# Patient Record
Sex: MALE | Employment: PART TIME | URBAN - METROPOLITAN AREA
[De-identification: names, ages, dates, MRNs, and addresses within clinical notes are randomized per-mention and may not be internally consistent; named-entity substitution may affect disease eponyms.]

---

## 2017-04-26 PROBLEM — E11.9 TYPE 2 DIABETES MELLITUS WITHOUT COMPLICATION, WITHOUT LONG-TERM CURRENT USE OF INSULIN (HCC): Status: ACTIVE | Noted: 2017-04-26

## 2017-12-08 ENCOUNTER — ANESTHESIA EVENT (OUTPATIENT)
Dept: ENDOSCOPY | Age: 52
End: 2017-12-08
Payer: COMMERCIAL

## 2017-12-08 RX ORDER — SODIUM CHLORIDE, SODIUM LACTATE, POTASSIUM CHLORIDE, CALCIUM CHLORIDE 600; 310; 30; 20 MG/100ML; MG/100ML; MG/100ML; MG/100ML
100 INJECTION, SOLUTION INTRAVENOUS CONTINUOUS
Status: CANCELLED | OUTPATIENT
Start: 2017-12-08

## 2017-12-11 ENCOUNTER — ANESTHESIA (OUTPATIENT)
Dept: ENDOSCOPY | Age: 52
End: 2017-12-11
Payer: COMMERCIAL

## 2017-12-11 ENCOUNTER — HOSPITAL ENCOUNTER (OUTPATIENT)
Age: 52
Setting detail: OUTPATIENT SURGERY
Discharge: HOME | End: 2017-12-11
Attending: SURGERY | Admitting: SURGERY
Payer: COMMERCIAL

## 2017-12-11 VITALS
OXYGEN SATURATION: 93 % | HEART RATE: 63 BPM | HEIGHT: 70 IN | DIASTOLIC BLOOD PRESSURE: 68 MMHG | RESPIRATION RATE: 16 BRPM | SYSTOLIC BLOOD PRESSURE: 141 MMHG | TEMPERATURE: 96.8 F | BODY MASS INDEX: 31.5 KG/M2 | WEIGHT: 220 LBS

## 2017-12-11 LAB — GLUCOSE BLD STRIP.AUTO-MCNC: 93 MG/DL (ref 65–100)

## 2017-12-11 PROCEDURE — 74011250636 HC RX REV CODE- 250/636

## 2017-12-11 PROCEDURE — 77030009426 HC FCPS BIOP ENDOSC BSC -B: Performed by: SURGERY

## 2017-12-11 PROCEDURE — 74011000250 HC RX REV CODE- 250

## 2017-12-11 PROCEDURE — 82962 GLUCOSE BLOOD TEST: CPT

## 2017-12-11 PROCEDURE — 77030011640 HC PAD GRND REM COVD -A: Performed by: SURGERY

## 2017-12-11 PROCEDURE — 88305 TISSUE EXAM BY PATHOLOGIST: CPT | Performed by: SURGERY

## 2017-12-11 PROCEDURE — 76060000032 HC ANESTHESIA 0.5 TO 1 HR: Performed by: SURGERY

## 2017-12-11 PROCEDURE — 76040000025: Performed by: SURGERY

## 2017-12-11 RX ORDER — LIDOCAINE HYDROCHLORIDE 20 MG/ML
INJECTION, SOLUTION EPIDURAL; INFILTRATION; INTRACAUDAL; PERINEURAL AS NEEDED
Status: DISCONTINUED | OUTPATIENT
Start: 2017-12-11 | End: 2017-12-11 | Stop reason: HOSPADM

## 2017-12-11 RX ORDER — PROPOFOL 10 MG/ML
INJECTION, EMULSION INTRAVENOUS
Status: DISCONTINUED | OUTPATIENT
Start: 2017-12-11 | End: 2017-12-11 | Stop reason: HOSPADM

## 2017-12-11 RX ORDER — PROPOFOL 10 MG/ML
INJECTION, EMULSION INTRAVENOUS AS NEEDED
Status: DISCONTINUED | OUTPATIENT
Start: 2017-12-11 | End: 2017-12-11 | Stop reason: HOSPADM

## 2017-12-11 RX ORDER — SODIUM CHLORIDE, SODIUM LACTATE, POTASSIUM CHLORIDE, CALCIUM CHLORIDE 600; 310; 30; 20 MG/100ML; MG/100ML; MG/100ML; MG/100ML
INJECTION, SOLUTION INTRAVENOUS
Status: DISCONTINUED | OUTPATIENT
Start: 2017-12-11 | End: 2017-12-11 | Stop reason: HOSPADM

## 2017-12-11 RX ADMIN — PROPOFOL 160 MCG/KG/MIN: 10 INJECTION, EMULSION INTRAVENOUS at 11:51

## 2017-12-11 RX ADMIN — SODIUM CHLORIDE, SODIUM LACTATE, POTASSIUM CHLORIDE, CALCIUM CHLORIDE: 600; 310; 30; 20 INJECTION, SOLUTION INTRAVENOUS at 11:31

## 2017-12-11 RX ADMIN — LIDOCAINE HYDROCHLORIDE 30 MG: 20 INJECTION, SOLUTION EPIDURAL; INFILTRATION; INTRACAUDAL; PERINEURAL at 11:50

## 2017-12-11 RX ADMIN — PROPOFOL 50 MG: 10 INJECTION, EMULSION INTRAVENOUS at 11:50

## 2017-12-11 RX ADMIN — PROPOFOL 30 MG: 10 INJECTION, EMULSION INTRAVENOUS at 11:52

## 2017-12-11 NOTE — DISCHARGE INSTRUCTIONS
Gastrointestinal Colonoscopy/Flexible Sigmoidoscopy - Lower Exam Discharge Instructions  1. Call Dr. Shannan Brooke at [phone] for any problems or questions. 2. Contact the doctors office for follow up appointment as directed  3. Medication may cause drowsiness for several hours, therefore, do not drive or operate machinery for remainder of the day. 4. No alcohol today. 5. Ordinarily, you may resume regular diet and activity after exam unless otherwise specified by your physician. 6. Because of air put into your colon during exam, you may experience some abdominal distension, relieved by the passage of gas, for several hours. 7. Contact your physician if you have any of the following:  a. Excessive amount of bleeding - large amount when having a bowel movement. Occasional specks of blood with bowel movement would not be unusual.  b. Severe abdominal pain  c. Fever or Chills  8. Polyp Removal - follow these additional instructions  a. Clear liquid diet for the next meal (jell-o, broth, clear drinks)  b. Soft diet for 24 hours, then resume regular diet   c. Take Metamucil - 1 tablespoon in juice every morning for 3 days  d. No Aspirin, Advil, Aleve, Nuprin, Ibuprofen, or medications that contain these drugs for 2 weeks. Any additional instructions: Await pathology results. Instructions given to Brysonila Elliott and other family members.

## 2017-12-11 NOTE — ANESTHESIA POSTPROCEDURE EVALUATION
Post-Anesthesia Evaluation and Assessment    Patient: Mary Jane Swann MRN: [id number]  SSN: [id number]    YOB: 1965  Age: 46 y.o. Sex: male       Cardiovascular Function/Vital Signs  Visit Vitals    /77    Pulse 80    Temp 36 °C (96.8 °F)    Resp 12    Ht 5' 10\" (1.778 m)    Wt 99.8 kg (220 lb)    SpO2 97%    BMI 31.57 kg/m2       Patient is status post total IV anesthesia anesthesia for Procedure(s):  COLONOSCOPY / BMI=31  ENDOSCOPIC POLYPECTOMY. Nausea/Vomiting: None    Postoperative hydration reviewed and adequate. Pain:  Pain Scale 1: Visual (12/11/17 1220)  Pain Intensity 1: 0 (12/11/17 1220)   Managed    Neurological Status: At baseline    Mental Status and Level of Consciousness: Arousable    Pulmonary Status:   O2 Device: Nasal cannula (12/11/17 1220)   Adequate oxygenation and airway patent    Complications related to anesthesia: None    Post-anesthesia assessment completed.  No concerns    Signed By: Chele Leslie MD     December 11, 2017

## 2017-12-11 NOTE — ANESTHESIA PREPROCEDURE EVALUATION
Anesthetic History   No history of anesthetic complications            Review of Systems / Medical History  Patient summary reviewed and pertinent labs reviewed    Pulmonary        Sleep apnea: CPAP           Neuro/Psych   Within defined limits           Cardiovascular    Hypertension: well controlled              Exercise tolerance: >4 METS     GI/Hepatic/Renal     GERD: well controlled           Endo/Other    Diabetes: well controlled, type 2    Arthritis     Other Findings              Physical Exam    Airway  Mallampati: II  TM Distance: 4 - 6 cm  Neck ROM: normal range of motion   Mouth opening: Normal     Cardiovascular  Regular rate and rhythm,  S1 and S2 normal,  no murmur, click, rub, or gallop             Dental         Pulmonary  Breath sounds clear to auscultation               Abdominal  GI exam deferred       Other Findings            Anesthetic Plan    ASA: 3  Anesthesia type: total IV anesthesia          Induction: Intravenous  Anesthetic plan and risks discussed with: Patient

## 2017-12-11 NOTE — H&P
Colonoscopy History and Physical      Patient: Ida Sully    Physician: Belle aMgana MD    Referring Physician: Gabriella Mendiola MD    Chief Complaint: For colonoscopy    History of Present Illness: Pt presents for colonoscopy. Initial age-based screening. History:  Past Medical History:   Diagnosis Date    Diabetes ([facility] [address]     type 2- does not check glucose- last A1C \"normal\"- in Nov 2017    Generalized osteoarthrosis, unspecified site 9/7/2013    knees    GERD (gastroesophageal reflux disease)     does take daily-does take weekly    Hepatomegaly 9/7/2013    History of athlete's foot     History of cerebral venous sinus thrombosis associated with congenital heart disease     History of kidney stones     Hodgkin lymphoma ([facility] [address]     dx 2001- no episode since    Hyperlipidemia     Hypertension     OA (osteoarthritis)     Obstructive sleep apnea (adult) (pediatric) 9/7/2013    uses CPAP    Prediabetes      Past Surgical History:   Procedure Laterality Date    HX HEENT      SINUS SURGERY    HX LITHOTRIPSY      HX LYMPH NODE DISSECTION        Social History     Social History    Marital status:      Spouse name: N/A    Number of children: N/A    Years of education: N/A     Social History Main Topics    Smoking status: Never Smoker    Smokeless tobacco: Never Used    Alcohol use No    Drug use: No    Sexual activity: Yes     Other Topics Concern    Not on file     Social History Narrative      Family History   Problem Relation Age of Onset    Heart Attack Mother 61     MI    Elevated Lipids Mother     Hypertension Mother     No Known Problems Father     Cancer Sister     MS Sister     No Known Problems Sister        Medications:   Prior to Admission medications    Medication Sig Start Date End Date Taking? Authorizing Provider   cholecalciferol, vitamin D3, (VITAMIN D3) 2,000 unit tab Take  by mouth daily.     Historical Provider   traMADol (ULTRAM) 50 mg tablet Take 1 Tab by mouth every six (6) hours as needed for Pain. Max Daily Amount: 200 mg. Indications: Pain  Patient taking differently: Take 50 mg by mouth every six (6) hours as needed for Pain. Pt takes 2 tabs at once usually  Indications: Pain 10/25/17   Nancy Mendoza MD   atorvastatin (LIPITOR) 80 mg tablet Take 1 Tab by mouth daily. Patient taking differently: Take 80 mg by mouth nightly. 10/25/17   Nancy Mendoza MD   atenolol (TENORMIN) 25 mg tablet Take 1 Tab by mouth daily for 90 days. Patient taking differently: Take 25 mg by mouth nightly. 10/25/17 1/23/18  Nancy Mendoza MD   celecoxib (CELEBREX) 200 mg capsule Take 1 Cap by mouth two (2) times a day for 90 days. Patient taking differently: Take 200 mg by mouth two (2) times a day. Last dose 12/8/17 10/25/17 1/23/18  Nancy Mendoza MD   fenofibrate micronized (LOFIBRA) 134 mg capsule Take 1 Cap by mouth every morning for 90 days. Patient taking differently: Take 134 mg by mouth nightly. 10/25/17 1/23/18  Nancy Mendoza MD   omeprazole (PRILOSEC) 40 mg capsule Take 1 Cap by mouth daily for 90 days. Patient taking differently: Take 40 mg by mouth daily as needed. 10/25/17 1/23/18  Nancy Mendoza MD   fluticasone (FLONASE) 50 mcg/actuation nasal spray 2 Sprays by Both Nostrils route daily. Patient taking differently: 2 Sprays by Both Nostrils route daily as needed. 10/25/17   Nancy Mendoza MD   dapagliflozin-metformin (XIGDUO XR) 5-1,000 mg TBph Take 1 Tab by mouth daily. Patient taking differently: Take 1 Tab by mouth nightly. 10/25/17   aNncy Mendoza MD   cpap machine kit 15 cm every night    Historical Provider   aspirin delayed-release 81 mg tablet Take 1 Tab by mouth daily. Patient taking differently: Take 81 mg by mouth daily. Last dose 12/8/17 11/22/13   Chico Smith MD       Allergies:    Allergies   Allergen Reactions    Bactrim [Sulfamethoprim Ds] Rash    Pcn [Penicillins] Rash       Physical Exam:     Vital Signs:   Visit Vitals    Temp 98 °F (36.7 °C)    Ht 5' 10\" (1.778 m)    Wt 220 lb (99.8 kg)    BMI 31.57 kg/m2     . General: in NAD      Heart: regular   Lungs: unlabored   Abdominal: soft   Neurological: grossly normal        Findings/Diagnosis: Screening for colorectal cancer     Plan of Care/Planned Procedure: Colonoscopy. Risks of endoscopy include  bleeding, perforation. They understand and agree to proceed.       Signed:  Tomy Merritt MD   12/11/2017

## 2017-12-11 NOTE — PROCEDURES
Procedure in Detail:  Informed consent was obtained for the procedure. The patient was placed in the left lateral decubitus position and sedation was induced by anesthesia. The [id number] was inserted into the rectum and advanced under direct vision to the cecum, which was identified by the ileocecal valve and appendiceal orifice. The quality of the colonic preparation was adequate. A careful inspection was made as the colonoscope was withdrawn, including a retroflexed view of the rectum; findings and interventions are described below. Appropriate photodocumentation was obtained. Findings:   Rectum:   Normal  Sigmoid:   Normal  Descending Colon:     - Protruding lesions:     -Sessile Polyp(s) size 5 mm removed by polypectomy (hot biopsy)  Transverse Colon:   Normal  Ascending Colon:   Normal  Cecum:   Normal            Specimens: Specimens were collected and sent to pathology. Complications: None; patient tolerated the procedure well. \    EBL - none    Recommendations:   - Await pathology. - If adenoma is present, repeat colonoscopy in 5 years.      Signed By: Gulshan Tompkins MD                        December 11, 2017

## 2017-12-12 ENCOUNTER — PATIENT OUTREACH (OUTPATIENT)
Dept: OTHER | Age: 52
End: 2017-12-12

## 2017-12-12 NOTE — PROGRESS NOTES
Transition Of Care Note    Patient discharged from Outpatient hospital post-colonoscopy. Medical History:     Past Medical History:   Diagnosis Date    Diabetes ([facility] [address]     type 2- does not check glucose- last A1C \"normal\"- in 2017    Generalized osteoarthrosis, unspecified site 2013    knees    GERD (gastroesophageal reflux disease)     does take daily-does take weekly    Hepatomegaly 2013    History of athlete's foot     History of cerebral venous sinus thrombosis associated with congenital heart disease     History of kidney stones     Hodgkin lymphoma ([facility] [address]     dx - no episode since    Hyperlipidemia     Hypertension     OA (osteoarthritis)     Obstructive sleep apnea (adult) (pediatric) 2013    uses CPAP    Prediabetes        Care Manager contacted the patient by telephone to perform post hospital discharge assessment. Verified  and zip code with patient as identifiers. Provided introduction to self, and explanation of the Nurse Care Manager role. Patient states he is having some new pain (5/10) in his lower back and has placed a call into his PCP to let him know. States it feels similar to previous kidney stone symptoms in the past.  Patient states he is back to eating his usual diet without nausea or diarrhea. Denies fever or rectal discharge. Denies blood in his urine. Declines any education on Type 2 diabetes, diet or heart disease at this time. Medication:   Performed medication reconciliation with patient, and patient verbalizes understanding of administration of home medications. There were no barriers to obtaining medications identified at this time. Support system:  patient    Discharge Instructions :  Reviewed discharge instructions with patient. Patient verbalizes understanding of discharge instructions and follow-up care. Red Flags:  Call your physician if  · You have pain that does not get better after you take pain medicine.    · You are sick to your stomach or cannot drink fluids. · You have new or worse belly pain. · You have blood in your stools. · You have a fever. · You cannot pass stools or gas    Advance Care Planning:   Patient was offered the opportunity to discuss advance care planning:  yes     Does patient have an Advance Directive:  yes   If no, did you provide information on Caring Connections?  no     PCP/Specialist follow up: Patient scheduled to follow up with Penny Darby MD on 12/15/17. Reviewed red flags with patient, and patient verbalizes understanding. Patient given an opportunity to ask questions. No other clinical/social/functional needs noted. The patient agrees to contact the PCP office for questions related to their healthcare. The patient expressed thanks, offered no additional questions and ended the call.

## 2017-12-20 ENCOUNTER — PATIENT OUTREACH (OUTPATIENT)
Dept: OTHER | Age: 52
End: 2017-12-20

## 2017-12-20 NOTE — PROGRESS NOTES
Care Manager contacted the patient by telephone for CM follow up. Verified  and zip code with patient as identifiers. Patient states no new problems since last call. States he is doing well. States he is no longer having the lower back pain, that he does tend to have stones now and then and feel like he may have passed a stone in the last week as his back is no longer hurting. Denies any other concerns or questions. Patient has my contact information and understands I am available anytime for any questions or concerns. Will continue to follow for any additional CM needs.

## 2018-01-14 ENCOUNTER — PATIENT OUTREACH (OUTPATIENT)
Dept: OTHER | Age: 53
End: 2018-01-14

## 2018-01-15 NOTE — PROGRESS NOTES
Resolving current episode. Transitions of care complete. Goals met and no complications post study. No further ED/UC or hospital admissions within 30 days post discharge. Patient attended follow-up appointments as directed. No outreach from patient to [address] [place] [address]. Patient declined any additional CM needs.

## 2018-08-16 ENCOUNTER — HOSPITAL ENCOUNTER (OUTPATIENT)
Dept: CT IMAGING | Age: 53
Discharge: HOME | End: 2018-08-16
Attending: FAMILY MEDICINE
Payer: COMMERCIAL

## 2018-08-16 DIAGNOSIS — R31.9 HEMATURIA, UNSPECIFIED TYPE: ICD-10-CM

## 2018-08-16 PROCEDURE — 74176 CT ABD & PELVIS W/O CONTRAST: CPT

## 2019-02-05 PROBLEM — E66.01 SEVERE OBESITY (HCC): Status: ACTIVE | Noted: 2019-02-05

## 2019-09-04 RX ORDER — SODIUM CHLORIDE 0.9 % (FLUSH) 0.9 %
5-40 SYRINGE (ML) INJECTION EVERY 8 HOURS
Status: CANCELLED | OUTPATIENT
Start: 2019-09-04

## 2019-09-04 RX ORDER — SODIUM CHLORIDE 0.9 % (FLUSH) 0.9 %
5-40 SYRINGE (ML) INJECTION AS NEEDED
Status: CANCELLED | OUTPATIENT
Start: 2019-09-04

## 2019-09-09 ENCOUNTER — ANESTHESIA EVENT (OUTPATIENT)
Dept: SURGERY | Age: 54
End: 2019-09-09
Payer: OTHER MISCELLANEOUS

## 2019-09-09 NOTE — H&P
Date of Service: 2019-08-30  Work Status:  ????? Allergies:Bactrim(rash); Penicillin(rash)  Medications:Atenolol (25 MG); Atorvastatin Calcium (80 MG);Celecoxib (200 MG); Fenofibrate Micronized (134 MG); Omeprazole (40 MG); TraMADol HCl (50 MG)    CC: Injury of the right elbow    HPI:  The patient is a 42-year-old right hand dominant white male who works for [employer] in the [email or web address] center. He injured his right elbow on the lateral aspect on 07/05/2019. He was throwing a piece of metal into a bin and it caught on another piece of metal and twisted his forearm as he was forcibly pushing within the metal.  He had immediate pain in the outer part of the elbow and has continued to bother him with grasping, gripping, lifting and other activities. It aches and hurts at night. He had a previous rupture of his right distal biceps 2 or 3 years ago that was not repaired. It has continued to bother him some with the feeling of weakness in the arm and he says now that he wishes that he had it repaired then. He has been using a wrist splint and banded type tennis elbow strap, as well as Neoprene elbow sleeve. These give him some partial relief. He has had an MRI done. PMH, SH & ROS:  This form has been filled out, reviewed, signed and placed in the patient's chart. I reviewed pertinent positive and negative responses and tried to associate them with the musculoskeletal problem of the patient. Other positive responses were deferred to the patient's primary care physician. The patient has a negative review of systems now. He does not smoke or drink. He has history of high blood pressure, high cholesterol, diabetes, sleep apnea with use of a CPAP, and he had Hodgkin's lymphoma diagnosed in 2001. MEDICATIONS:  Current medicines are listed. ALLERGIES:  Penicillin and Bactrim both of which cause a rash.        PE:  On physical exam, the patient is an alert and oriented generally healthy appearing middle aged white male. He is 5'11'' tall and weighs 215 pounds. His neck shows good general range of motion without masses or asymmetry. The right upper extremity has good movement of the shoulder. The elbow region shows the patient to have a positive Oren sign with upward migration of his biceps. He has no palpable biceps across the antecubital fossa. He has some puffiness and swelling over the lateral epicondylar region and is very tender there at the lateral epicondyle to palpation. His medial epicondyle and olecranon region are not tender. He has good strength in the triceps. He has full range of motion with range from 0 to 150 degrees. He has good pronation and supination of his elbow. He has pain with resisted wrist extension, pain being along the lateral epicondylar area. His wrist and fingers move well. Circulation is good with palpable radial pulse at the wrist with no peripheral edema present in the forearm or hand. Neurologic exam is intact to light touch and motor function in the radial, ulnar and median nerve distribution. Skin and nails are normal.      IMAGING:  The patient has had an MRI done of the right elbow at [facility] on 08/05/2019. I have reviewed both the actual images, as well as the interpretation by the radiologist.  This shows the patient to have approximately 80% high grade tear of the conjoin tendon at the lateral epicondyle. The lateral ligaments look to be intact. There was also noted to be a chronic retracted tear of the distal biceps tendon. There was also some subchondral changes noted in the capitellar region. RADIOGRAPHS:   The patient had regular x-rays of his right elbow done at [facility] on 07/05/2019. These were normal and showed no fractures or acute injuries that were visible on the x-rays.       DIAGNOSIS:   High grade partial tear of the conjoin tendon and wrist extensors of the right elbow.    DISPOSITION:  The problem was discussed with the patient. Typically with traumatic injuries such as this the pain persists and patients ultimately come to surgical repair. I recommended going ahead with that. It would be an outpatient procedure, but would require several weeks before he could return to more or less regular duty. He could probably return to light duty with minimal use of the extremity after 10-14 days. He would like to go ahead with scheduling the procedure. We will seek the authorization from Workers' Compensation carriers and final scheduling for this to be done as an outpatient. He will require a couple of bone anchors for the repair of the tendon back to the bone.

## 2019-09-10 ENCOUNTER — HOSPITAL ENCOUNTER (OUTPATIENT)
Age: 54
Setting detail: OUTPATIENT SURGERY
Discharge: HOME | End: 2019-09-10
Attending: ORTHOPAEDIC SURGERY | Admitting: ORTHOPAEDIC SURGERY
Payer: OTHER MISCELLANEOUS

## 2019-09-10 ENCOUNTER — ANESTHESIA (OUTPATIENT)
Dept: SURGERY | Age: 54
End: 2019-09-10
Payer: OTHER MISCELLANEOUS

## 2019-09-10 VITALS
OXYGEN SATURATION: 96 % | HEART RATE: 51 BPM | RESPIRATION RATE: 16 BRPM | TEMPERATURE: 97.5 F | WEIGHT: 215 LBS | SYSTOLIC BLOOD PRESSURE: 122 MMHG | DIASTOLIC BLOOD PRESSURE: 72 MMHG | BODY MASS INDEX: 29.99 KG/M2

## 2019-09-10 DIAGNOSIS — G89.18 POST-OP PAIN: Primary | ICD-10-CM

## 2019-09-10 LAB — GLUCOSE BLD STRIP.AUTO-MCNC: 97 MG/DL (ref 65–100)

## 2019-09-10 PROCEDURE — 77030016570 HC BLNKT BAIR HGGR 3M -B: Performed by: NURSE ANESTHETIST, CERTIFIED REGISTERED

## 2019-09-10 PROCEDURE — 76210000021 HC REC RM PH II 0.5 TO 1 HR: Performed by: ORTHOPAEDIC SURGERY

## 2019-09-10 PROCEDURE — 77030020275 HC MISC ORTHOPEDIC: Performed by: ORTHOPAEDIC SURGERY

## 2019-09-10 PROCEDURE — 76010010054 HC POST OP PAIN BLOCK: Performed by: ORTHOPAEDIC SURGERY

## 2019-09-10 PROCEDURE — 77030039266 HC ADH SKN EXOFIN S2SG -A: Performed by: ORTHOPAEDIC SURGERY

## 2019-09-10 PROCEDURE — C1713 ANCHOR/SCREW BN/BN,TIS/BN: HCPCS | Performed by: ORTHOPAEDIC SURGERY

## 2019-09-10 PROCEDURE — 74011250636 HC RX REV CODE- 250/636

## 2019-09-10 PROCEDURE — 76942 ECHO GUIDE FOR BIOPSY: CPT | Performed by: ORTHOPAEDIC SURGERY

## 2019-09-10 PROCEDURE — 76210000063 HC OR PH I REC FIRST 0.5 HR: Performed by: ORTHOPAEDIC SURGERY

## 2019-09-10 PROCEDURE — 76060000032 HC ANESTHESIA 0.5 TO 1 HR: Performed by: ORTHOPAEDIC SURGERY

## 2019-09-10 PROCEDURE — 82962 GLUCOSE BLOOD TEST: CPT

## 2019-09-10 PROCEDURE — 76010000160 HC OR TIME 0.5 TO 1 HR INTENSV-TIER 1: Performed by: ORTHOPAEDIC SURGERY

## 2019-09-10 PROCEDURE — 77030002966 HC SUT PDS J&J -A: Performed by: ORTHOPAEDIC SURGERY

## 2019-09-10 PROCEDURE — A4565 SLINGS: HCPCS | Performed by: ORTHOPAEDIC SURGERY

## 2019-09-10 PROCEDURE — 77030003028 HC SUT VCRL J&J -A: Performed by: ORTHOPAEDIC SURGERY

## 2019-09-10 PROCEDURE — 77030003602 HC NDL NRV BLK BBMI -B: Performed by: NURSE ANESTHETIST, CERTIFIED REGISTERED

## 2019-09-10 PROCEDURE — 77030018836 HC SOL IRR NACL ICUM -A: Performed by: ORTHOPAEDIC SURGERY

## 2019-09-10 PROCEDURE — 74011250636 HC RX REV CODE- 250/636: Performed by: ORTHOPAEDIC SURGERY

## 2019-09-10 PROCEDURE — 77030000032 HC CUF TRNQT ZIMM -B: Performed by: ORTHOPAEDIC SURGERY

## 2019-09-10 PROCEDURE — 74011250636 HC RX REV CODE- 250/636: Performed by: ANESTHESIOLOGY

## 2019-09-10 PROCEDURE — 77030029883 HC RETRV SUT ARTHSCP HOFFE BEAT -B: Performed by: ORTHOPAEDIC SURGERY

## 2019-09-10 DEVICE — DYNOMITE 2.0 MM PEEK WITH 1                                    ULTRABRAID SUTURE SIZES 2-0 AND NEEDLES
Type: IMPLANTABLE DEVICE | Site: ARM | Status: FUNCTIONAL
Brand: DYNOMITE

## 2019-09-10 RX ORDER — SODIUM CHLORIDE 0.9 % (FLUSH) 0.9 %
5-40 SYRINGE (ML) INJECTION EVERY 8 HOURS
Status: DISCONTINUED | OUTPATIENT
Start: 2019-09-10 | End: 2019-09-10 | Stop reason: HOSPADM

## 2019-09-10 RX ORDER — OXYCODONE HYDROCHLORIDE 5 MG/1
5 TABLET ORAL
Status: DISCONTINUED | OUTPATIENT
Start: 2019-09-10 | End: 2019-09-10 | Stop reason: HOSPADM

## 2019-09-10 RX ORDER — SODIUM CHLORIDE, SODIUM LACTATE, POTASSIUM CHLORIDE, CALCIUM CHLORIDE 600; 310; 30; 20 MG/100ML; MG/100ML; MG/100ML; MG/100ML
100 INJECTION, SOLUTION INTRAVENOUS CONTINUOUS
Status: DISCONTINUED | OUTPATIENT
Start: 2019-09-10 | End: 2019-09-10 | Stop reason: HOSPADM

## 2019-09-10 RX ORDER — SODIUM CHLORIDE 0.9 % (FLUSH) 0.9 %
5-40 SYRINGE (ML) INJECTION AS NEEDED
Status: DISCONTINUED | OUTPATIENT
Start: 2019-09-10 | End: 2019-09-10 | Stop reason: HOSPADM

## 2019-09-10 RX ORDER — CEFAZOLIN SODIUM/WATER 2 G/20 ML
2 SYRINGE (ML) INTRAVENOUS ONCE
Status: COMPLETED | OUTPATIENT
Start: 2019-09-10 | End: 2019-09-10

## 2019-09-10 RX ORDER — ONDANSETRON 2 MG/ML
INJECTION INTRAMUSCULAR; INTRAVENOUS AS NEEDED
Status: DISCONTINUED | OUTPATIENT
Start: 2019-09-10 | End: 2019-09-10 | Stop reason: HOSPADM

## 2019-09-10 RX ORDER — LIDOCAINE HYDROCHLORIDE 10 MG/ML
0.1 INJECTION INFILTRATION; PERINEURAL AS NEEDED
Status: DISCONTINUED | OUTPATIENT
Start: 2019-09-10 | End: 2019-09-10 | Stop reason: HOSPADM

## 2019-09-10 RX ORDER — FENTANYL CITRATE 50 UG/ML
100 INJECTION, SOLUTION INTRAMUSCULAR; INTRAVENOUS ONCE
Status: COMPLETED | OUTPATIENT
Start: 2019-09-10 | End: 2019-09-10

## 2019-09-10 RX ORDER — PROPOFOL 10 MG/ML
INJECTION, EMULSION INTRAVENOUS
Status: DISCONTINUED | OUTPATIENT
Start: 2019-09-10 | End: 2019-09-10 | Stop reason: HOSPADM

## 2019-09-10 RX ORDER — NALOXONE HYDROCHLORIDE 0.4 MG/ML
0.04 INJECTION, SOLUTION INTRAMUSCULAR; INTRAVENOUS; SUBCUTANEOUS
Status: DISCONTINUED | OUTPATIENT
Start: 2019-09-10 | End: 2019-09-10 | Stop reason: HOSPADM

## 2019-09-10 RX ORDER — HYDROMORPHONE HYDROCHLORIDE 2 MG/ML
0.5 INJECTION, SOLUTION INTRAMUSCULAR; INTRAVENOUS; SUBCUTANEOUS
Status: DISCONTINUED | OUTPATIENT
Start: 2019-09-10 | End: 2019-09-10 | Stop reason: HOSPADM

## 2019-09-10 RX ORDER — PROPOFOL 10 MG/ML
INJECTION, EMULSION INTRAVENOUS AS NEEDED
Status: DISCONTINUED | OUTPATIENT
Start: 2019-09-10 | End: 2019-09-10 | Stop reason: HOSPADM

## 2019-09-10 RX ORDER — MIDAZOLAM HYDROCHLORIDE 1 MG/ML
2 INJECTION, SOLUTION INTRAMUSCULAR; INTRAVENOUS
Status: COMPLETED | OUTPATIENT
Start: 2019-09-10 | End: 2019-09-10

## 2019-09-10 RX ORDER — MIDAZOLAM HYDROCHLORIDE 1 MG/ML
2 INJECTION, SOLUTION INTRAMUSCULAR; INTRAVENOUS ONCE
Status: DISCONTINUED | OUTPATIENT
Start: 2019-09-10 | End: 2019-09-10 | Stop reason: HOSPADM

## 2019-09-10 RX ORDER — OXYCODONE HYDROCHLORIDE 5 MG/1
5 TABLET ORAL
Qty: 30 TAB | Refills: 0 | Status: SHIPPED | OUTPATIENT
Start: 2019-09-10 | End: 2019-09-15

## 2019-09-10 RX ADMIN — Medication 2 G: at 08:07

## 2019-09-10 RX ADMIN — MIDAZOLAM HYDROCHLORIDE 2 MG: 1 INJECTION, SOLUTION INTRAMUSCULAR; INTRAVENOUS at 06:50

## 2019-09-10 RX ADMIN — FENTANYL CITRATE 100 MCG: 50 INJECTION INTRAMUSCULAR; INTRAVENOUS at 06:50

## 2019-09-10 RX ADMIN — PROPOFOL 200 MCG/KG/MIN: 10 INJECTION, EMULSION INTRAVENOUS at 08:07

## 2019-09-10 RX ADMIN — PROPOFOL 30 MG: 10 INJECTION, EMULSION INTRAVENOUS at 08:16

## 2019-09-10 RX ADMIN — SODIUM CHLORIDE, SODIUM LACTATE, POTASSIUM CHLORIDE, AND CALCIUM CHLORIDE 100 ML/HR: 600; 310; 30; 20 INJECTION, SOLUTION INTRAVENOUS at 06:53

## 2019-09-10 RX ADMIN — ONDANSETRON 4 MG: 2 INJECTION INTRAMUSCULAR; INTRAVENOUS at 08:07

## 2019-09-10 RX ADMIN — PROPOFOL 20 MG: 10 INJECTION, EMULSION INTRAVENOUS at 08:09

## 2019-09-10 NOTE — PERIOP NOTES
(This note made at [phone] on 9-10-19)    This RN answered phone. Pt's wife calling. Concerned that pt has no feeling on operative arm and states operative arm feels \"warm\". Reassured her these are normal findings after receiving a peripheral nerve block. Pt came on speaker phone and states he can't feel anything from his shoulder down. Reassured, again, that this is the goal of a nerve block. Questioned if pt's fingers are his normal skin color. Pt's wife they Junnie No a little darker\". Recommended she loosen pt's ace wrap bandage a little. Instructed if the color continues to darken and swelling does not improve, to call Dr. Aubree Le office. Pt and his wife voice understanding.

## 2019-09-10 NOTE — OP NOTES
Blanca Pino        9/10/2019    Indications: This is a 47 yrs male with a high grade partial tear of his right conjoined tendon. The patient is admitted for surgery as conservative measures have failed. Pre-operative Diagnosis: TEAR OF THE CONJOINED EXTENSOR TENDON OF THE RIGHT ELBOW    Post-operative Diagnosis:  TEAR OF THE CONJOINED EXTENSOR TENDON OF THE RIGHT ELBOW    Procedure: Release with Debridement of Tendon and Bone and Repair of the Conjoined Extensor  Tendon of the Right Elbow    Surgeon: Christy Kearney MD    Anesthesia: Brachial Plexus Block    Procedure Details: Following identification of the patient, the patient was taken to the operating suite and positioned on the operating table in the supine fashion. A time out was performed by the surgical team identifying the patient, surgeon, procedure and site. The patient had been given prophylactic IV Ancef preoperatively. The  right arm  was then prepped and draped in the usual sterile fashion. A 5-6 cm longitudinal was made over the lateral epicondyle and carried down through the subcutaneous layer. Small bleeders were electro-coagulated. The conjoined tendon was split longitudinally and the superficial layers retracted exposing the ECRB origin. There was an area of degenerative tendon and high grade tear or avulsion from the bone. The tendon was split longitudinally and the degenerative area was excised sharply down to the bone. The cortex of the lateral epicondyle was prominent and had a sharp irregular cortex. The prominent portion of the bone was removed with a rongeur and smoothed. The tendon was then repaired to the bone with 2 Smith Nephew DynoMite PEEK anchors inserted into drill holes in the lateral epicondyle. Each anchor was preloaded with a 2-0 UltraBraid suture.   These sutures were passed through the margin of the tendon from the underneath side and then back down through the tendon so that the knots were buried beneath the tendon. The more distal split was repaired  with inverted 2-0 PDS stitches. The subcutaneous layer was closed with 4-0 StratoFix suture and then ExoFin surgical adhesive was  applied to the incision. A sterile dressing of gauze and an Ace wrap was used for compression. The tourniquet was released. The patient was then transferred to the Recovery Room in stable condition.       Findings:   Degenerative area and tear of the ECRB tendon and a very pominent sharp ridge of underlying cortical bone    Tourniquet Time:   Total Tourniquet Time Documented:  Upper Arm (Right) - 26 minutes  Total: Upper Arm (Right) - 26 minutes      Signed By: Juanita Yeboah MD

## 2019-09-10 NOTE — INTERVAL H&P NOTE
H&P Update:  Flako Vela was seen and examined. Pt is alert and oriented. Chest: Clear w/o SOB. C/V:  RR&R    History and physical has been reviewed. The patient has been examined. There have been no significant clinical changes since the completion of the originally dated History and Physical.  Patient identified by surgeon; surgical site was confirmed by patient and surgeon. The patient is for repair of the right conjoined tendon of the elbow.

## 2019-09-10 NOTE — ANESTHESIA POSTPROCEDURE EVALUATION
Procedure(s):  RIGHT ELBOW REPAIR OF EXTENSOR TENDON BRACHIOPLEXUS BLOCK.    total IV anesthesia    Anesthesia Post Evaluation        Patient location during evaluation: PACU  Patient participation: complete - patient participated  Level of consciousness: awake  Pain management: satisfactory to patient  Airway patency: patent  Anesthetic complications: no  Cardiovascular status: hemodynamically stable  Respiratory status: spontaneous ventilation  Hydration status: euvolemic  Post anesthesia nausea and vomiting:  none      Vitals Value Taken Time   /70 9/10/2019  9:09 AM   Temp 36.4 °C (97.5 °F) 9/10/2019  8:51 AM   Pulse 53 9/10/2019  9:14 AM   Resp 16 9/10/2019  9:04 AM   SpO2 95 % 9/10/2019  9:14 AM   Vitals shown include unvalidated device data.

## 2019-09-10 NOTE — ANESTHESIA PROCEDURE NOTES
Peripheral Block    Start time: 9/10/2019 6:50 AM  End time: 9/10/2019 6:56 AM  Performed by: Srini Power MD  Authorized by: Srini Power MD       Pre-procedure: Indications: at surgeon's request, post-op pain management and procedure for pain    Preanesthetic Checklist: patient identified, risks and benefits discussed, site marked, timeout performed, anesthesia consent given and patient being monitored    Timeout Time: 06:50          Block Type:   Block Type:  Supraclavicular  Monitoring:  Standard ASA monitoring, continuous pulse ox, frequent vital sign checks, heart rate, oxygen and responsive to questions  Injection Technique:  Single shot  Procedures: ultrasound guided and nerve stimulator    Prep: chlorhexidine    Needle Type:  Stimuplex (4 Inch)  Needle Gauge:  20 G  Needle Localization:  Ultrasound guidance and nerve stimulator  Motor Response: minimal motor response >0.4 mA      Assessment:  Number of attempts:  1  Injection Assessment:  Incremental injection every 5 mL, local visualized surrounding nerve on ultrasound, negative aspiration for blood, no paresthesia, no intravascular symptoms and ultrasound image on chart  Patient tolerance:  Patient tolerated the procedure well with no immediate complications  3 cc 1% lidocaine injected at site of needle insertion. Needle inserted and placed in close proximity to the nerve under real time ultrasound guidance. Ultrasound was used to visualize the spread of local anesthetic in close proximity to the nerve being blocked. The nerve appeared anatomically normal and there were no abnormal findings.

## 2019-09-10 NOTE — DISCHARGE INSTRUCTIONS
POST OP INSTRUCTIONS      1. Patient has appointment for 9/19/19 at 2:00 PM at the [place] [address] office. 2.  For problems call Dr Raymundo Redman, [facility],  [phone]          Appointments only,  [phone]    3. Ice and elevate the surgical site. 4.  May remove dressings and wash in running water or shower. Do not submerge in bath or dish water. ACTIVITY  · As tolerated and as directed by your doctor. · Bathe or shower as directed by your doctor. DIET  · Clear liquids until no nausea or vomiting; then light diet for the first day. · Advance to regular diet on second day, unless your doctor orders otherwise. · If nausea and vomiting continues, call your doctor. PAIN  · Take pain medication as directed by your doctor. · Call your doctor if pain is NOT relieved by medication. · DO NOT take aspirin of blood thinners unless directed by your doctor. CALL YOUR DOCTOR IF   · Excessive bleeding that does not stop after holding pressure over the area  · Temperature of 101 degrees F or above  · Excessive redness, swelling or bruising, and/ or green or yellow, smelly discharge from incision    AFTER ANESTHESIA   · For the first 24 hours: DO NOT Drive, Drink alcoholic beverages, or Make important decisions. · Be aware of dizziness following anesthesia and while taking pain medication. DISCHARGE SUMMARY from Nurse    PATIENT INSTRUCTIONS:    After general anesthesia or intravenous sedation, for 24 hours or while taking prescription Narcotics:  · Limit your activities  · Do not drive and operate hazardous machinery  · Do not make important personal or business decisions  · Do  not drink alcoholic beverages  · If you have not urinated within 8 hours after discharge, please contact your surgeon on call. *  Please give a list of your current medications to your Primary Care Provider.     *  Please update this list whenever your medications are discontinued, doses are      changed, or new medications (including over-the-counter products) are added. *  Please carry medication information at all times in case of emergency situations. These are general instructions for a healthy lifestyle:    No smoking/ No tobacco products/ Avoid exposure to second hand smoke    Surgeon General's Warning:  Quitting smoking now greatly reduces serious risk to your health. Obesity, smoking, and sedentary lifestyle greatly increases your risk for illness    A healthy diet, regular physical exercise & weight monitoring are important for maintaining a healthy lifestyle    You may be retaining fluid if you have a history of heart failure or if you experience any of the following symptoms:  Weight gain of 3 pounds or more overnight or 5 pounds in a week, increased swelling in our hands or feet or shortness of breath while lying flat in bed. Please call your doctor as soon as you notice any of these symptoms; do not wait until your next office visit. Recognize signs and symptoms of STROKE:    F-face looks uneven    A-arms unable to move or move unevenly    S-speech slurred or non-existent    T-time-call 911 as soon as signs and symptoms begin-DO NOT go       Back to bed or wait to see if you get better-TIME IS BRAIN. Using a Sling: Care Instructions  Your Care Instructions  A sling supports your forearm. It keeps an injured arm or shoulder from moving. Some slings, called immobilizers, have a strap that goes around your waist to hold your arm against your body. Your doctor may have given you a custom sling that holds your arm in a certain position. If not, you can use a ready-made sling from a drugstore. But a sling can create problems. Keeping your arm in one position for too long can cause serious problems, such as frozen shoulder. Follow-up care is a key part of your treatment and safety.  Be sure to make and go to all appointments, and call your doctor if you are having problems. It's also a good idea to know your test results and keep a list of the medicines you take. How can you care for yourself at home? · To put a sling on by yourself without using your shoulder, place the sling on a table. Lower your forearm into the sling pocket. Then secure the strap(s). · If you are able, you can first strap the sling over your shoulder, then slip your forearm into the sling pocket. · Make sure that the sling allows your arm and shoulder to relax. · Follow your doctor's instructions for how often to:  ? Take the sling off-When nerve block wears off and you can feel your arm and control movements  ? Do exercises to prevent problems such as frozen shoulder. · If the fingers of the arm in the sling were not injured, wiggle them every now and then. This helps move the blood and fluids in the injured arm. · Keep up your muscle strength and tone as much as you can while protecting your injured arm or shoulder. Your doctor may want you to tense and relax the muscles protected by the sling, but only if it's not painful. Check with your doctor or your physical or occupational therapist for instructions. · Use the sling until your doctor tells you that you no longer need it. How long you wear a sling depends on your diagnosis and how you heal.  When should you call for help? Call your doctor now or seek immediate medical care if:    · Your pain gets a lot worse.     · You cannot move your arm.     · You have tingling, weakness, or numbness in your hand or arm.     · Your arm or hand turns cold or changes color.     · Your sling feels too tight, and you cannot loosen it.    Watch closely for changes in your health, and be sure to contact your doctor if:    · You have new or worse swelling in your arm.     · You have new pain that develops in another area of your arm.     · You do not get better as expected. Where can you learn more?   Go to http://joselito-suzi.info/. Enter W972 in the search box to learn more about \"Using a Sling: Care Instructions. \"  Current as of: September 20, 2018  Content Version: 12.1  © 3643-0703 [employer]. Care instructions adapted under license by [employer] (which disclaims liability or warranty for this information). If you have questions about a medical condition or this instruction, always ask your healthcare professional. [address] any warranty or liability for your use of this information. Oxycodone, Rapid Release (By mouth)   Oxycodone Hydrochloride (vn-x-PEA-done estefania-droe-KLOR-yessenia)  Treats moderate to severe pain. This medicine is a narcotic pain reliever. Brand Name(s): Oxaydo, Oxy IR, Roxicodone   There may be other brand names for this medicine. When This Medicine Should Not Be Used: This medicine is not right for everyone. Do not use it if you had an allergic reaction to oxycodone, codeine, hydrocodone, dihydrocodeine, or morphine, or you have a stomach or bowel blockage. How to Use This Medicine:   Capsule, Liquid, Tablet  · Take your medicine as directed. Your dose may need to be changed several times to find what works best for you. · An overdose can be dangerous. Follow directions carefully so you do not get too much medicine at one time. · Oral liquid: Measure the oral liquid medicine with a marked measuring spoon, oral syringe, or medicine cup. · Oxaydo® tablet: Swallow it whole with enough water to swallow it completely. Do not break, crush, chew, or dissolve it. Do not wet the tablet before you put it in your mouth. · This medicine should come with a Medication Guide. Ask your pharmacist for a copy if you do not have one. · Missed dose: Take a dose as soon as you remember. If it is almost time for your next dose, wait until then and take a regular dose. Do not take extra medicine to make up for a missed dose.   · Store the medicine in a closed container at room temperature, away from heat, moisture, and direct light. Store the medicine in a secure place to prevent others from getting it. Ask your pharmacist about the best way to dispose of medicine you do not use. Drugs and Foods to Avoid:   Ask your doctor or pharmacist before using any other medicine, including over-the-counter medicines, vitamins, and herbal products. · Do not use this medicine if you are using or have used an MAO inhibitor within the past 14 days. · Some medicines can affect how oxycodone works. Tell your doctor if you are using any of the following:  ¨ Amiodarone, carbamazepine, erythromycin, ketoconazole, phenytoin, quinidine, rifampin, ritonavir  ¨ Diuretic (water pill)  ¨ Medicine to treat depression or anxiety  ¨ Medicine to treat migraine headaches  ¨ Phenothiazine medicine  · Tell your doctor if you use anything else that makes you sleepy. Some examples are allergy medicine, narcotic pain medicine, and alcohol. Tell your doctor if you are using buprenorphine, butorphanol, nalbuphine, pentazocine, or a muscle relaxer. · Do not drink alcohol while you are using this medicine. Warnings While Using This Medicine:   · Tell your doctor if you are pregnant or breastfeeding, or if you have kidney disease, liver disease, heart disease, low blood pressure, lung disease or breathing problems (such as asthma, COPD), scoliosis, an enlarged prostate or trouble urinating, an underactive thyroid, Clarence disease, gallbladder or pancreas problems, or digestion problems. Tell your doctor if you have a history of head injury, brain tumor, mental health problems, seizures, or alcohol or drug addiction.   · This medicine may cause the following problems:  ¨ High risk of overdose, which can lead to death  ¨ Respiratory depression (serious breathing problem that can be life-threatening)  ¨ Serotonin syndrome, when used with certain medicines  · This medicine may make you dizzy, drowsy, or faint. Do not drive or do anything else that could be dangerous until you know how this medicine affects you. Sit or lie down if you feel dizzy. Stand up carefully. · This medicine can be habit-forming. Do not use more than your prescribed dose. Call your doctor if you think your medicine is not working. · Do not stop using this medicine suddenly. Your doctor will need to slowly decrease your dose before you stop it completely. · This medicine may cause constipation, especially with long-term use. Ask your doctor if you should use a laxative to prevent and treat constipation. Drink plenty of liquids to help avoid constipation. · This medicine could cause infertility. Talk with your doctor before using this medicine if you plan to have children. · Keep all medicine out of the reach of children. Never share your medicine with anyone. Possible Side Effects While Using This Medicine:   Call your doctor right away if you notice any of these side effects:  · Allergic reaction: Itching or hives, swelling in your face or hands, swelling or tingling in your mouth or throat, chest tightness, trouble breathing  · Anxiety, restlessness, fast heartbeat, fever, sweating, muscle spasms, twitching, nausea, vomiting, diarrhea, seeing or hearing things that are not there  · Blue lips, fingernails, or skin, trouble breathing  · Extreme dizziness or weakness, shallow breathing, slow heartbeat, sweating, cold or clammy skin, seizures  · Lightheadedness, dizziness, fainting  · Severe constipation, stomach pain  If you notice these less serious side effects, talk with your doctor:   · Mild constipation  · Sleepiness, tiredness  If you notice other side effects that you think are caused by this medicine, tell your doctor. Call your doctor for medical advice about side effects.  You may report side effects to FDA at 3-885-FDA-6825  © 2017 [facility] Information is for End User's use only and may not be sold, redistributed or otherwise used for commercial purposes. The above information is an  only. It is not intended as medical advice for individual conditions or treatments. Talk to your doctor, nurse or pharmacist before following any medical regimen to see if it is safe and effective for you.

## 2019-09-10 NOTE — ANESTHESIA PREPROCEDURE EVALUATION
Relevant Problems   No relevant active problems       Anesthetic History   No history of anesthetic complications            Review of Systems / Medical History  Pertinent labs reviewed    Pulmonary        Sleep apnea: CPAP           Neuro/Psych   Within defined limits           Cardiovascular    Hypertension              Exercise tolerance: >4 METS     GI/Hepatic/Renal     GERD: well controlled           Endo/Other    Diabetes: type 2    Obesity, arthritis and cancer (lymphoma)     Other Findings            Physical Exam    Airway  Mallampati: II  TM Distance: 4 - 6 cm  Neck ROM: normal range of motion, short neck   Mouth opening: Normal     Cardiovascular  Regular rate and rhythm,  S1 and S2 normal,  no murmur, click, rub, or gallop            Comments: distant Dental  No notable dental hx       Pulmonary  Breath sounds clear to auscultation              Comments: distant Abdominal  GI exam deferred       Other Findings            Anesthetic Plan    ASA: 2  Anesthesia type: total IV anesthesia          Induction: Intravenous  Anesthetic plan and risks discussed with: Patient

## 2019-09-13 ENCOUNTER — PATIENT OUTREACH (OUTPATIENT)
Dept: OTHER | Age: 54
End: 2019-09-13

## 2019-09-13 NOTE — PROGRESS NOTES
Patient eligible for [facility] Employee care management. Received notification of outpatient surgery from ADT notification in [address] [place] [address];        Care Manager contacted the patient, verified  and zip code as identifiers. Provided introduction and explanation of the Nurse Care Manager role. Agreed to CM follow-up and assistance at this time. Care management discharge assessment completed. PMH:   Past Medical History:   Diagnosis Date    Diabetes ([facility] [address]     type 2- oral agents- does not check glucose- last A1C 5.7- 2019- denies hypoglycemia    Generalized osteoarthrosis, unspecified site 2013    knees    GERD (gastroesophageal reflux disease)     resolves with omeprazole prn--    Hepatomegaly 2013    History of athlete's foot     History of cerebral venous sinus thrombosis associated with congenital heart disease     Pt denies any knowledge of this dx 19- Pt denies ever having this dx or symptoms associated with this dx    History of kidney stones     Hodgkin lymphoma ([facility] [address]     dx - no recurrence- chemo and stem cell     Hyperlipidemia     Hypertension     OA (osteoarthritis)     Obstructive sleep apnea (adult) (pediatric) 2013    uses CPAP    Prediabetes      46 yo male who is 3 day(s) s/p Release and Debridement of Tendon and Bone, 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 Surgical/Wound Condition Focused Assessment    Skin- wounds or incisions? Yes Right Elbow   Description and location of wound-    Reports wife says R elbow incision site is dry, minimal redness;  Denies incision site drainage and no open areas at incision; In the last 24 hour have you experienced;     Fever no    Low body temperature no    Chills or shaking no    Sweating no    Fast heart rate no    Fast breathing no    Dizziness/lightheadedness no    Confusion or unusual change in mental status no    Diarrhea no    Nausea no    Vomiting no    Shortness of breath or difficulty breathing no    Less urine output no    Cold, clammy, and pale skin no     Skin rash or skin color changes no  New or worsening pain? no  If yes, pain rated 0-10:  3/10 incision site pain;       Denies needing roxycodone for pain now;  New or worsening numbness or tingling? no  If yes, location of numbness and tingling: NA    Activity level- moving several times a day, or as recommended? Reports moving his right arm, R wrist and hand - but not lifting anything;   Verbalizes he is not to return to work until see the surgeon at his first postop appt;   Reports sharp pain R arm/elbow with wrist and hand moving;     Reports he is not wearing his sling at all now; Activity restrictions postop:    Patient reports has not kept his arm in the sling provided since he got home, as he was instructed to keep moving his arm to prevent stiffness; Noted DC instructions do not address any actvity restrictions or how/when to use sling postop during recovery; Bathing and showering instructions? Yes   May shower, remove dressing and wash wound;    Nutrition- prescribed diet? No    Tolerating food? yes    Does patient have incentive spirometer? no  If yes, how frequently is patient using incentive spirometer? NA     Postop Red Flags  Call you doctor now or seek immediate medical attention if:  · You have postop pain that does not get better after taking your pain medications  · Your R arm or hand is cool, pale or changes color. · You have tingling, weakness, or numbness in your R hand or fingers. · You have loose stitches, or your incision comes open. · You develop signs of an infection at incision site:  · Increased pain, swelling or redness at wound site;  · Pus draining from the wound site or on dressing;  · A fever;  · Notice a foul odor from the wound;     Initial Plan of Care as discussed and agreed with patient:    Impaired Skin Integrity, Acute Pain  Goal:  Demonstrates no signs of infection, red flags in 30 days;   Reviewed red flags of infection with patient;   Discussed importance of monitoring and reporting red flags postop;   Teach back demonstrates patient understands when to notify surgeon;   Reviewed medications with patient to ensure understanding; - Verbalized understanding of when and how to take Roxycodone prn pain;  - Reports some more pain in R arm/elbow today;  - States attempted to use arm for activities, like brushing teeth but hurts too much; Impaired Mobility   Goal:  Demonstrates following safety behaviors to prevent injury and improve ROM postop;   DC instructions do not address any activity restrictions postop;    DC instructions show sling provided at DC, but does not provide when/how to use postop;   Reports using his R arm, but no lifting, not wearing sling;   States he was told at DC to use R arm and don't let it get stiff;   Encourage patient to increase activity a little every day, not overuse;   Outbound call placed x 3 to speak with POA office regarding postop directions:   Transferred to  message stating would be 24-48 hrs before call returned;   Called back to [facility], transferred to [address] [place] [address] with Dr. Addis Jones - covering for Dr. Elihue Lanes today;   [facility] Communication message stated if postop patient to let the  know;  - Call back 3rd time;   Requested but POA  would not allow paging or speaking to a nurse;   Left a  message and provide explanation reason for call, contact info, including this was about a postop patient of Dr. Elihue Lanes; Review and discussion of initial plan of care with patient, who has provided input to plan, verbalized understanding and agrees with current goals.       Potential Barriers to Self-Management or Access:   []  Decline in memory    []  Language barrier  []  Emotional   [x]  Limited mobility  []  Lack of motivation     [] Vision, hearing or cognitive impairment  [x]  Knowledge    [] Financial Barriers    [x]  Pain    []  Other     Discharge Instructions:  Reviewed discharge instructions with patient. Patient verbalizes understanding of discharge instructions and importance of follow-up care. Medications:   New Medications at Discharge:  Roxycodone; Changed Medications at Discharge:  NA  Discontinued Medications at Discharge:  Hold Ultram    Current Outpatient Medications   Medication Sig    dapagliflozin-metformin (XIGDUO XR) 5-1,000 mg TBph Take 1 Tab by mouth daily.  omeprazole (PRILOSEC) 40 mg capsule Take 1 Cap by mouth daily for 90 days. (Patient taking differently: Take 40 mg by mouth daily as needed.)    celecoxib (CELEBREX) 200 mg capsule Take 1 Cap by mouth two (2) times a day for 90 days. (Patient taking differently: Take 200 mg by mouth two (2) times a day. Indications: hold for surgery- last dose 9/8/19)    atenolol (TENORMIN) 25 mg tablet Take 1 Tab by mouth daily for 90 days. (Patient taking differently: Take 25 mg by mouth every evening.)    atorvastatin (LIPITOR) 80 mg tablet Take 1 Tab by mouth daily. (Patient taking differently: Take 80 mg by mouth every evening.)    fluticasone (FLONASE) 50 mcg/actuation nasal spray 2 Sprays by Both Nostrils route daily. (Patient taking differently: 2 Sprays by Both Nostrils route daily as needed.)    aspirin delayed-release 81 mg tablet Take 1 Tab by mouth daily. (Patient taking differently: Take 81 mg by mouth daily. Indications: hold for surgery- last dose 9/8/19)    cholecalciferol, vitamin D3, (VITAMIN D3) 2,000 unit tab Take 1 Tab by mouth daily.  cpap machine kit 15 cm every night    oxyCODONE IR (ROXICODONE) 5 mg immediate release tablet Take 1 Tab by mouth every four (4) hours as needed for Pain for up to 5 days. Max Daily Amount: 30 mg. No current facility-administered medications for this visit. Completed a review of medications with patient, who verbalized understanding of how and when to take medications.       Barriers to Medication Adherence:  None;    LYNETTE Postop Surgical  Appt at [facility] with Dr. Amarjit Kramer on 09/19/19; Future Appointments   Date Time Provider Allyssa Lopez   12/3/2019  2:10 PM PST LAB [facility]   12/18/2019  5:30 PM Coy Colvin MD [facility] PST PST       Patient verbalized understanding of all information discussed. Pt has my contact information for any further questions, concerns, or needs.     Plan for next call:  Await call back from Dr. Donna Avery office regardin activity restrictions or sling use, if any;

## 2019-09-13 NOTE — PROGRESS NOTES
Incoming call from Sebas Trammell with [address]; She reviewed appropriate DC instructions for this patient postop; Should be wearing sling when leaving/outisde the house, when sleeping at night;  Patient may use arm to perform light activities like brushing teeth and hair. Patient is restricted to perform no lifting at all. Patient is allowed to perform movements to keep arm active and prevent stiffness during the day;  Goal is to be ready for physical therapy start; Thanked for her return call; Outreach call placed to patient to review these instructions;  Verbalized understanding with teach back successful.

## 2019-09-20 ENCOUNTER — PATIENT OUTREACH (OUTPATIENT)
Dept: OTHER | Age: 54
End: 2019-09-20

## 2019-09-20 NOTE — PROGRESS NOTES
Care Manager contacted the patient by telephone in follow up. Verified  and zip code with patient as identifiers. 46 yo male who is 1 week s/p Release and Debridement of Tendon and Bone, Repair of the Conjoined Extensor Tendon of the Right Elbow. Assessment of clinical changes and knowledge demonstrated since last call:   Ongoing Plan of Care:     Impaired Skin Integrity, Acute Pain  Goal:    Demonstrates no signs of infection, red flags in 30 days;  · Denies worsening of pain     Impaired Mobility   Goal:  Demonstrates following safety behaviors to prevent injury and improve ROM postop;  · Reports on/off use of arm sling;  ? Wears it at night sleeping;  · Reports no lifting, pushing;  · Performing wrist exercises he reports; Review and discussion of plan of care with patient, who has provided input to plan, verbalized understanding and agrees with current goals. Any recurrence Red Flags or continued symptoms: None;    Medication Regimen Change:  No changes; Completed a review of medications with patient, who verbalized understanding of how and when to take medications. Barriers / Adherence with medications: None identified;    Upcoming Appointments:   Completed initial postop appt yesterday, POA; Future Appointments   Date Time Provider Allyssa Lopez   12/3/2019  2:10 PM PST LAB [facility] PST PST   2019  5:30 PM Thom Middleton MD [facility] PST PST       Patient asked questions appropriately and denied any additional needs at this time. Patient verbalized understanding of all information discussed. Patient has my name and contact information for any follow up needs or questions.      Plan next call:   Continue to follow postop for support and continued safety measures;

## 2019-10-08 ENCOUNTER — PATIENT OUTREACH (OUTPATIENT)
Dept: OTHER | Age: 54
End: 2019-10-08

## 2019-10-08 NOTE — PROGRESS NOTES
Care Manager contacted the patient by telephone in follow up. Verified  and zip code with patient as identifiers. 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 Reports doing well with home exercises, feels he is improving. Has returned to light duty at work this week. Assessment of clinical changes and knowledge demonstrated since last call:   Ongoing Plan of Care:     Impaired Skin Integrity, Acute Pain  Goal:    Demonstrates no signs of infection, red flags in 30 days;  · Reports little pain, mostly stiffness;  · Reports incision site well healed, no dressing at site now;   · Denies redness or signs of infection at site;  · Denies drainage;     Impaired Mobility   Goal:  Demonstrates following safety behaviors to prevent injury and improve ROM postop;  · Reports no longer using sling postop, released to not wear it at night now;  · Reports using his arm a little more, but still with restrictions postop  · Continues with his wrist exercises and increased activity; Review and discussion of plan of care with patient, who has provided input to plan, verbalized understanding and agrees with current goals. Any recurrence Red Flags or continued symptoms: none    Medication Regimen Change:  NA  Completed a review of medications with patient, who verbalized understanding of how and when to take medications. Barriers / Adherence with medications:  none    Upcoming Appointments:    Next postop FU appt on 10/16/19; Future Appointments   Date Time Provider Allyssa Lopez   12/3/2019  2:10 PM PST LAB [facility]   2019  5:30 PM Milagros Gray MD [facility] PST PST       Patient asked questions appropriately and denied any additional needs at this time. Patient verbalized understanding of all information discussed. Patient has my name and contact information for any follow up needs or questions.      Plan next call:    Continue to follow for any new CM needs;

## 2019-11-08 ENCOUNTER — PATIENT OUTREACH (OUTPATIENT)
Dept: OTHER | Age: 54
End: 2019-11-08

## 2019-11-08 NOTE — PROGRESS NOTES
Care Manager contacted the patient by telephone in follow up. Verified  and zip code with patient as identifiers. 48 yo male s/p Release and Debridement of Tendon/Bone and repair of the right elbow tendon.  Reports doing well with home exercises, feels he is improving. Has returned to light duty at work this week. Assessment of clinical changes and knowledge demonstrated since last call:   Ongoing Plan of Care:     Impaired Skin Integrity, Acute Pain  Goal:    Demonstrates no signs of infection, red flags in 30 days;  · Reports pain resolved, stiffness improved;  · Reports incision site well healed;   · Denies drainage, redness or signs of infection at site;     Impaired Mobility   Goal:  Demonstrates following safety behaviors to prevent injury and improve ROM postop;  · Reports using his arm even more now;  · Continues HEP provided;      Review and discussion of plan of care with patient, who has provided input to plan, verbalized understanding and agrees with current goals. Any recurrence Red Flags or continued symptoms: none    Medication Regimen Change: no changes  Completed a review of medications with patient, who verbalized understanding of how and when to take medications. Barriers / Adherence with medications:  none    Upcoming Appointments:    Future Appointments   Date Time Provider Allyssa Lopez   12/3/2019  2:10 PM PST LAB [facility] PST   2019  5:30 PM Ricardo Ann MD [facility]       Patient asked questions appropriately and denied any additional needs at this time. Patient verbalized understanding of all information discussed. Patient has my name and contact information for any follow up needs or questions. Plan next call:   Ready to grad on FU call if no new needs; This note will not be viewable in [address]

## 2019-12-04 ENCOUNTER — PATIENT OUTREACH (OUTPATIENT)
Dept: OTHER | Age: 54
End: 2019-12-04

## 2019-12-04 NOTE — PROGRESS NOTES
CCM Follow Up. Covering for CM Catalino Fabian RN                                                                                          Introduced myself as covering for Catalino Fabian [place] [facility] RN    Telephone attempt to contact patient for transitions of care. No answer and unable to leave voicemail due to mailbox was full. Will inform CM was unable to reach.

## 2019-12-13 ENCOUNTER — PATIENT OUTREACH (OUTPATIENT)
Dept: OTHER | Age: 54
End: 2019-12-13

## 2019-12-13 NOTE — PROGRESS NOTES
Care Manager contacted the patient by telephone in follow up. Verified  and zip code with patient as identifiers. 46 yo male s/p right elbow tendon release, debridement and repair; Reports no problems after returning to work on light duty this past month;      Assessment of clinical changes and knowledge demonstrated since last call:   Ongoing Plan of Care:     Impaired Skin Integrity, Acute Pain  Goal met:    Demonstrates no signs of infection, red flags in 30 days;  · Pain resolved, \"almost back to my original\"  · No infection or incision site complications postop;     Impaired Mobility   Goal met:  Demonstrates following safety behaviors to prevent injury and improve ROM postop;  · Completed postop surgical FU appointments;  · Denies any decrease in ROM, continues HEP;      Review and discussion of plan of care with patient, who has provided input to plan, verbalized understanding and agrees with current goals. Any recurrence Red Flags or continued symptoms:   none    Medication Regimen Change:  No changes  Completed a review of medications with patient, who verbalized understanding of how and when to take medications. Barriers / Adherence with medications:  No problems    Upcoming Appointments:    PCP appt on 19 for FU on DM; Future Appointments   Date Time Provider Allyssa Lopez   2019  5:30 PM Carlos Meneses MD [facility] PST PST       Patient asked questions appropriately and denied any additional needs at this time. Patient verbalized understanding of all information discussed. Patient has my name and contact information for any follow up needs or questions. Plan next call: Will continue to follow for FU appt with PCP, then if new new needs, ready to grad/resolve; This note will not be viewable in [address]

## 2020-01-13 ENCOUNTER — PATIENT OUTREACH (OUTPATIENT)
Dept: OTHER | Age: 55
End: 2020-01-13

## 2020-01-13 NOTE — PROGRESS NOTES
Care Manager contacted the patient by telephone in follow up. Verified  and zip code with patient as identifiers. 48 yo male s/p right elbow tendon release, debridement and repair;  Resolving current episode of case management. Patient has met patient stated and/or medical goals. Goals Met with Plan of Care:   Demonstrates no signs of infection, red flags in 30 days  Demonstrates following safety behaviors to prevent injury and improve ROM postop; Patient consistenly demonstrates understanding of the medical action plan through execution of plan. Appointments with key providers are scheduled and attended. Plan of care modified and updated to address new challenges and barriers with minimal support from the CM team (proactively uses physicians and community resources). The support system remains current and has been modified as needed. Patient continues to acquire needed resources from the current support system established. Teach back demonstrates that patient understands education for self management of chronic conditions. Patient consistenly communicates understanding of signs,symptoms and complications for all major diagnoses. Patient modifies his/her lifestyle toreduce or avoid risk factors to his/her health. ECM will follow as needed and patient has ECM contact information for future needs. This note will not be viewable in [address]

## 2020-12-21 ENCOUNTER — HOSPITAL ENCOUNTER (OUTPATIENT)
Dept: LAB | Age: 55
Discharge: HOME | End: 2020-12-21
Payer: COMMERCIAL

## 2020-12-21 DIAGNOSIS — C81.10 NODULAR SCLEROSIS CLASSICAL HODGKIN LYMPHOMA (HCC): ICD-10-CM

## 2020-12-21 LAB
ALBUMIN SERPL-MCNC: 4.2 G/DL (ref 3.5–5)
ALBUMIN/GLOB SERPL: 1.1 {RATIO} (ref 1.2–3.5)
ALP SERPL-CCNC: 74 U/L (ref 50–136)
ALT SERPL-CCNC: 63 U/L (ref 12–65)
ANION GAP SERPL CALC-SCNC: 3 MMOL/L (ref 7–16)
AST SERPL-CCNC: 42 U/L (ref 15–37)
BASOPHILS # BLD: 0.1 K/UL (ref 0–0.2)
BASOPHILS NFR BLD: 1 % (ref 0–2)
BILIRUB SERPL-MCNC: 0.8 MG/DL (ref 0.2–1.1)
BUN SERPL-MCNC: 15 MG/DL (ref 6–23)
CALCIUM SERPL-MCNC: 9.3 MG/DL (ref 8.3–10.4)
CHLORIDE SERPL-SCNC: 108 MMOL/L (ref 98–107)
CO2 SERPL-SCNC: 29 MMOL/L (ref 21–32)
CREAT SERPL-MCNC: 0.9 MG/DL (ref 0.8–1.5)
DIFFERENTIAL METHOD BLD: NORMAL
EOSINOPHIL # BLD: 0.3 K/UL (ref 0–0.8)
EOSINOPHIL NFR BLD: 4 % (ref 0.5–7.8)
ERYTHROCYTE [DISTWIDTH] IN BLOOD BY AUTOMATED COUNT: 12.9 % (ref 11.9–14.6)
GLOBULIN SER CALC-MCNC: 3.8 G/DL (ref 2.3–3.5)
GLUCOSE SERPL-MCNC: 103 MG/DL (ref 65–100)
HCT VFR BLD AUTO: 49.7 % (ref 41.1–50.3)
HGB BLD-MCNC: 16.6 G/DL (ref 13.6–17.2)
IMM GRANULOCYTES # BLD AUTO: 0 K/UL (ref 0–0.5)
IMM GRANULOCYTES NFR BLD AUTO: 0 % (ref 0–5)
LYMPHOCYTES # BLD: 2.8 K/UL (ref 0.5–4.6)
LYMPHOCYTES NFR BLD: 34 % (ref 13–44)
MCH RBC QN AUTO: 29.7 PG (ref 26.1–32.9)
MCHC RBC AUTO-ENTMCNC: 33.4 G/DL (ref 31.4–35)
MCV RBC AUTO: 89.1 FL (ref 79.6–97.8)
MONOCYTES # BLD: 0.6 K/UL (ref 0.1–1.3)
MONOCYTES NFR BLD: 7 % (ref 4–12)
NEUTS SEG # BLD: 4.4 K/UL (ref 1.7–8.2)
NEUTS SEG NFR BLD: 54 % (ref 43–78)
NRBC # BLD: 0 K/UL (ref 0–0.2)
PLATELET # BLD AUTO: 291 K/UL (ref 150–450)
PMV BLD AUTO: 9.4 FL (ref 9.4–12.3)
POTASSIUM SERPL-SCNC: 4.3 MMOL/L (ref 3.5–5.1)
PROT SERPL-MCNC: 8 G/DL (ref 6.3–8.2)
RBC # BLD AUTO: 5.58 M/UL (ref 4.23–5.67)
SODIUM SERPL-SCNC: 140 MMOL/L (ref 136–145)
VIT B12 SERPL-MCNC: 350 PG/ML (ref 193–986)
WBC # BLD AUTO: 8.1 K/UL (ref 4.3–11.1)

## 2020-12-21 PROCEDURE — 36415 COLL VENOUS BLD VENIPUNCTURE: CPT

## 2020-12-21 PROCEDURE — 82306 VITAMIN D 25 HYDROXY: CPT

## 2020-12-21 PROCEDURE — 86334 IMMUNOFIX E-PHORESIS SERUM: CPT

## 2020-12-21 PROCEDURE — 80074 ACUTE HEPATITIS PANEL: CPT

## 2020-12-21 PROCEDURE — 82607 VITAMIN B-12: CPT

## 2020-12-21 PROCEDURE — 80053 COMPREHEN METABOLIC PANEL: CPT

## 2020-12-21 PROCEDURE — 84165 PROTEIN E-PHORESIS SERUM: CPT

## 2020-12-21 PROCEDURE — 87389 HIV-1 AG W/HIV-1&-2 AB AG IA: CPT

## 2020-12-21 PROCEDURE — 85025 COMPLETE CBC W/AUTO DIFF WBC: CPT

## 2020-12-22 LAB
25(OH)D3+25(OH)D2 SERPL-MCNC: 37.8 NG/ML (ref 30–100)
ALBUMIN SERPL ELPH-MCNC: 4.12 G/DL (ref 3.2–5.6)
ALBUMIN/GLOB SERPL: 1.2 {RATIO} (ref 1.2–3.5)
ALPHA1 GLOB SERPL ELPH-MCNC: 0.16 G/DL (ref 0.1–0.4)
ALPHA2 GLOB SERPL ELPH-MCNC: 0.84 G/DL (ref 0.4–1.2)
B-GLOBULIN SERPL QL ELPH: 1.28 G/DL (ref 0.6–1.3)
GAMMA GLOB MFR SERPL ELPH: 1.2 G/DL (ref 0.5–1.6)
HAV IGM SERPL QL IA: NEGATIVE
HBV CORE IGM SERPL QL IA: NEGATIVE
HBV SURFACE AG SERPL QL IA: NEGATIVE
HCV AB S/CO SERPL IA: <0.1 S/CO RATIO (ref 0–0.9)
HIV 1+2 AB+HIV1 P24 AG SERPL QL IA: NON REACTIVE
IGA SERPL-MCNC: 213 MG/DL (ref 85–499)
IGG SERPL-MCNC: 1270 MG/DL (ref 610–1616)
IGM SERPL-MCNC: 87 MG/DL (ref 35–242)
M PROTEIN SERPL ELPH-MCNC: NORMAL G/DL
PROT PATTERN SERPL ELPH-IMP: NORMAL
PROT PATTERN SPEC IFE-IMP: NORMAL
PROT SERPL-MCNC: 7.6 G/DL (ref 6.3–8.2)

## 2021-06-02 ENCOUNTER — HOSPITAL ENCOUNTER (OUTPATIENT)
Dept: LAB | Age: 56
Discharge: HOME | End: 2021-06-02
Payer: COMMERCIAL

## 2021-06-02 DIAGNOSIS — C81.90 HODGKIN LYMPHOMA, UNSPECIFIED HODGKIN LYMPHOMA TYPE, UNSPECIFIED BODY REGION (HCC): ICD-10-CM

## 2021-06-02 LAB
ALBUMIN SERPL-MCNC: 4.1 G/DL (ref 3.5–5)
ALBUMIN/GLOB SERPL: 1.2 {RATIO} (ref 1.2–3.5)
ALP SERPL-CCNC: 86 U/L (ref 50–136)
ALT SERPL-CCNC: 70 U/L (ref 12–65)
ANION GAP SERPL CALC-SCNC: 3 MMOL/L (ref 7–16)
AST SERPL-CCNC: 54 U/L (ref 15–37)
BASOPHILS # BLD: 0.1 K/UL (ref 0–0.2)
BASOPHILS NFR BLD: 1 % (ref 0–2)
BILIRUB SERPL-MCNC: 0.6 MG/DL (ref 0.2–1.1)
BUN SERPL-MCNC: 13 MG/DL (ref 6–23)
CALCIUM SERPL-MCNC: 9.3 MG/DL (ref 8.3–10.4)
CHLORIDE SERPL-SCNC: 107 MMOL/L (ref 98–107)
CO2 SERPL-SCNC: 29 MMOL/L (ref 21–32)
CREAT SERPL-MCNC: 0.8 MG/DL (ref 0.8–1.5)
DIFFERENTIAL METHOD BLD: ABNORMAL
EOSINOPHIL # BLD: 0.3 K/UL (ref 0–0.8)
EOSINOPHIL NFR BLD: 4 % (ref 0.5–7.8)
ERYTHROCYTE [DISTWIDTH] IN BLOOD BY AUTOMATED COUNT: 13.3 % (ref 11.9–14.6)
GLOBULIN SER CALC-MCNC: 3.4 G/DL (ref 2.3–3.5)
GLUCOSE SERPL-MCNC: 114 MG/DL (ref 65–100)
HCT VFR BLD AUTO: 48.6 %
HGB BLD-MCNC: 16.4 G/DL (ref 13.6–17.2)
IMM GRANULOCYTES # BLD AUTO: 0 K/UL (ref 0–0.5)
IMM GRANULOCYTES NFR BLD AUTO: 0 % (ref 0–5)
LYMPHOCYTES # BLD: 2.7 K/UL (ref 0.5–4.6)
LYMPHOCYTES NFR BLD: 34 % (ref 13–44)
MCH RBC QN AUTO: 29.9 PG (ref 26.1–32.9)
MCHC RBC AUTO-ENTMCNC: 33.7 G/DL (ref 31.4–35)
MCV RBC AUTO: 88.5 FL (ref 79.6–97.8)
MONOCYTES # BLD: 0.6 K/UL (ref 0.1–1.3)
MONOCYTES NFR BLD: 7 % (ref 4–12)
NEUTS SEG # BLD: 4.3 K/UL (ref 1.7–8.2)
NEUTS SEG NFR BLD: 54 % (ref 43–78)
NRBC # BLD: 0 K/UL (ref 0–0.2)
PLATELET # BLD AUTO: 250 K/UL (ref 150–450)
PMV BLD AUTO: 9 FL (ref 9.4–12.3)
POTASSIUM SERPL-SCNC: 4.5 MMOL/L (ref 3.5–5.1)
PROT SERPL-MCNC: 7.5 G/DL (ref 6.3–8.2)
RBC # BLD AUTO: 5.49 M/UL (ref 4.23–5.6)
SODIUM SERPL-SCNC: 139 MMOL/L (ref 136–145)
WBC # BLD AUTO: 7.9 K/UL (ref 4.3–11.1)

## 2021-06-02 PROCEDURE — 80053 COMPREHEN METABOLIC PANEL: CPT

## 2021-06-02 PROCEDURE — 36415 COLL VENOUS BLD VENIPUNCTURE: CPT

## 2021-06-02 PROCEDURE — 85025 COMPLETE CBC W/AUTO DIFF WBC: CPT

## 2022-01-03 ENCOUNTER — HOSPITAL ENCOUNTER (OUTPATIENT)
Dept: LAB | Age: 57
Discharge: HOME | End: 2022-01-03
Payer: COMMERCIAL

## 2022-01-03 DIAGNOSIS — C81.10 NODULAR SCLEROSIS CLASSICAL HODGKIN LYMPHOMA (HCC): ICD-10-CM

## 2022-01-03 LAB
ALBUMIN SERPL-MCNC: 3.9 G/DL (ref 3.5–5)
ALBUMIN/GLOB SERPL: 1 {RATIO} (ref 1.2–3.5)
ALP SERPL-CCNC: 77 U/L (ref 50–136)
ALT SERPL-CCNC: 63 U/L (ref 12–65)
ANION GAP SERPL CALC-SCNC: 4 MMOL/L (ref 7–16)
AST SERPL-CCNC: 50 U/L (ref 15–37)
BASOPHILS # BLD: 0.1 K/UL (ref 0–0.2)
BASOPHILS NFR BLD: 1 % (ref 0–2)
BILIRUB SERPL-MCNC: 0.5 MG/DL (ref 0.2–1.1)
BUN SERPL-MCNC: 13 MG/DL (ref 6–23)
CALCIUM SERPL-MCNC: 9.1 MG/DL (ref 8.3–10.4)
CHLORIDE SERPL-SCNC: 108 MMOL/L (ref 98–107)
CO2 SERPL-SCNC: 27 MMOL/L (ref 21–32)
CREAT SERPL-MCNC: 1 MG/DL (ref 0.8–1.5)
DIFFERENTIAL METHOD BLD: ABNORMAL
EOSINOPHIL # BLD: 0.4 K/UL (ref 0–0.8)
EOSINOPHIL NFR BLD: 4 % (ref 0.5–7.8)
ERYTHROCYTE [DISTWIDTH] IN BLOOD BY AUTOMATED COUNT: 13.2 % (ref 11.9–14.6)
GLOBULIN SER CALC-MCNC: 3.8 G/DL (ref 2.3–3.5)
GLUCOSE SERPL-MCNC: 117 MG/DL (ref 65–100)
HCT VFR BLD AUTO: 45 %
HGB BLD-MCNC: 15.2 G/DL (ref 13.6–17.2)
IMM GRANULOCYTES # BLD AUTO: 0 K/UL (ref 0–0.5)
IMM GRANULOCYTES NFR BLD AUTO: 0 % (ref 0–5)
LYMPHOCYTES # BLD: 3.3 K/UL (ref 0.5–4.6)
LYMPHOCYTES NFR BLD: 36 % (ref 13–44)
MCH RBC QN AUTO: 30.1 PG (ref 26.1–32.9)
MCHC RBC AUTO-ENTMCNC: 33.8 G/DL (ref 31.4–35)
MCV RBC AUTO: 89.1 FL (ref 79.6–97.8)
MONOCYTES # BLD: 0.7 K/UL (ref 0.1–1.3)
MONOCYTES NFR BLD: 8 % (ref 4–12)
NEUTS SEG # BLD: 4.7 K/UL (ref 1.7–8.2)
NEUTS SEG NFR BLD: 51 % (ref 43–78)
NRBC # BLD: 0 K/UL (ref 0–0.2)
PLATELET # BLD AUTO: 255 K/UL (ref 150–450)
PMV BLD AUTO: 9 FL (ref 9.4–12.3)
POTASSIUM SERPL-SCNC: 4.4 MMOL/L (ref 3.5–5.1)
PROT SERPL-MCNC: 7.7 G/DL (ref 6.3–8.2)
RBC # BLD AUTO: 5.05 M/UL (ref 4.23–5.6)
SODIUM SERPL-SCNC: 139 MMOL/L (ref 136–145)
WBC # BLD AUTO: 9.2 K/UL (ref 4.3–11.1)

## 2022-01-03 PROCEDURE — 85025 COMPLETE CBC W/AUTO DIFF WBC: CPT

## 2022-01-03 PROCEDURE — 36415 COLL VENOUS BLD VENIPUNCTURE: CPT

## 2022-01-03 PROCEDURE — 80053 COMPREHEN METABOLIC PANEL: CPT

## 2022-03-19 PROBLEM — E11.9 TYPE 2 DIABETES MELLITUS WITHOUT COMPLICATION, WITHOUT LONG-TERM CURRENT USE OF INSULIN (HCC): Status: ACTIVE | Noted: 2017-04-26

## 2022-03-19 PROBLEM — E66.01 SEVERE OBESITY (HCC): Status: ACTIVE | Noted: 2019-02-05

## 2022-05-19 DIAGNOSIS — I10 ESSENTIAL HYPERTENSION, BENIGN: Primary | ICD-10-CM

## 2022-05-19 DIAGNOSIS — E78.00 PURE HYPERCHOLESTEROLEMIA: ICD-10-CM

## 2022-05-19 DIAGNOSIS — M10.9 GOUT, UNSPECIFIED CAUSE, UNSPECIFIED CHRONICITY, UNSPECIFIED SITE: ICD-10-CM

## 2022-05-19 DIAGNOSIS — E55.9 VITAMIN D DEFICIENCY: ICD-10-CM

## 2022-05-19 DIAGNOSIS — E11.9 TYPE 2 DIABETES MELLITUS WITHOUT COMPLICATION, WITHOUT LONG-TERM CURRENT USE OF INSULIN (HCC): ICD-10-CM

## 2022-06-10 ENCOUNTER — TELEPHONE (OUTPATIENT)
Dept: FAMILY MEDICINE CLINIC | Facility: CLINIC | Age: 57
End: 2022-06-10

## 2022-06-10 NOTE — TELEPHONE ENCOUNTER
Left voicemail for the patient to call the office and reschedule when he has insurance. Cancelled requested appointments    ----- Message from Garth Tang sent at 6/10/2022 12:48 PM EDT -----  Subject: Message to Provider    QUESTIONS  Information for Provider? patient called to reschedule appointment on   6/27/22, needs to also reschedule labs for 6/13/22. he does not have his   insurance yet.   ---------------------------------------------------------------------------  --------------  [address]  What is the best way for the office to contact you? OK to leave message on   voicemail  Preferred Call Back Phone Number? [phone]  ---------------------------------------------------------------------------  --------------  SCRIPT ANSWERS  Relationship to Patient?  Self

## 2022-06-24 ENCOUNTER — TELEPHONE (OUTPATIENT)
Dept: FAMILY MEDICINE CLINIC | Facility: CLINIC | Age: 57
End: 2022-06-24

## 2022-06-24 NOTE — TELEPHONE ENCOUNTER
I received a message from this patient on 6/10 needing to cancel appointments due to not having insurance. Looks like patient has appointment scheduled with Afua Suárez on 7/25 by [address] [place] [address],[address], labs have already been ordered. Patient asking to speak with you, Please return patient's call. Thank you.    ----- Message from [facility] sent at 6/24/2022 10:25 AM EDT -----  Subject: Message to Provider    QUESTIONS  Information for Provider? Please have Beronica Beltran call patient regarding his lab  ---------------------------------------------------------------------------  --------------  CALL BACK INFO  What is the best way for the office to contact you? OK to leave message on   voicemail  Preferred Call Back Phone Number? [phone]  ---------------------------------------------------------------------------  --------------  SCRIPT ANSWERS  Relationship to Patient?  Self

## 2022-07-18 ENCOUNTER — NURSE ONLY (OUTPATIENT)
Dept: FAMILY MEDICINE CLINIC | Facility: CLINIC | Age: 57
End: 2022-07-18

## 2022-07-18 DIAGNOSIS — E11.9 TYPE 2 DIABETES MELLITUS WITHOUT COMPLICATION, WITHOUT LONG-TERM CURRENT USE OF INSULIN (HCC): ICD-10-CM

## 2022-07-18 DIAGNOSIS — E78.00 PURE HYPERCHOLESTEROLEMIA: ICD-10-CM

## 2022-07-18 DIAGNOSIS — E55.9 VITAMIN D DEFICIENCY: ICD-10-CM

## 2022-07-18 DIAGNOSIS — M10.9 GOUT, UNSPECIFIED CAUSE, UNSPECIFIED CHRONICITY, UNSPECIFIED SITE: ICD-10-CM

## 2022-07-18 DIAGNOSIS — I10 ESSENTIAL HYPERTENSION, BENIGN: ICD-10-CM

## 2022-07-18 LAB
25(OH)D3 SERPL-MCNC: 37.2 NG/ML (ref 30–100)
ALBUMIN SERPL-MCNC: 3.9 G/DL (ref 3.5–5)
ALBUMIN/GLOB SERPL: 1.2 {RATIO} (ref 1.2–3.5)
ALP SERPL-CCNC: 66 U/L (ref 50–136)
ALT SERPL-CCNC: 76 U/L (ref 12–65)
ANION GAP SERPL CALC-SCNC: 7 MMOL/L (ref 7–16)
AST SERPL-CCNC: 59 U/L (ref 15–37)
BASOPHILS # BLD: 0.1 K/UL (ref 0–0.2)
BASOPHILS NFR BLD: 1 % (ref 0–2)
BILIRUB SERPL-MCNC: 0.4 MG/DL (ref 0.2–1.1)
BUN SERPL-MCNC: 13 MG/DL (ref 6–23)
CALCIUM SERPL-MCNC: 9.2 MG/DL (ref 8.3–10.4)
CHLORIDE SERPL-SCNC: 107 MMOL/L (ref 98–107)
CHOLEST SERPL-MCNC: 155 MG/DL
CO2 SERPL-SCNC: 27 MMOL/L (ref 21–32)
CREAT SERPL-MCNC: 1.1 MG/DL (ref 0.8–1.5)
DIFFERENTIAL METHOD BLD: NORMAL
EOSINOPHIL # BLD: 0.4 K/UL (ref 0–0.8)
EOSINOPHIL NFR BLD: 5 % (ref 0.5–7.8)
ERYTHROCYTE [DISTWIDTH] IN BLOOD BY AUTOMATED COUNT: 13.8 % (ref 11.9–14.6)
GLOBULIN SER CALC-MCNC: 3.2 G/DL (ref 2.3–3.5)
GLUCOSE SERPL-MCNC: 119 MG/DL (ref 65–100)
HCT VFR BLD AUTO: 50.3 % (ref 41.1–50.3)
HDLC SERPL-MCNC: 31 MG/DL (ref 40–60)
HDLC SERPL: 5 {RATIO}
HGB BLD-MCNC: 16 G/DL (ref 13.6–17.2)
IMM GRANULOCYTES # BLD AUTO: 0 K/UL (ref 0–0.5)
IMM GRANULOCYTES NFR BLD AUTO: 0 % (ref 0–5)
LDLC SERPL CALC-MCNC: 87.6 MG/DL
LYMPHOCYTES # BLD: 2.7 K/UL (ref 0.5–4.6)
LYMPHOCYTES NFR BLD: 33 % (ref 13–44)
MCH RBC QN AUTO: 29.9 PG (ref 26.1–32.9)
MCHC RBC AUTO-ENTMCNC: 31.8 G/DL (ref 31.4–35)
MCV RBC AUTO: 94 FL (ref 79.6–97.8)
MONOCYTES # BLD: 0.6 K/UL (ref 0.1–1.3)
MONOCYTES NFR BLD: 7 % (ref 4–12)
NEUTS SEG # BLD: 4.6 K/UL (ref 1.7–8.2)
NEUTS SEG NFR BLD: 54 % (ref 43–78)
NRBC # BLD: 0 K/UL (ref 0–0.2)
PLATELET # BLD AUTO: 300 K/UL (ref 150–450)
PMV BLD AUTO: 10 FL (ref 9.4–12.3)
POTASSIUM SERPL-SCNC: 4.4 MMOL/L (ref 3.5–5.1)
PROT SERPL-MCNC: 7.1 G/DL (ref 6.3–8.2)
RBC # BLD AUTO: 5.35 M/UL (ref 4.23–5.6)
SODIUM SERPL-SCNC: 141 MMOL/L (ref 136–145)
TRIGL SERPL-MCNC: 182 MG/DL (ref 35–150)
URATE SERPL-MCNC: 4.5 MG/DL (ref 2.6–6)
VLDLC SERPL CALC-MCNC: 36.4 MG/DL (ref 6–23)
WBC # BLD AUTO: 8.4 K/UL (ref 4.3–11.1)

## 2022-07-19 LAB
EST. AVERAGE GLUCOSE BLD GHB EST-MCNC: 128 MG/DL
HBA1C MFR BLD: 6.1 % (ref 4.8–5.6)

## 2022-07-24 PROBLEM — E11.9 TYPE 2 DIABETES MELLITUS WITHOUT COMPLICATION, WITHOUT LONG-TERM CURRENT USE OF INSULIN (HCC): Status: ACTIVE | Noted: 2017-04-26

## 2022-07-24 RX ORDER — ZINC GLUCONATE 50 MG
50 TABLET ORAL DAILY
COMMUNITY
Start: 2021-08-23 | End: 2022-07-25

## 2022-07-24 ASSESSMENT — ENCOUNTER SYMPTOMS
CONSTIPATION: 0
RESPIRATORY NEGATIVE: 1
EYE DISCHARGE: 0
EYES NEGATIVE: 1
DIARRHEA: 0
CHOKING: 0
SHORTNESS OF BREATH: 0
PHOTOPHOBIA: 0
EYE ITCHING: 0
ANAL BLEEDING: 0
SORE THROAT: 0
COUGH: 0
RECTAL PAIN: 0
BLOOD IN STOOL: 0
ALLERGIC/IMMUNOLOGIC NEGATIVE: 1
WHEEZING: 0
CHEST TIGHTNESS: 0
VOMITING: 0
EYE PAIN: 0
SINUS PRESSURE: 0
TROUBLE SWALLOWING: 0
BACK PAIN: 0
STRIDOR: 0
GASTROINTESTINAL NEGATIVE: 1
ABDOMINAL PAIN: 0
ABDOMINAL DISTENTION: 0
COLOR CHANGE: 0
VOICE CHANGE: 0
APNEA: 0
EYE REDNESS: 0
FACIAL SWELLING: 0
RHINORRHEA: 0
SINUS PAIN: 0
NAUSEA: 0

## 2022-07-24 NOTE — PROGRESS NOTES
PROGRESS NOTE    Chief Complaint   Patient presents with    Follow-up     Presenting for follow up to discuss labs, HTN, GERD, HLD and Vit D. Currently taking Fenofibrate, Prilosec, amitiptyline, atenolol and Atorvastation. States that he is compliant with all medication and needs refills sent to [facility] per insurance. SUBJECTIVE:     Diya Lopez is a very pleasant 62 y.o. male with hx of  Hod[place]/lymphoma - currently following oncology, diabetes, hypertension and hyperlipidemia, seen today in office for follow-up, lab results review and medication refill. Diabetes   This is a chronic problem. The problem has not changed since onset. Pertinent negatives include no chest pain, no abdominal  pain, no headaches and no shortness of breath. Nothing aggravates the symptoms. The symptoms are relieved by medications. Treatments tried: xigduo. The treatment provided moderate relief. He also reports having  neuropathy symptoms more along the lines of numbness instead of tingling sensation - controlled with use of amitryptyline daily at bedtime. He has had his annual retinopathy eye examination through [facility] eye and had bilateral cataract removal.     Hypertension    This is a chronic problem. The problem has not changed since onset. Reports compliance with low sodium diet. Pertinent negatives include no chest pain, no orthopnea, no palpitations, no PND, no anxiety, no  confusion, no malaise/fatigue, no blurred vision, no headaches, no tinnitus, no peripheral edema, no dizziness, no shortness of breath, no nausea and no vomiting. Medications have helped lower blood pressure. Risk factors include a sedentary  lifestyle and hypertension. Hyperlipidemia: This is a chronic problem. The problem occurs constantly. The problem has not changed since onset. Pertinent negatives  include no chest pain, no abdominal pain, no headaches and no shortness of breath. Nothing aggravates the symptoms. Treatments tried: Atorvastatin. Currently on atorvastatin. Reports compliance with low fat, low cholesterol diet. The treatment  provided moderate relief. Cold sores:  Patient reports presence of intermittent crack/fissure/sores to bilateral corners of his mouth. Patient reports symptoms wax and wane but has been present intermittently since 6 months ago. Patient reports no aggravating factors. Worsening in discomfort whenever he eats spicy foods. He also reports associated tongue soreness and sensitivity when he eats spicy foods. Rash:  Patient reports having intermittent rash to bilateral upper and lower extremities intermittently whenever he is exposed to poison ivy or oak as he works in [employer] frequently. He has been applying Lotrisone cream with moderate relief. 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 Patient report Lotrisone cream provide good relief and is needing more refill. Past Medical History, Past Surgical History, Family history, Social History, and Medications were all reviewed with the patient today and updated as necessary. Current Outpatient Medications   Medication Sig Dispense Refill    amitriptyline (ELAVIL) 50 MG tablet Take 1 tablet by mouth nightly For sleep/neuropathy 90 tablet 3    atenolol (TENORMIN) 25 MG tablet Take 1 tablet by mouth in the morning. For blood pressure. 90 tablet 2    atorvastatin (LIPITOR) 80 MG tablet Take 1 tablet by mouth in the morning. For cholesterol. 90 tablet 3    celecoxib (CELEBREX) 200 MG capsule Take 1 capsule by mouth in the morning and 1 capsule before bedtime. With food for pain. 180 capsule 3    Dapagliflozin-metFORMIN HCl ER (XIGDUO XR) 5-1000 MG TB24 Take 1 tablet by mouth daily With food for diabetes 90 tablet 3    fenofibrate (TRICOR) 145 MG tablet Take 1 tablet by mouth in the morning.  For cholesterol. 90 tablet 3    omeprazole (PRILOSEC) 40 MG delayed release capsule Take 1 capsule by mouth in the morning. On empty stomach and wait at least 30 minutes before eating daily for stomach reflux. 90 capsule 3    clotrimazole-betamethasone (LOTRISONE) 1-0.05 % cream Apply topically 2 times daily For fungal treatment (athlete's foot) 45 g 5    triamcinolone (ARISTOCORT) 0.5 % cream Apply topically 2 times daily For itching/poison ivy/oak rash 60 g 2    valACYclovir (VALTREX) 1 g tablet Take 1 tablet by mouth in the morning and 1 tablet before bedtime. Do all this for 7 days. With food (Antiviral) for cold sores. 14 tablet 0    nystatin (MYCOSTATIN) 589169 UNIT/ML suspension Take 5 mLs by mouth in the morning and 5 mLs at noon and 5 mLs in the evening and 5 mLs before bedtime. Swish and spit for tongue sensitivity/thrush. 473 mL 2    CPAP Machine MISC 15 cm every night      aspirin 81 MG EC tablet Take 81 mg by mouth daily      Cholecalciferol 50 MCG (2000 UT) TABS Take 2,000 Units by mouth daily       No current facility-administered medications for this visit.      Allergies   Allergen Reactions    Penicillins Rash    Sulfa Antibiotics Rash     Patient Active Problem List   Diagnosis    Hyperlipemia    Hepatomegaly    Arthritis    GERD (gastroesophageal reflux disease)    Chronic kidney disease    Essential hypertension, benign    Reflux esophagitis    Severe obesity (HCC)    Type 2 diabetes mellitus without complication, without long-term current use of insulin (HCC)    KATERINA on CPAP     Past Medical History:   Diagnosis Date    Diabetes ([facility] [address]     type 2- oral agents- does not check glucose- last A1C 5.7- 8/2019- denies hypoglycemia    Generalized osteoarthrosis, unspecified site 9/7/2013    knees    GERD (gastroesophageal reflux disease)     resolves with omeprazole prn--    Hepatomegaly 9/7/2013    History of athlete's foot     History of cerebral venous sinus thrombosis associated with congenital heart disease     Pt denies any knowledge of this dx 9/9/19- Pt denies ever having this dx or symptoms associated with this dx    History of kidney stones     Hodgkin lymphoma ([facility] [address]     dx 2001- no recurrence- chemo and stem cell     Hyperlipidemia     Hypertension     OA (osteoarthritis)     Obstructive sleep apnea (adult) (pediatric) 9/7/2013    uses CPAP    Prediabetes      Past Surgical History:   Procedure Laterality Date    CATARACT REMOVAL Bilateral 01/2022    COLONOSCOPY N/A 12/11/2017    COLONOSCOPY / BMI=31 performed by Shahid Neely MD at [address] [facility]  12/11/2017         LITHOTRIPSY      LYMPH NODE DISSECTION      OTHER SURGICAL HISTORY      Biopsy    SINUS SURGERY PROC UNLISTED      VASCULAR SURGERY      port insertion and removal - chemo     Family History   Problem Relation Age of Onset    Heart Attack Mother 61        MI    Elevated Lipids Mother     Hypertension Mother     Heart Disease Mother     No Known Problems Father     No Known Problems Sister     Mult Sclerosis Sister     Cancer Sister      Social History     Tobacco Use    Smoking status: Never    Smokeless tobacco: Never   Substance Use Topics    Alcohol use: No         REVIEW OF SYSTEM    Review of Systems   Constitutional: Negative. Negative for activity change, appetite change, chills, diaphoresis, fatigue, fever and unexpected weight change. HENT: Negative. Negative for congestion, dental problem, drooling, ear discharge, ear pain, facial swelling, hearing loss, mouth sores, nosebleeds, postnasal drip, rhinorrhea, sinus pressure, sinus pain, sneezing, sore throat, tinnitus, trouble swallowing and voice change. Eyes: Negative. Negative for photophobia, pain, discharge, redness, itching and visual disturbance. Respiratory: Negative. Negative for apnea, cough, choking, chest tightness, shortness of breath, wheezing and stridor. Cardiovascular: Negative.   Negative for chest pain, palpitations and leg swelling. Gastrointestinal: Negative. Negative for abdominal distention, abdominal pain, anal bleeding, blood in stool, constipation, diarrhea, nausea, rectal pain and vomiting. Endocrine: Negative. Negative for cold intolerance, heat intolerance, polydipsia, polyphagia and polyuria. Genitourinary: Negative. Negative for decreased urine volume, difficulty urinating, dysuria, enuresis, flank pain, frequency, genital sores, hematuria and urgency. Musculoskeletal: Negative. Negative for arthralgias, back pain, gait problem, joint swelling, myalgias, neck pain and neck stiffness. Skin:  Positive for rash (to bilateral arms and legs after exposed to poison ivy/oak, heat rash to bilateral feet with frequent moisture). Negative for color change, pallor and wound. Bilateral corners of mouth with sores    Allergic/Immunologic: Negative. Negative for environmental allergies, food allergies and immunocompromised state. Neurological: Negative. Negative for dizziness, tremors, seizures, syncope, facial asymmetry, speech difficulty, weakness, light-headedness, numbness and headaches. Hematological: Negative. Negative for adenopathy. Does not bruise/bleed easily. Psychiatric/Behavioral: Negative. Negative for agitation, behavioral problems, confusion, decreased concentration, dysphoric mood, hallucinations, self-injury, sleep disturbance and suicidal ideas. The patient is not nervous/anxious and is not hyperactive. OBJECTIVE:  /78 (Site: Left Upper Arm, Position: Sitting, Cuff Size: Medium Adult)   Pulse 70   Resp 18   Ht 5' 10.51\" (1.791 m)   Wt 209 lb 6.4 oz (95 kg)   SpO2 98%   BMI 29.61 kg/m²      Physical Exam  Constitutional:       General: He is not in acute distress. Appearance: Normal appearance. He is not ill-appearing, toxic-appearing or diaphoretic. HENT:      Head: Normocephalic and atraumatic.       Right Ear: Tympanic membrane, ear canal and external ear normal. There is no impacted cerumen. Left Ear: Tympanic membrane, ear canal and external ear normal. There is no impacted cerumen. Nose: Nose normal.      Mouth/Throat:      Mouth: Mucous membranes are moist.      Pharynx: Oropharynx is clear. Comments: Tongue moist.  No thrush noted. Bilateral corners of mouth with small ulceration no erythema, no active drainage or discharge  Eyes:      Conjunctiva/sclera: Conjunctivae normal.      Pupils: Pupils are equal, round, and reactive to light. Neck:      Vascular: No carotid bruit. Cardiovascular:      Rate and Rhythm: Normal rate and regular rhythm. Pulses: Normal pulses. Heart sounds: Normal heart sounds. Pulmonary:      Effort: Pulmonary effort is normal.      Breath sounds: Normal breath sounds. Abdominal:      General: Abdomen is flat. Bowel sounds are normal.      Palpations: Abdomen is soft. Musculoskeletal:      Cervical back: Normal range of motion and neck supple. No rigidity or tenderness. Lymphadenopathy:      Cervical: No cervical adenopathy. Neurological:      General: No focal deficit present. Mental Status: He is alert and oriented to person, place, and time. Psychiatric:         Mood and Affect: Mood normal.         Behavior: Behavior normal.         Thought Content: Thought content normal.         Judgment: Judgment normal.         Medical problems and test results were reviewed with the patient today.    Lab Results   Component Value Date     07/18/2022    K 4.4 07/18/2022     07/18/2022    CO2 27 07/18/2022    BUN 13 07/18/2022    CREATININE 1.10 07/18/2022    GLUCOSE 119 (H) 07/18/2022    CALCIUM 9.2 07/18/2022    PROT 7.1 07/18/2022    LABALBU 3.9 07/18/2022    BILITOT 0.4 07/18/2022    ALKPHOS 66 07/18/2022    AST 59 (H) 07/18/2022    ALT 76 (H) 07/18/2022    LABGLOM >60 07/18/2022    GFRAA >60 07/18/2022    AGRATIO 1.6 02/21/2022    GLOB 3.2 07/18/2022     Lab Results Component Value Date    WBC 8.4 07/18/2022    HGB 16.0 07/18/2022    HCT 50.3 07/18/2022    MCV 94.0 07/18/2022     07/18/2022     Lab Results   Component Value Date    LABA1C 6.1 (H) 07/18/2022     Lab Results   Component Value Date     07/18/2022     Lab Results   Component Value Date    CHOL 155 07/18/2022    CHOL 142 02/21/2022    CHOL 143 08/05/2021     Lab Results   Component Value Date    TRIG 182 (H) 07/18/2022    TRIG 261 (H) 02/21/2022    TRIG 178 (H) 08/05/2021     Lab Results   Component Value Date    HDL 31 (L) 07/18/2022    HDL 31 (L) 02/21/2022    HDL 33 (L) 08/05/2021     Lab Results   Component Value Date    LDLCALC 87.6 07/18/2022    LDLCALC 69 02/21/2022    LDLCALC 79 08/05/2021     Lab Results   Component Value Date    LABVLDL 36.4 (H) 07/18/2022    LABVLDL 56 (H) 08/17/2020    LABVLDL 34 12/02/2019    VLDL 42 (H) 02/21/2022    VLDL 31 08/05/2021    VLDL 35 11/16/2020     Lab Results   Component Value Date    CHOLHDLRATIO 5.0 07/18/2022     Lab Results   Component Value Date    URICACID 4.5 07/18/2022     Lab Results   Component Value Date/Time    VITD25 37.2 07/18/2022 08:09 AM            ASSESSMENT and PLAN    1. Oral herpes simplex infection  -     valACYclovir (VALTREX) 1 g tablet; Take 1 tablet by mouth in the morning and 1 tablet before bedtime. Do all this for 7 days. With food (Antiviral) for cold sores. , Disp-14 tablet, R-0Normal    Bilateral corners of mouth with small ulceration that are consistent with oral herpes simplex. We will start patient on Valtrex twice daily for treatment. Advised patient to let us know if symptoms do not improve or resolve.     2. Contact dermatitis due to plants, except food, unspecified contact dermatitis type  -     triamcinolone (ARISTOCORT) 0.5 % cream; Apply topically 2 times daily For itching/poison ivy/oak rash, Topical, 2 TIMES DAILY Starting Mon 7/25/2022, Disp-60 g, R-2, Normal    Advised patient to wear longsleeve clothing and avoidance of exposure to plants. We will have patient start triamcinolone topical cream for treatment. We will hold oral steroid use at this time due to history of diabetes and current lesions are dried and none vesicular of nature. Advised patient on avoidance of continuous daily use more than 10 days due to risk of hypopigmentation and thinning of the skin from steroidal cream use. 3. Tinea pedis of both feet  -     clotrimazole-betamethasone (LOTRISONE) 1-0.05 % cream; Apply topically 2 times daily For fungal treatment (athlete's foot), Topical, 2 TIMES DAILY Starting Mon 7/25/2022, Disp-45 g, R-5, Normal    Patient with reoccurring fungal infection to bilateral feet due to frequent moisture from shoes and heat. Advised patient on supportive care including wearing socks that draw moisture away from skin and frequent change in socks to prevent recurrence. As pt is having good relief with use of lotrisone, will refill meds. Advised patient on avoidance of continuous daily use more than 10 days due to risk of hypopigmentation and thinning of the skin from steroidal cream use. 4. Controlled type 2 diabetes mellitus with neuropathy (HCC)  -     amitriptyline (ELAVIL) 50 MG tablet; Take 1 tablet by mouth nightly For sleep/neuropathy, Disp-90 tablet, R-3Normal  -     Dapagliflozin-metFORMIN HCl ER (XIGDUO XR) 5-1000 MG TB24; Take 1 tablet by mouth daily With food for diabetes, Disp-90 tablet, R-3Normal  -     nystatin (MYCOSTATIN) 731260 UNIT/ML suspension; Take 5 mLs by mouth in the morning and 5 mLs at noon and 5 mLs in the evening and 5 mLs before bedtime. Swish and spit for tongue sensitivity/thrush., Oral, 4 TIMES DAILY Starting Mon 7/25/2022, Disp-473 mL, R-2, Normal  -     TSH; Future  -     AMB POC KTY COMPLETE CBC; Future  -     Comprehensive Metabolic Panel; Future  -     Hemoglobin A1C; Future  -     AMB POC URINE, MICROALBUMIN; Future  -     AMB POC URINALYSIS DIP STICK AUTO W/ MICRO;  Future    A1c is stable at 6.1. Patient has no coverage issues with use of Xigduo at this time. We have patient continue with current therapy and we will recheck labs in 6 months. 5. Pure hypercholesterolemia  -     atorvastatin (LIPITOR) 80 MG tablet; Take 1 tablet by mouth in the morning. For cholesterol., Disp-90 tablet, R-3Normal  -     fenofibrate (TRICOR) 145 MG tablet; Take 1 tablet by mouth in the morning. For cholesterol., Disp-90 tablet, R-3Normal  -     Lipid Panel; Future    Lipid panel stable with LDL at 87. Although LDL is not quite at goal of 70 and under, we will have patient continue with atorvastatin 80 mg once daily and fenofibrate of 145 mg daily. Advised patient on strict low-fat, low-cholesterol diet along with increasing exercise regimen to maintain and reduce LDL level to 70 and or under for prevention of ASCVD risk. We will repeat fasting labs in 6 months. 6. Essential hypertension, benign  -     atenolol (TENORMIN) 25 MG tablet; Take 1 tablet by mouth in the morning. For blood pressure., Disp-90 tablet, R-2Normal    Blood pressure today in office is 134/78. Stable. Continue atenolol 25 mg daily. 7. Vitamin D deficiency  -     Vitamin D 25 Hydroxy; Future    Vitamin D level is stable. Continue with current therapy at this time. Repeat fasting labs in 6 months for monitoring. 8. Encounter for immunization  -     Tdap, [address], (age 8 yrs+), IM    Patient is due for Tdap vaccine. Discussed current CDC recommendation for immunization. Pt is amenable to receiving vaccine today in office. VIS provided. Vaccine administered. 9. Arthritis  -     celecoxib (CELEBREX) 200 MG capsule; Take 1 capsule by mouth in the morning and 1 capsule before bedtime. With food for pain., Disp-180 capsule, R-3Normal    Stable on Celebrex with good relief. Rx refilled.        Orders Placed This Encounter   Procedures    Tdap, BOOSTRIX, (age 8 yrs+), IM    TSH     Standing Status:   Future     Standing Expiration Date:   7/25/2023    Vitamin D 25 Hydroxy     Standing Status:   Future     Standing Expiration Date:   7/25/2023    Lipid Panel     Standing Status:   Future     Standing Expiration Date:   7/25/2023    Comprehensive Metabolic Panel     Standing Status:   Future     Standing Expiration Date:   7/25/2023    Hemoglobin A1C     Standing Status:   Future     Standing Expiration Date:   7/25/2023    AMB POC KTY COMPLETE CBC     Standing Status:   Future     Standing Expiration Date:   7/25/2023    AMB POC URINE, MICROALBUMIN     Standing Status:   Future     Standing Expiration Date:   7/25/2023    AMB POC URINALYSIS DIP STICK AUTO W/ MICRO     Standing Status:   Future     Standing Expiration Date:   7/25/2023           Elements of this note have been dictated using speech recognition software. As a result, errors of speech recognition may have occurred. On this date 7/25/2022 I have spent 30 minutes reviewing previous notes, test results and face to face with the patient discussing the diagnosis and importance of compliance with the treatment plan as well as documenting on the day of the visit. Greater than 50% of this visit was spent counseling the patient about test results, prognosis, importance of compliance, education about disease process, benefits of medications, instructions for management of acute symptoms, and follow up plans. Return in about 6 months (around 1/25/2023) for Follow up with fasting labs prior.      ALYSA Meek - CNP

## 2022-07-25 ENCOUNTER — OFFICE VISIT (OUTPATIENT)
Dept: FAMILY MEDICINE CLINIC | Facility: CLINIC | Age: 57
End: 2022-07-25
Payer: COMMERCIAL

## 2022-07-25 VITALS
DIASTOLIC BLOOD PRESSURE: 78 MMHG | HEART RATE: 70 BPM | SYSTOLIC BLOOD PRESSURE: 134 MMHG | BODY MASS INDEX: 29.31 KG/M2 | HEIGHT: 71 IN | WEIGHT: 209.4 LBS | RESPIRATION RATE: 18 BRPM | OXYGEN SATURATION: 98 %

## 2022-07-25 DIAGNOSIS — M19.90 ARTHRITIS: ICD-10-CM

## 2022-07-25 DIAGNOSIS — B35.3 TINEA PEDIS OF BOTH FEET: ICD-10-CM

## 2022-07-25 DIAGNOSIS — E55.9 VITAMIN D DEFICIENCY: ICD-10-CM

## 2022-07-25 DIAGNOSIS — L25.5 CONTACT DERMATITIS DUE TO PLANTS, EXCEPT FOOD, UNSPECIFIED CONTACT DERMATITIS TYPE: ICD-10-CM

## 2022-07-25 DIAGNOSIS — B00.2 ORAL HERPES SIMPLEX INFECTION: Primary | ICD-10-CM

## 2022-07-25 DIAGNOSIS — E11.40 CONTROLLED TYPE 2 DIABETES MELLITUS WITH NEUROPATHY (HCC): ICD-10-CM

## 2022-07-25 DIAGNOSIS — Z23 ENCOUNTER FOR IMMUNIZATION: ICD-10-CM

## 2022-07-25 DIAGNOSIS — E78.00 PURE HYPERCHOLESTEROLEMIA: ICD-10-CM

## 2022-07-25 DIAGNOSIS — I10 ESSENTIAL HYPERTENSION, BENIGN: ICD-10-CM

## 2022-07-25 PROCEDURE — 90471 IMMUNIZATION ADMIN: CPT | Performed by: NURSE PRACTITIONER

## 2022-07-25 PROCEDURE — 99214 OFFICE O/P EST MOD 30 MIN: CPT | Performed by: NURSE PRACTITIONER

## 2022-07-25 PROCEDURE — 90715 TDAP VACCINE 7 YRS/> IM: CPT | Performed by: NURSE PRACTITIONER

## 2022-07-25 PROCEDURE — 3044F HG A1C LEVEL LT 7.0%: CPT | Performed by: NURSE PRACTITIONER

## 2022-07-25 RX ORDER — VALACYCLOVIR HYDROCHLORIDE 1 G/1
1000 TABLET, FILM COATED ORAL 2 TIMES DAILY
Qty: 14 TABLET | Refills: 0 | Status: SHIPPED | OUTPATIENT
Start: 2022-07-25 | End: 2022-08-01

## 2022-07-25 RX ORDER — FENOFIBRATE 145 MG/1
145 TABLET, COATED ORAL DAILY
Qty: 90 TABLET | Refills: 3 | Status: SHIPPED | OUTPATIENT
Start: 2022-07-25

## 2022-07-25 RX ORDER — ATENOLOL 25 MG/1
25 TABLET ORAL DAILY
Qty: 90 TABLET | Refills: 2 | Status: SHIPPED | OUTPATIENT
Start: 2022-07-25

## 2022-07-25 RX ORDER — CLOTRIMAZOLE AND BETAMETHASONE DIPROPIONATE 10; .64 MG/G; MG/G
CREAM TOPICAL 2 TIMES DAILY
Qty: 45 G | Refills: 5 | Status: SHIPPED | OUTPATIENT
Start: 2022-07-25

## 2022-07-25 RX ORDER — DAPAGLIFLOZIN AND METFORMIN HYDROCHLORIDE 5; 1000 MG/1; MG/1
1 TABLET, FILM COATED, EXTENDED RELEASE ORAL DAILY
Qty: 90 TABLET | Refills: 3 | Status: SHIPPED | OUTPATIENT
Start: 2022-07-25

## 2022-07-25 RX ORDER — OMEPRAZOLE 40 MG/1
40 CAPSULE, DELAYED RELEASE ORAL DAILY
Qty: 90 CAPSULE | Refills: 3 | Status: SHIPPED | OUTPATIENT
Start: 2022-07-25

## 2022-07-25 RX ORDER — ATORVASTATIN CALCIUM 80 MG/1
80 TABLET, FILM COATED ORAL DAILY
Qty: 90 TABLET | Refills: 3 | Status: SHIPPED | OUTPATIENT
Start: 2022-07-25

## 2022-07-25 RX ORDER — TRIAMCINOLONE ACETONIDE 5 MG/G
CREAM TOPICAL 2 TIMES DAILY
Qty: 60 G | Refills: 2 | Status: SHIPPED | OUTPATIENT
Start: 2022-07-25

## 2022-07-25 RX ORDER — CELECOXIB 200 MG/1
200 CAPSULE ORAL 2 TIMES DAILY
Qty: 180 CAPSULE | Refills: 3 | Status: SHIPPED | OUTPATIENT
Start: 2022-07-25

## 2022-07-25 RX ORDER — AMITRIPTYLINE HYDROCHLORIDE 50 MG/1
50 TABLET, FILM COATED ORAL NIGHTLY
Qty: 90 TABLET | Refills: 3 | Status: SHIPPED | OUTPATIENT
Start: 2022-07-25

## 2022-12-04 ENCOUNTER — HOSPITAL ENCOUNTER (EMERGENCY)
Age: 57
Discharge: HOME | End: 2022-12-04
Attending: EMERGENCY MEDICINE
Payer: COMMERCIAL

## 2022-12-04 ENCOUNTER — APPOINTMENT (OUTPATIENT)
Dept: CT IMAGING | Age: 57
End: 2022-12-04
Payer: COMMERCIAL

## 2022-12-04 VITALS
DIASTOLIC BLOOD PRESSURE: 100 MMHG | HEART RATE: 76 BPM | SYSTOLIC BLOOD PRESSURE: 143 MMHG | RESPIRATION RATE: 18 BRPM | OXYGEN SATURATION: 96 % | HEIGHT: 70 IN | TEMPERATURE: 98.4 F | WEIGHT: 230 LBS | BODY MASS INDEX: 32.93 KG/M2

## 2022-12-04 DIAGNOSIS — M54.50 ACUTE LEFT-SIDED LOW BACK PAIN WITHOUT SCIATICA: ICD-10-CM

## 2022-12-04 DIAGNOSIS — N20.0 MULTIPLE KIDNEY STONES: Primary | ICD-10-CM

## 2022-12-04 DIAGNOSIS — K59.00 CONSTIPATION, UNSPECIFIED CONSTIPATION TYPE: ICD-10-CM

## 2022-12-04 LAB
ALBUMIN SERPL-MCNC: 4.4 G/DL (ref 3.5–5)
ALBUMIN/GLOB SERPL: 1.1 {RATIO} (ref 0.4–1.6)
ALP SERPL-CCNC: 61 U/L (ref 50–136)
ALT SERPL-CCNC: 76 U/L (ref 12–65)
ANION GAP SERPL CALC-SCNC: 6 MMOL/L (ref 2–11)
APPEARANCE UR: ABNORMAL
AST SERPL-CCNC: 51 U/L (ref 15–37)
BACTERIA URNS QL MICRO: 0 /HPF
BASOPHILS # BLD: 0.1 K/UL (ref 0–0.2)
BASOPHILS NFR BLD: 1 % (ref 0–2)
BILIRUB SERPL-MCNC: 0.8 MG/DL (ref 0.2–1.1)
BILIRUB UR QL: NEGATIVE
BUN SERPL-MCNC: 19 MG/DL (ref 6–23)
CALCIUM SERPL-MCNC: 9.4 MG/DL (ref 8.3–10.4)
CHLORIDE SERPL-SCNC: 107 MMOL/L (ref 101–110)
CO2 SERPL-SCNC: 26 MMOL/L (ref 21–32)
COLOR UR: ABNORMAL
CREAT SERPL-MCNC: 1.4 MG/DL (ref 0.8–1.5)
DIFFERENTIAL METHOD BLD: ABNORMAL
EOSINOPHIL # BLD: 0.2 K/UL (ref 0–0.8)
EOSINOPHIL NFR BLD: 2 % (ref 0.5–7.8)
EPI CELLS #/AREA URNS HPF: ABNORMAL /HPF
ERYTHROCYTE [DISTWIDTH] IN BLOOD BY AUTOMATED COUNT: 13.2 % (ref 11.9–14.6)
GLOBULIN SER CALC-MCNC: 4 G/DL (ref 2.8–4.5)
GLUCOSE SERPL-MCNC: 108 MG/DL (ref 65–100)
GLUCOSE UR STRIP.AUTO-MCNC: >1000 MG/DL
HCT VFR BLD AUTO: 53.1 % (ref 41.1–50.3)
HGB BLD-MCNC: 17.4 G/DL (ref 13.6–17.2)
HGB UR QL STRIP: ABNORMAL
IMM GRANULOCYTES # BLD AUTO: 0 K/UL (ref 0–0.5)
IMM GRANULOCYTES NFR BLD AUTO: 0 % (ref 0–5)
KETONES UR QL STRIP.AUTO: ABNORMAL MG/DL
LEUKOCYTE ESTERASE UR QL STRIP.AUTO: NEGATIVE
LIPASE SERPL-CCNC: 91 U/L (ref 73–393)
LYMPHOCYTES # BLD: 3.5 K/UL (ref 0.5–4.6)
LYMPHOCYTES NFR BLD: 33 % (ref 13–44)
MCH RBC QN AUTO: 29.8 PG (ref 26.1–32.9)
MCHC RBC AUTO-ENTMCNC: 32.8 G/DL (ref 31.4–35)
MCV RBC AUTO: 91.1 FL (ref 82–102)
MONOCYTES # BLD: 0.7 K/UL (ref 0.1–1.3)
MONOCYTES NFR BLD: 6 % (ref 4–12)
MUCOUS THREADS URNS QL MICRO: ABNORMAL /LPF
NEUTS SEG # BLD: 6.2 K/UL (ref 1.7–8.2)
NEUTS SEG NFR BLD: 58 % (ref 43–78)
NITRITE UR QL STRIP.AUTO: NEGATIVE
NRBC # BLD: 0 K/UL (ref 0–0.2)
OTHER OBSERVATIONS: ABNORMAL
PH UR STRIP: 5 [PH] (ref 5–9)
PLATELET # BLD AUTO: 334 K/UL (ref 150–450)
PMV BLD AUTO: 9 FL (ref 9.4–12.3)
POTASSIUM SERPL-SCNC: 4.1 MMOL/L (ref 3.5–5.1)
PROT SERPL-MCNC: 8.4 G/DL (ref 6.3–8.2)
PROT UR STRIP-MCNC: 30 MG/DL
RBC # BLD AUTO: 5.83 M/UL (ref 4.23–5.6)
RBC #/AREA URNS HPF: >100 /HPF
SODIUM SERPL-SCNC: 139 MMOL/L (ref 133–143)
SP GR UR REFRACTOMETRY: 1.03 (ref 1–1.02)
UROBILINOGEN UR QL STRIP.AUTO: 0.2 EU/DL (ref 0.2–1)
WBC # BLD AUTO: 10.6 K/UL (ref 4.3–11.1)
WBC URNS QL MICRO: ABNORMAL /HPF

## 2022-12-04 PROCEDURE — 96361 HYDRATE IV INFUSION ADD-ON: CPT

## 2022-12-04 PROCEDURE — 81001 URINALYSIS AUTO W/SCOPE: CPT

## 2022-12-04 PROCEDURE — 6370000000 HC RX 637 (ALT 250 FOR IP): Performed by: EMERGENCY MEDICINE

## 2022-12-04 PROCEDURE — 6360000002 HC RX W HCPCS: Performed by: EMERGENCY MEDICINE

## 2022-12-04 PROCEDURE — 99284 EMERGENCY DEPT VISIT MOD MDM: CPT

## 2022-12-04 PROCEDURE — 74176 CT ABD & PELVIS W/O CONTRAST: CPT

## 2022-12-04 PROCEDURE — 2580000003 HC RX 258: Performed by: EMERGENCY MEDICINE

## 2022-12-04 PROCEDURE — 80053 COMPREHEN METABOLIC PANEL: CPT

## 2022-12-04 PROCEDURE — 83690 ASSAY OF LIPASE: CPT

## 2022-12-04 PROCEDURE — 85025 COMPLETE CBC W/AUTO DIFF WBC: CPT

## 2022-12-04 PROCEDURE — 96374 THER/PROPH/DIAG INJ IV PUSH: CPT

## 2022-12-04 RX ORDER — PREDNISONE 50 MG/1
50 TABLET ORAL DAILY
Qty: 5 TABLET | Refills: 0 | Status: SHIPPED | OUTPATIENT
Start: 2022-12-04 | End: 2022-12-09

## 2022-12-04 RX ORDER — 0.9 % SODIUM CHLORIDE 0.9 %
1000 INTRAVENOUS SOLUTION INTRAVENOUS ONCE
Status: COMPLETED | OUTPATIENT
Start: 2022-12-04 | End: 2022-12-04

## 2022-12-04 RX ORDER — KETOROLAC TROMETHAMINE 30 MG/ML
15 INJECTION, SOLUTION INTRAMUSCULAR; INTRAVENOUS EVERY 6 HOURS PRN
Status: DISCONTINUED | OUTPATIENT
Start: 2022-12-04 | End: 2022-12-04 | Stop reason: HOSPADM

## 2022-12-04 RX ORDER — MAGNESIUM CARB/ALUMINUM HYDROX 105-160MG
296 TABLET,CHEWABLE ORAL ONCE
Qty: 296 ML | Refills: 0 | Status: SHIPPED | OUTPATIENT
Start: 2022-12-04 | End: 2022-12-04

## 2022-12-04 RX ADMIN — SODIUM CHLORIDE 1000 ML: 9 INJECTION, SOLUTION INTRAVENOUS at 14:45

## 2022-12-04 RX ADMIN — KETOROLAC TROMETHAMINE 15 MG: 30 INJECTION, SOLUTION INTRAMUSCULAR at 14:46

## 2022-12-04 RX ADMIN — CITROMA MAGNESIUM CITRATE 296 ML: 1.75 LIQUID ORAL at 17:32

## 2022-12-04 ASSESSMENT — PAIN - FUNCTIONAL ASSESSMENT: PAIN_FUNCTIONAL_ASSESSMENT: 0-10

## 2022-12-04 ASSESSMENT — PAIN DESCRIPTION - LOCATION: LOCATION: BACK

## 2022-12-04 ASSESSMENT — PAIN SCALES - GENERAL: PAINLEVEL_OUTOF10: 6

## 2022-12-04 ASSESSMENT — ENCOUNTER SYMPTOMS
CONSTIPATION: 1
ABDOMINAL PAIN: 1
BACK PAIN: 1
VOMITING: 0
SHORTNESS OF BREATH: 0

## 2022-12-04 ASSESSMENT — PAIN DESCRIPTION - PAIN TYPE: TYPE: ACUTE PAIN

## 2022-12-04 NOTE — ED TRIAGE NOTES
Pt ambulatory to triage for reports of lower back pain & abdominal pain. Pt also endorsing constipation, last BM 6 days ago. Pt states he tried to use colace & an enema yesterday without much relief. Pt sent here from urgent care this morning for possible kidney stones or bowel blockage. Pt with hx of kidney stones, denies fevers, n/v/d. Pt a&ox4.

## 2022-12-04 NOTE — ED NOTES
I have reviewed discharge instructions with the patient. The patient verbalized understanding. Patient left ED via Discharge Method: ambulatory to Home with spouse. Opportunity for questions and clarification provided. Patient given 2 scripts. To continue your aftercare when you leave the hospital, you may receive an automated call from our care team to check in on how you are doing. This is a free service and part of our promise to provide the best care and service to meet your aftercare needs.  If you have questions, or wish to unsubscribe from this service please call [phone]. Thank you for Choosing our [address] [place] [address] Emergency Department.         [address] [facility], RN  12/04/22 0225

## 2022-12-04 NOTE — ED PROVIDER NOTES
Emergency Department Provider Note                   PCP:                ALYSA Rosario - WILLIAN               Age: 62 y.o. Sex: male       ICD-10-CM    1. Multiple kidney stones  N20.0       2. Constipation, unspecified constipation type  K59.00       3. Acute left-sided low back pain without sciatica  M54.50           DISPOSITION Decision To Discharge 12/04/2022 04:42:07 PM       MDM  Number of Diagnoses or Management Options  Acute left-sided low back pain without sciatica  Constipation, unspecified constipation type  Multiple kidney stones  Diagnosis management comments: We will start with labs and CT without contrast.  Patient could have large stone causing his back pain and difficulty urinating. He could have just low back strain and constipation. CT will help evaluate for any obstruction although he has normal bowel sounds and no history of abdominal surgery. He was given fluids and Toradol. CT shows passed stone. Also shows large stones up in the kidney. Will need referral to urology. I reviewed the CT and he has stool throughout. Will give mag citrate and recs on Miralax use. Pt is likely slightly dehydrated as well (heme concentration) and will need followup. He received fluids in ED. Given his constipation, he will not be given narcotics for back pain and will rx prednisone as he is also on celebrex. Amount and/or Complexity of Data Reviewed  Clinical lab tests: ordered and reviewed  Tests in the radiology section of CPT®: ordered and reviewed  Review and summarize past medical records: yes (Per onc note 1/2022. Diagnosis in 2001.   ASSESSMENT:    classical Hodgkin Lymphoma, Nodular Sclerosis sub-type, Stage II-B; s/p Autologous PBHCT; Hypertension.)    Risk of Complications, Morbidity, and/or Mortality  Presenting problems: moderate  Diagnostic procedures: minimal  Management options: moderate    Patient Progress  Patient progress: improved             Orders Placed This Encounter   Procedures    CT ABDOMEN PELVIS RENAL STONE    CBC with Auto Differential    CMP    Lipase    Urinalysis        Medications   ketorolac (TORADOL) injection 15 mg (15 mg IntraVENous Given 12/4/22 1446)   0.9 % sodium chloride bolus (1,000 mLs IntraVENous New Bag 12/4/22 1445)       New Prescriptions    No medications on file        Darius Bonner is a 62 y.o. male who presents to the Emergency Department with chief complaint of    Chief Complaint   Patient presents with    Back Pain    Constipation      Has hx of kidney stone, having low back pain and concern that he has pulled muscle and also kidney stones. Went to  for not having BM x6 days and was sent here after they did xrays. Pt states they were concerned he was obstructed and states they told him he had kidney stones too big to pass on the xray. Feels like he needs to go but it's not low enough. Took an enema and took 2 colace without results. He does not have a urologist.  Having some difficulty with urination. No vomiting. No fever. Denies any new numbness or tingling associated with his LBP and states it mostly hurts with change of position. No surgery on abd. No tob, etoh. The history is provided by the patient. All other systems reviewed and are negative unless otherwise stated in the history of present illness section. Review of Systems   Constitutional:  Negative for chills and fever. Respiratory:  Negative for shortness of breath. Cardiovascular:  Negative for chest pain. Gastrointestinal:  Positive for abdominal pain and constipation. Negative for vomiting. Genitourinary:  Positive for difficulty urinating. Musculoskeletal:  Positive for back pain. Skin:  Negative for rash and wound. Neurological:  Negative for syncope. Psychiatric/Behavioral:  Negative for agitation and behavioral problems. All other systems reviewed and are negative.     Past Medical History:   Diagnosis Date    Diabetes ([facility] [address] type 2- oral agents- does not check glucose- last A1C 5.7- 8/2019- denies hypoglycemia    Generalized osteoarthrosis, unspecified site 9/7/2013    knees    GERD (gastroesophageal reflux disease)     resolves with omeprazole prn--    Hepatomegaly 9/7/2013    History of athlete's foot     History of cerebral venous sinus thrombosis associated with congenital heart disease     Pt denies any knowledge of this dx 9/9/19- Pt denies ever having this dx or symptoms associated with this dx    History of kidney stones     Hodgkin lymphoma ([facility] [address]     dx 2001- no recurrence- chemo and stem cell     Hyperlipidemia     Hypertension     OA (osteoarthritis)     Obstructive sleep apnea (adult) (pediatric) 9/7/2013    uses CPAP    Prediabetes         Past Surgical History:   Procedure Laterality Date    CATARACT REMOVAL Bilateral 01/2022    COLONOSCOPY N/A 12/11/2017    COLONOSCOPY / BMI=31 performed by Stephen New MD at [facility] W/REMOVAL LESION BY HOT [address] [place] [address] Road  12/11/2017         LITHOTRIPSY      LYMPH NODE DISSECTION      OTHER SURGICAL HISTORY      Biopsy    SINUS SURGERY PROC UNLISTED      VASCULAR SURGERY      port insertion and removal - chemo        Family History   Problem Relation Age of Onset    Heart Attack Mother 61        MI    Elevated Lipids Mother     Hypertension Mother     Heart Disease Mother     No Known Problems Father     No Known Problems Sister     Mult Sclerosis Sister     Cancer Sister         Social History     Socioeconomic History    Marital status:    Tobacco Use    Smoking status: Never    Smokeless tobacco: Never   Substance and Sexual Activity    Alcohol use: No    Drug use: No        Allergies: Penicillins and Sulfa antibiotics    Previous Medications    AMITRIPTYLINE (ELAVIL) 50 MG TABLET    Take 1 tablet by mouth nightly For sleep/neuropathy    ASPIRIN 81 MG EC TABLET    Take 81 mg by mouth daily    ATENOLOL (TENORMIN) 25 MG TABLET    Take 1 tablet by mouth in the morning. For blood pressure. ATORVASTATIN (LIPITOR) 80 MG TABLET    Take 1 tablet by mouth in the morning. For cholesterol. CELECOXIB (CELEBREX) 200 MG CAPSULE    Take 1 capsule by mouth in the morning and 1 capsule before bedtime. With food for pain. CHOLECALCIFEROL 50 MCG (2000 UT) TABS    Take 2,000 Units by mouth daily    CLOTRIMAZOLE-BETAMETHASONE (LOTRISONE) 1-0.05 % CREAM    Apply topically 2 times daily For fungal treatment (athlete's foot)    CPAP MACHINE MISC    15 cm every night    DAPAGLIFLOZIN-METFORMIN HCL ER (XIGDUO XR) 5-1000 MG TB24    Take 1 tablet by mouth daily With food for diabetes    FENOFIBRATE (TRICOR) 145 MG TABLET    Take 1 tablet by mouth in the morning. For cholesterol. NYSTATIN (MYCOSTATIN) 681416 UNIT/ML SUSPENSION    Take 5 mLs by mouth in the morning and 5 mLs at noon and 5 mLs in the evening and 5 mLs before bedtime. Swish and spit for tongue sensitivity/thrush. OMEPRAZOLE (PRILOSEC) 40 MG DELAYED RELEASE CAPSULE    Take 1 capsule by mouth in the morning. On empty stomach and wait at least 30 minutes before eating daily for stomach reflux. TRIAMCINOLONE (ARISTOCORT) 0.5 % CREAM    Apply topically 2 times daily For itching/poison ivy/oak rash        Vitals signs and nursing note reviewed. Patient Vitals for the past 4 hrs:   Temp Pulse Resp BP SpO2   12/04/22 1319 98.4 °F (36.9 °C) 76 18 (!) 143/100 96 %          Physical Exam  Vitals and nursing note reviewed. Constitutional:       General: He is not in acute distress. Appearance: Normal appearance. He is well-developed. He is obese. He is not ill-appearing. HENT:      Head: Normocephalic and atraumatic. Mouth/Throat:      Mouth: Mucous membranes are moist.      Pharynx: Oropharynx is clear. Eyes:      General: No scleral icterus. Extraocular Movements: Extraocular movements intact. Cardiovascular:      Rate and Rhythm: Normal rate and regular rhythm.    Pulmonary:      Effort: Pulmonary effort is normal.      Breath sounds: Normal breath sounds. Abdominal:      General: Bowel sounds are normal. There is distension. Palpations: Abdomen is soft. Tenderness: There is no abdominal tenderness. Musculoskeletal:         General: No tenderness (no pain elicited with palpation of low back). Normal range of motion. Cervical back: Normal range of motion and neck supple. Right lower leg: No edema. Left lower leg: No edema. Skin:     General: Skin is warm and dry. Coloration: Skin is not jaundiced. Neurological:      General: No focal deficit present. Mental Status: He is alert. Cranial Nerves: No cranial nerve deficit. Psychiatric:         Mood and Affect: Mood normal.         Behavior: Behavior normal.              Results for orders placed or performed during the hospital encounter of 12/04/22   CT ABDOMEN PELVIS RENAL STONE    Narrative    CT of the abdomen and pelvis without contrast.    CLINICAL INDICATION: Left flank pain and abdominal pain, constipation    PROCEDURE: Serial thin-section axial images obtained from the upper abdomen  through the proximal femurs without the administration of intravenous or oral  contrast.  Radiation dose reduction techniques were used for this study. Our CT  scanners use one or all of the following: Automated exposure control, adjusted  of the mA and/or kV according to patient size, iterative reconstruction    COMPARISON: No prior    FINDINGS:  Evaluation of the hollow and solid viscera is limited by the lack of  intravenous contrast.    CT ABDOMEN: There are bilateral nonobstructing parapelvic stones. The stones or  bulkier on the left measuring from 1 to 15 mm. There is no hydronephrosis or  hydroureter. No ureteral calculi evident. The stones along the right renal  pelvis measure from 1 to 4 mm. There is no hydronephrosis or hydroureter. No  ureteral calculi evident.   There is a small anterior 1.5 cm left renal cyst.   The adrenal glands are normal.  No gallstones evident. The appendix is normal.   The bowel is normal in course, caliber, and wall thickness. There is a large  amount stool throughout the entirety of the colon in keeping with constipation. CT PELVIS: There is a tiny 1 mm stone in the bladder lumen adjacent to the left  UVJ most in keeping with a recently passed stone. The left distal ureter is  decompressed. No abnormality noted in the distal right ureter. The rectum is  unremarkable. No aggressive bone lesions identified. Impression    1. Tiny 1 mm stone dependently in the bladder lumen adjacent to the left UVJ in  keeping with a recently passed stone. 2.  Bilateral nonobstructing parapelvic renal stones. 3.  No hydronephrosis or hydroureter.   4.  Constipation suspected     CBC with Auto Differential   Result Value Ref Range    WBC 10.6 4.3 - 11.1 K/uL    RBC 5.83 (H) 4.23 - 5.6 M/uL    Hemoglobin 17.4 (H) 13.6 - 17.2 g/dL    Hematocrit 53.1 (H) 41.1 - 50.3 %    MCV 91.1 82 - 102 FL    MCH 29.8 26.1 - 32.9 PG    MCHC 32.8 31.4 - 35.0 g/dL    RDW 13.2 11.9 - 14.6 %    Platelets 349 579 - 808 K/uL    MPV 9.0 (L) 9.4 - 12.3 FL    nRBC 0.00 0.0 - 0.2 K/uL    Differential Type AUTOMATED      Seg Neutrophils 58 43 - 78 %    Lymphocytes 33 13 - 44 %    Monocytes 6 4.0 - 12.0 %    Eosinophils % 2 0.5 - 7.8 %    Basophils 1 0.0 - 2.0 %    Immature Granulocytes 0 0.0 - 5.0 %    Segs Absolute 6.2 1.7 - 8.2 K/UL    Absolute Lymph # 3.5 0.5 - 4.6 K/UL    Absolute Mono # 0.7 0.1 - 1.3 K/UL    Absolute Eos # 0.2 0.0 - 0.8 K/UL    Basophils Absolute 0.1 0.0 - 0.2 K/UL    Absolute Immature Granulocyte 0.0 0.0 - 0.5 K/UL   CMP   Result Value Ref Range    Sodium 139 133 - 143 mmol/L    Potassium 4.1 3.5 - 5.1 mmol/L    Chloride 107 101 - 110 mmol/L    CO2 26 21 - 32 mmol/L    Anion Gap 6 2 - 11 mmol/L    Glucose 108 (H) 65 - 100 mg/dL    BUN 19 6 - 23 MG/DL    Creatinine 1.40 0.8 - 1.5 MG/DL    Est, Glom Filt Rate 59 (L) >60 ml/min/1.73m2    Calcium 9.4 8.3 - 10.4 MG/DL    Total Bilirubin 0.8 0.2 - 1.1 MG/DL    ALT 76 (H) 12 - 65 U/L    AST 51 (H) 15 - 37 U/L    Alk Phosphatase 61 50 - 136 U/L    Total Protein 8.4 (H) 6.3 - 8.2 g/dL    Albumin 4.4 3.5 - 5.0 g/dL    Globulin 4.0 2.8 - 4.5 g/dL    Albumin/Globulin Ratio 1.1 0.4 - 1.6     Lipase   Result Value Ref Range    Lipase 91 73 - 393 U/L        CT ABDOMEN PELVIS RENAL STONE   Final Result   1. Tiny 1 mm stone dependently in the bladder lumen adjacent to the left UVJ in   keeping with a recently passed stone. 2.  Bilateral nonobstructing parapelvic renal stones. 3.  No hydronephrosis or hydroureter. 4.  Constipation suspected                               Voice dictation software was used during the making of this note. This software is not perfect and grammatical and other typographical errors may be present. This note has not been completely proofread for errors.      Howard Gtz MD  12/04/22 3427 [address], [place]  12/04/22 7127

## 2022-12-09 ENCOUNTER — OFFICE VISIT (OUTPATIENT)
Dept: UROLOGY | Age: 57
End: 2022-12-09

## 2022-12-09 ENCOUNTER — TELEPHONE (OUTPATIENT)
Dept: UROLOGY | Age: 57
End: 2022-12-09

## 2022-12-09 DIAGNOSIS — N20.0 NEPHROLITHIASIS: Primary | ICD-10-CM

## 2022-12-09 LAB
BILIRUBIN, URINE, POC: NEGATIVE
BLOOD URINE, POC: NORMAL
GLUCOSE URINE, POC: 500
KETONES, URINE, POC: NEGATIVE
LEUKOCYTE ESTERASE, URINE, POC: NEGATIVE
NITRITE, URINE, POC: NEGATIVE
PH, URINE, POC: 6 (ref 4.6–8)
PROTEIN,URINE, POC: NEGATIVE
SPECIFIC GRAVITY, URINE, POC: 1.02 (ref 1–1.03)
URINALYSIS CLARITY, POC: NORMAL
URINALYSIS COLOR, POC: NORMAL
UROBILINOGEN, POC: NORMAL

## 2022-12-09 RX ORDER — ONDANSETRON HYDROCHLORIDE 8 MG/1
8 TABLET, FILM COATED ORAL EVERY 8 HOURS PRN
Qty: 15 TABLET | Refills: 0 | Status: SHIPPED | OUTPATIENT
Start: 2022-12-09

## 2022-12-09 RX ORDER — OXYCODONE HYDROCHLORIDE AND ACETAMINOPHEN 5; 325 MG/1; MG/1
1 TABLET ORAL EVERY 6 HOURS PRN
Qty: 12 TABLET | Refills: 0 | Status: SHIPPED | OUTPATIENT
Start: 2022-12-09 | End: 2022-12-12

## 2022-12-09 RX ORDER — CIPROFLOXACIN 2 MG/ML
400 INJECTION, SOLUTION INTRAVENOUS
OUTPATIENT
Start: 2022-12-09 | End: 2022-12-09

## 2022-12-09 ASSESSMENT — ENCOUNTER SYMPTOMS: NAUSEA: 0

## 2022-12-09 NOTE — TELEPHONE ENCOUNTER
Patient will need to be scheduled for left ESWL for a 19 mm x 15 mm left renal stone. Orders have been complete. He is going to check with insurance to see when it starts over as he has not met her deductible this year. He may want this after the first of the year.     Thank you,  John Martinez

## 2022-12-09 NOTE — PROGRESS NOTES
[facility]  [address] [facility] [address], [address] [facility]  [phone]          Daisha Jones  : 1965    Chief Complaint   Patient presents with    Follow-up          HPI     Daisha Jones is a 62 y.o. male here today for ER follow-up due to abrupt onset leftt flank pain. Patient reports he was seen in the ER 2022 due to this pain. He has a long history of stone disease. He reports at the age of 21 he had his first stone. He was hospitalized at that time and sounds like he had to undergo ureteroscopy to retrieve that stone. He did not have any more stones into the age of 36 and since the age of 36 he has had at least 1 or 2 stones a year. CT scan was obtained in the ER. CT results as below    CT Impression  1. Tiny 1 mm stone dependently in the bladder lumen adjacent to the left UVJ in   keeping with a recently passed stone. 2.  Bilateral nonobstructing parapelvic renal stones. 3.  No hydronephrosis or hydroureter. 4.  Constipation suspected         He is unsure as to whether or not he passed a small stone but he has not had any pain since his ER visit. He has not had any fever chills or gross blood. Here now for evaluation and to establish care. He understands that there is a large stone that we will probably need to be treated and that he could not pass on his own.     Past Medical History:   Diagnosis Date    Diabetes ([address]     type 2- oral agents- does not check glucose- last A1C 5.7- 2019- denies hypoglycemia    Generalized osteoarthrosis, unspecified site 2013    knees    GERD (gastroesophageal reflux disease)     resolves with omeprazole prn--    Hepatomegaly 2013    History of athlete's foot     History of cerebral venous sinus thrombosis associated with congenital heart disease     Pt denies any knowledge of this dx 19- Pt denies ever having this dx or symptoms associated with this dx    History of kidney stones     Hodgkin lymphoma ([facility] [address]     dx 2001- no recurrence- chemo and stem cell     Hyperlipidemia     Hypertension     OA (osteoarthritis)     Obstructive sleep apnea (adult) (pediatric) 9/7/2013    uses CPAP    Prediabetes      Past Surgical History:   Procedure Laterality Date    CATARACT REMOVAL Bilateral 01/2022    COLONOSCOPY N/A 12/11/2017    COLONOSCOPY / BMI=31 performed by Althea Parra MD at [address] [facility]  12/11/2017         LITHOTRIPSY      LYMPH NODE DISSECTION      OTHER SURGICAL HISTORY      Biopsy    SINUS SURGERY PROC UNLISTED      VASCULAR SURGERY      port insertion and removal - chemo     Current Outpatient Medications   Medication Sig Dispense Refill    ondansetron (ZOFRAN) 8 MG tablet Take 1 tablet by mouth every 8 hours as needed for Nausea or Vomiting 15 tablet 0    oxyCODONE-acetaminophen (PERCOCET) 5-325 MG per tablet Take 1 tablet by mouth every 6 hours as needed for Pain for up to 3 days. 12 tablet 0    predniSONE (DELTASONE) 50 MG tablet Take 1 tablet by mouth daily for 5 days 5 tablet 0    amitriptyline (ELAVIL) 50 MG tablet Take 1 tablet by mouth nightly For sleep/neuropathy 90 tablet 3    atenolol (TENORMIN) 25 MG tablet Take 1 tablet by mouth in the morning. For blood pressure. 90 tablet 2    atorvastatin (LIPITOR) 80 MG tablet Take 1 tablet by mouth in the morning. For cholesterol. 90 tablet 3    celecoxib (CELEBREX) 200 MG capsule Take 1 capsule by mouth in the morning and 1 capsule before bedtime. With food for pain. 180 capsule 3    Dapagliflozin-metFORMIN HCl ER (XIGDUO XR) 5-1000 MG TB24 Take 1 tablet by mouth daily With food for diabetes 90 tablet 3    fenofibrate (TRICOR) 145 MG tablet Take 1 tablet by mouth in the morning. For cholesterol. 90 tablet 3    omeprazole (PRILOSEC) 40 MG delayed release capsule Take 1 capsule by mouth in the morning. On empty stomach and wait at least 30 minutes before eating daily for stomach reflux.  90 capsule 3    clotrimazole-betamethasone (LOTRISONE) 1-0.05 % cream Apply topically 2 times daily For fungal treatment (athlete's foot) 45 g 5    triamcinolone (ARISTOCORT) 0.5 % cream Apply topically 2 times daily For itching/poison ivy/oak rash 60 g 2    nystatin (MYCOSTATIN) 367963 UNIT/ML suspension Take 5 mLs by mouth in the morning and 5 mLs at noon and 5 mLs in the evening and 5 mLs before bedtime. Swish and spit for tongue sensitivity/thrush. 473 mL 2    CPAP Machine MISC 15 cm every night      aspirin 81 MG EC tablet Take 81 mg by mouth daily      Cholecalciferol 50 MCG (2000 UT) TABS Take 2,000 Units by mouth daily       No current facility-administered medications for this visit. Allergies   Allergen Reactions    Penicillins Rash    Sulfa Antibiotics Rash     Social History     Socioeconomic History    Marital status:      Spouse name: Not on file    Number of children: Not on file    Years of education: Not on file    Highest education level: Not on file   Occupational History    Not on file   Tobacco Use    Smoking status: Never    Smokeless tobacco: Never   Substance and Sexual Activity    Alcohol use: No    Drug use: No    Sexual activity: Not on file   Other Topics Concern    Not on file   Social History Narrative    Not on file     Social Determinants of Health     Financial Resource Strain: Not on file   Food Insecurity: Not on file   Transportation Needs: Not on file   Physical Activity: Not on file   Stress: Not on file   Social Connections: Not on file   Intimate Partner Violence: Not on file   Housing Stability: Not on file     Family History   Problem Relation Age of Onset    Heart Attack Mother 61        MI    Elevated Lipids Mother     Hypertension Mother     Heart Disease Mother     No Known Problems Father     No Known Problems Sister     Mult Sclerosis Sister     Cancer Sister        Review of Systems  Constitutional:   Negative for fever. GI:  Negative for nausea.   Genitourinary: Negative for flank pain. Urinalysis  UA - Dipstick  Results for orders placed or performed in visit on 12/09/22   AMB POC URINALYSIS DIP STICK AUTO W/O MICRO   Result Value Ref Range    Color (UA POC)      Clarity (UA POC)      Glucose, Urine,  Negative    Bilirubin, Urine, POC Negative Negative    KETONES, Urine, POC Negative Negative    Specific Gravity, Urine, POC 1.020 1.001 - 1.035    Blood (UA POC) Small Negative    pH, Urine, POC 6.0 4.6 - 8.0    Protein, Urine, POC Negative Negative    Urobilinogen, POC 0.2 mg/dL     Nitrite, Urine, POC Negative Negative    Leukocyte Esterase, Urine, POC Negative Negative       UA - Micro  WBC - 0  RBC - 0  Bacteria - 0  Epith - 0    PHYSICAL EXAM    GENERAL: No acute distress, Awake, Alert, Oriented X 3, Gait normal  ABDOMEN: soft, non tender, non-distended, positive bowel sounds, no organomegaly, no palpable masses, no guarding, no rebound tenderness  SKIN: No rash, no erythema, no lacerations or abrasions, no ecchymosis  MUSCULOSKELETAL - MAEW, no edema     KUB: Normal gas pattern is present. Within the right kidney there are 2 to 3 stones in the lower pole with the largest measuring about 3 mm. In the left kidney there are at least 4 stones the largest measuring 15 mm x 19 mm in the mid to upper pole in the lower pole of that kidney there are at least 3 stones with the largest measuring about 4 mm. There is no ureteral stones or bladder stones. Assessment and Plan    ICD-10-CM    1. Nephrolithiasis  N20.0 AMB POC URINALYSIS DIP STICK AUTO W/O MICRO     XR ABD (KUB) (78707) - POC for [place] ONLY     oxyCODONE-acetaminophen (PERCOCET) 5-325 MG per tablet        Stone disease was discussed in great detail today. I do feel patient would benefit from -[facility] metabolic work-up. He would like to proceed with intervention of the stones. We discussed ureteroscopy versus ESWL versus PCNL. He would like to try ESWL if possible.   He is going to check with his insurance to see when it starts over. He would like to wait to the first the year since a deductible has not been made. PLAN:  We will schedule him for left ESWL. We will treat the large stone in the upper pole. He understands it may take multiple treatments and he also understands if any of the pieces lodged in the ureter he may require ureteroscopy. I will send him in a prescription for pain and nausea medication to keep on hand. Surgery scheduler will call to arrange. Treatment options with risk and benefits were discussed with patient in detail. Treatment options discussed including watchful waiting in hopes that the stone will pass, ESWL and ureteroscopy with laser lithotripsy. Plan is for left ESWL. Risk include but are not limited to bleeding, infection, renal injury requiring repair, risk of anesthesia and risk of incomplete fragmentation of the stone requiring further treatments such as ureteroscopy or repeat ESWL. Viktoria Gilford, APRN - NP Dr. Wyvonna Jude is supervising physician today and he approves plan of care. Return for surgery scheduler will call. Elements of this note have been dictated using speech recognition software. Although reviewed, errors of speech recognition may have occurred. Viktoria Gilford, APRN - NP Dr. Wyvonna Jude is supervising physician today and he approves plan of care. Return for surgery scheduler will call. Elements of this note have been dictated using speech recognition software. Although reviewed, errors of speech recognition may have occurred.

## 2022-12-12 ENCOUNTER — TELEPHONE (OUTPATIENT)
Dept: UROLOGY | Age: 57
End: 2022-12-12

## 2022-12-12 NOTE — TELEPHONE ENCOUNTER
Orders were sent on behalf of the patient to have surgery. Insurance has been cleared for start date of  01/01/2023.

## 2022-12-13 PROBLEM — N20.0 KIDNEY STONE: Status: ACTIVE | Noted: 2022-12-13

## 2022-12-13 NOTE — TELEPHONE ENCOUNTER
Procedures: Procedure(s):   ESWL EXTRACORPOREAL SHOCK WAVE LITHOTRIPSY, LEFT   Date: 1/5/2023   Time: 1301   Location: [facility] MAIN OR 02     Scheduled.

## 2022-12-29 NOTE — PERIOP NOTE
Patient verified name and . Order for consent  found in EHR and matches case posting; patient verifies procedure. Type 1B surgery, phone assessment complete. Orders  received. Labs per surgeon: CBC with diff, UA s/h- patient request the DOS due to work  Labs per anesthesia protocol: SQBS, K+ s/h for DOS    Patient answered medical/surgical history questions at their best of ability. All prior to admission medications documented in [facility]. Patient instructed to take the following medications the day of surgery according to anesthesia guidelines with a small sip of water: prilosec if needed. On the day before surgery please take Acetaminophen 1000mg in the morning and then again before bed. You may substitute for Tylenol 650 mg. Hold all vitamins 7 days prior to surgery and NSAIDS 5 days prior to surgery. Prescription meds to hold: celebrex hold 5 days prior to surgery    Patient instructed on the following:    > Arrive at main Entrance, time of arrival to be called the day before by 1700  > NPO after midnight, unless otherwise indicated, including gum, mints, and ice chips  > Responsible adult must drive patient to the hospital, stay during surgery, and patient will need supervision 24 hours after anesthesia  > Use antibacterial soap in shower the night before surgery and on the morning of surgery  > All piercings must be removed prior to arrival.    > Leave all valuables (money and jewelry) at home but bring insurance card and ID on DOS.   > You may be required to pay a deductible or co-pay on the day of your procedure. You can pre-pay by calling [phone] if your surgery is at the [facility] or [phone] if your surgery is at the [facility]. > Do not wear make-up, nail polish, lotions, cologne, perfumes, powders, or oil on skin.      Patient verbalized understanding

## 2023-01-04 RX ORDER — OXYCODONE HYDROCHLORIDE 5 MG/1
5 TABLET ORAL
Status: CANCELLED | OUTPATIENT
Start: 2023-01-04 | End: 2023-01-05

## 2023-01-04 RX ORDER — IPRATROPIUM BROMIDE AND ALBUTEROL SULFATE 2.5; .5 MG/3ML; MG/3ML
1 SOLUTION RESPIRATORY (INHALATION)
Status: CANCELLED | OUTPATIENT
Start: 2023-01-04 | End: 2023-01-05

## 2023-01-04 RX ORDER — HYDROMORPHONE HCL 110MG/55ML
0.5 PATIENT CONTROLLED ANALGESIA SYRINGE INTRAVENOUS EVERY 5 MIN PRN
Status: CANCELLED | OUTPATIENT
Start: 2023-01-04

## 2023-01-04 RX ORDER — HALOPERIDOL 5 MG/ML
1 INJECTION INTRAMUSCULAR
Status: CANCELLED | OUTPATIENT
Start: 2023-01-04 | End: 2023-01-05

## 2023-01-04 RX ORDER — PROCHLORPERAZINE EDISYLATE 5 MG/ML
5 INJECTION INTRAMUSCULAR; INTRAVENOUS
Status: CANCELLED | OUTPATIENT
Start: 2023-01-04 | End: 2023-01-05

## 2023-01-05 ENCOUNTER — ANESTHESIA (OUTPATIENT)
Dept: SURGERY | Age: 58
End: 2023-01-05
Payer: COMMERCIAL

## 2023-01-05 ENCOUNTER — HOSPITAL ENCOUNTER (OUTPATIENT)
Age: 58
Setting detail: OUTPATIENT SURGERY
Discharge: HOME | End: 2023-01-05
Attending: UROLOGY | Admitting: UROLOGY
Payer: COMMERCIAL

## 2023-01-05 ENCOUNTER — ANESTHESIA EVENT (OUTPATIENT)
Dept: SURGERY | Age: 58
End: 2023-01-05
Payer: COMMERCIAL

## 2023-01-05 VITALS
WEIGHT: 230 LBS | HEART RATE: 70 BPM | DIASTOLIC BLOOD PRESSURE: 88 MMHG | HEIGHT: 70 IN | OXYGEN SATURATION: 96 % | SYSTOLIC BLOOD PRESSURE: 146 MMHG | TEMPERATURE: 97.5 F | RESPIRATION RATE: 14 BRPM | BODY MASS INDEX: 32.93 KG/M2

## 2023-01-05 DIAGNOSIS — C81.10 NODULAR SCLEROSIS HODGKIN LYMPHOMA, UNSPECIFIED SITE (HCC): Primary | ICD-10-CM

## 2023-01-05 DIAGNOSIS — N20.0 KIDNEY STONE: ICD-10-CM

## 2023-01-05 LAB
APPEARANCE UR: CLEAR
BASOPHILS # BLD: 0.1 K/UL (ref 0–0.2)
BASOPHILS NFR BLD: 1 % (ref 0–2)
BILIRUB UR QL: NEGATIVE
COLOR UR: ABNORMAL
DIFFERENTIAL METHOD BLD: ABNORMAL
EOSINOPHIL # BLD: 0.3 K/UL (ref 0–0.8)
EOSINOPHIL NFR BLD: 4 % (ref 0.5–7.8)
ERYTHROCYTE [DISTWIDTH] IN BLOOD BY AUTOMATED COUNT: 13.3 % (ref 11.9–14.6)
GLUCOSE BLD STRIP.AUTO-MCNC: 119 MG/DL (ref 65–100)
GLUCOSE UR STRIP.AUTO-MCNC: >1000 MG/DL
HCT VFR BLD AUTO: 50.6 % (ref 41.1–50.3)
HGB BLD-MCNC: 16.9 G/DL (ref 13.6–17.2)
HGB UR QL STRIP: NEGATIVE
IMM GRANULOCYTES # BLD AUTO: 0 K/UL (ref 0–0.5)
IMM GRANULOCYTES NFR BLD AUTO: 0 % (ref 0–5)
KETONES UR QL STRIP.AUTO: NEGATIVE MG/DL
LEUKOCYTE ESTERASE UR QL STRIP.AUTO: NEGATIVE
LYMPHOCYTES # BLD: 2.9 K/UL (ref 0.5–4.6)
LYMPHOCYTES NFR BLD: 37 % (ref 13–44)
MCH RBC QN AUTO: 30 PG (ref 26.1–32.9)
MCHC RBC AUTO-ENTMCNC: 33.4 G/DL (ref 31.4–35)
MCV RBC AUTO: 89.9 FL (ref 82–102)
MONOCYTES # BLD: 0.6 K/UL (ref 0.1–1.3)
MONOCYTES NFR BLD: 8 % (ref 4–12)
NEUTS SEG # BLD: 4 K/UL (ref 1.7–8.2)
NEUTS SEG NFR BLD: 50 % (ref 43–78)
NITRITE UR QL STRIP.AUTO: NEGATIVE
NRBC # BLD: 0 K/UL (ref 0–0.2)
PH UR STRIP: 5 [PH] (ref 5–9)
PLATELET # BLD AUTO: 312 K/UL (ref 150–450)
PMV BLD AUTO: 9.6 FL (ref 9.4–12.3)
POTASSIUM BLD-SCNC: 4.1 MMOL/L (ref 3.5–5.1)
POTASSIUM SERPL-SCNC: 4.2 MMOL/L (ref 3.5–5.1)
PROT UR STRIP-MCNC: NEGATIVE MG/DL
RBC # BLD AUTO: 5.63 M/UL (ref 4.23–5.6)
SERVICE CMNT-IMP: ABNORMAL
SP GR UR REFRACTOMETRY: 1.03 (ref 1–1.02)
UROBILINOGEN UR QL STRIP.AUTO: 0.2 EU/DL (ref 0.2–1)
WBC # BLD AUTO: 7.9 K/UL (ref 4.3–11.1)

## 2023-01-05 PROCEDURE — 6370000000 HC RX 637 (ALT 250 FOR IP): Performed by: ANESTHESIOLOGY

## 2023-01-05 PROCEDURE — 84132 ASSAY OF SERUM POTASSIUM: CPT

## 2023-01-05 PROCEDURE — 82962 GLUCOSE BLOOD TEST: CPT

## 2023-01-05 PROCEDURE — 7100000001 HC PACU RECOVERY - ADDTL 15 MIN: Performed by: UROLOGY

## 2023-01-05 PROCEDURE — 2580000003 HC RX 258: Performed by: ANESTHESIOLOGY

## 2023-01-05 PROCEDURE — 6360000002 HC RX W HCPCS: Performed by: NURSE ANESTHETIST, CERTIFIED REGISTERED

## 2023-01-05 PROCEDURE — 2500000003 HC RX 250 WO HCPCS: Performed by: NURSE ANESTHETIST, CERTIFIED REGISTERED

## 2023-01-05 PROCEDURE — 3600000015 HC SURGERY LEVEL 5 ADDTL 15MIN: Performed by: UROLOGY

## 2023-01-05 PROCEDURE — 2709999900 HC NON-CHARGEABLE SUPPLY: Performed by: UROLOGY

## 2023-01-05 PROCEDURE — 7100000010 HC PHASE II RECOVERY - FIRST 15 MIN: Performed by: UROLOGY

## 2023-01-05 PROCEDURE — 3700000001 HC ADD 15 MINUTES (ANESTHESIA): Performed by: UROLOGY

## 2023-01-05 PROCEDURE — 3600000005 HC SURGERY LEVEL 5 BASE: Performed by: UROLOGY

## 2023-01-05 PROCEDURE — 81003 URINALYSIS AUTO W/O SCOPE: CPT

## 2023-01-05 PROCEDURE — 3700000000 HC ANESTHESIA ATTENDED CARE: Performed by: UROLOGY

## 2023-01-05 PROCEDURE — 6360000002 HC RX W HCPCS: Performed by: NURSE PRACTITIONER

## 2023-01-05 PROCEDURE — 7100000000 HC PACU RECOVERY - FIRST 15 MIN: Performed by: UROLOGY

## 2023-01-05 PROCEDURE — 85025 COMPLETE CBC W/AUTO DIFF WBC: CPT

## 2023-01-05 RX ORDER — HYDROCODONE BITARTRATE AND ACETAMINOPHEN 5; 325 MG/1; MG/1
1 TABLET ORAL EVERY 6 HOURS PRN
Qty: 12 TABLET | Refills: 0 | Status: SHIPPED | OUTPATIENT
Start: 2023-01-05 | End: 2023-01-08

## 2023-01-05 RX ORDER — PROPOFOL 10 MG/ML
INJECTION, EMULSION INTRAVENOUS PRN
Status: DISCONTINUED | OUTPATIENT
Start: 2023-01-05 | End: 2023-01-05 | Stop reason: SDUPTHER

## 2023-01-05 RX ORDER — DEXAMETHASONE SODIUM PHOSPHATE 4 MG/ML
INJECTION, SOLUTION INTRA-ARTICULAR; INTRALESIONAL; INTRAMUSCULAR; INTRAVENOUS; SOFT TISSUE PRN
Status: DISCONTINUED | OUTPATIENT
Start: 2023-01-05 | End: 2023-01-05 | Stop reason: SDUPTHER

## 2023-01-05 RX ORDER — SODIUM CHLORIDE, SODIUM LACTATE, POTASSIUM CHLORIDE, CALCIUM CHLORIDE 600; 310; 30; 20 MG/100ML; MG/100ML; MG/100ML; MG/100ML
INJECTION, SOLUTION INTRAVENOUS CONTINUOUS
Status: DISCONTINUED | OUTPATIENT
Start: 2023-01-05 | End: 2023-01-05 | Stop reason: HOSPADM

## 2023-01-05 RX ORDER — ACETAMINOPHEN 500 MG
1000 TABLET ORAL ONCE
Status: COMPLETED | OUTPATIENT
Start: 2023-01-05 | End: 2023-01-05

## 2023-01-05 RX ORDER — SODIUM CHLORIDE 9 MG/ML
INJECTION, SOLUTION INTRAVENOUS PRN
Status: DISCONTINUED | OUTPATIENT
Start: 2023-01-05 | End: 2023-01-05 | Stop reason: HOSPADM

## 2023-01-05 RX ORDER — LIDOCAINE HYDROCHLORIDE 20 MG/ML
INJECTION, SOLUTION EPIDURAL; INFILTRATION; INTRACAUDAL; PERINEURAL PRN
Status: DISCONTINUED | OUTPATIENT
Start: 2023-01-05 | End: 2023-01-05 | Stop reason: SDUPTHER

## 2023-01-05 RX ORDER — LIDOCAINE HYDROCHLORIDE 10 MG/ML
1 INJECTION, SOLUTION INFILTRATION; PERINEURAL
Status: DISCONTINUED | OUTPATIENT
Start: 2023-01-05 | End: 2023-01-05 | Stop reason: HOSPADM

## 2023-01-05 RX ORDER — ONDANSETRON 2 MG/ML
INJECTION INTRAMUSCULAR; INTRAVENOUS PRN
Status: DISCONTINUED | OUTPATIENT
Start: 2023-01-05 | End: 2023-01-05 | Stop reason: SDUPTHER

## 2023-01-05 RX ORDER — MIDAZOLAM HYDROCHLORIDE 1 MG/ML
INJECTION INTRAMUSCULAR; INTRAVENOUS PRN
Status: DISCONTINUED | OUTPATIENT
Start: 2023-01-05 | End: 2023-01-05 | Stop reason: SDUPTHER

## 2023-01-05 RX ORDER — CIPROFLOXACIN 2 MG/ML
400 INJECTION, SOLUTION INTRAVENOUS
Status: COMPLETED | OUTPATIENT
Start: 2023-01-05 | End: 2023-01-05

## 2023-01-05 RX ORDER — MIDAZOLAM HYDROCHLORIDE 2 MG/2ML
2 INJECTION, SOLUTION INTRAMUSCULAR; INTRAVENOUS
Status: DISCONTINUED | OUTPATIENT
Start: 2023-01-05 | End: 2023-01-05 | Stop reason: HOSPADM

## 2023-01-05 RX ORDER — SODIUM CHLORIDE 0.9 % (FLUSH) 0.9 %
5-40 SYRINGE (ML) INJECTION EVERY 12 HOURS SCHEDULED
Status: DISCONTINUED | OUTPATIENT
Start: 2023-01-05 | End: 2023-01-05 | Stop reason: HOSPADM

## 2023-01-05 RX ORDER — SODIUM CHLORIDE 0.9 % (FLUSH) 0.9 %
5-40 SYRINGE (ML) INJECTION PRN
Status: DISCONTINUED | OUTPATIENT
Start: 2023-01-05 | End: 2023-01-05 | Stop reason: HOSPADM

## 2023-01-05 RX ORDER — FENTANYL CITRATE 50 UG/ML
100 INJECTION, SOLUTION INTRAMUSCULAR; INTRAVENOUS
Status: DISCONTINUED | OUTPATIENT
Start: 2023-01-05 | End: 2023-01-05 | Stop reason: HOSPADM

## 2023-01-05 RX ADMIN — MIDAZOLAM 2 MG: 1 INJECTION INTRAMUSCULAR; INTRAVENOUS at 08:06

## 2023-01-05 RX ADMIN — DEXAMETHASONE SODIUM PHOSPHATE 4 MG: 4 INJECTION, SOLUTION INTRAMUSCULAR; INTRAVENOUS at 08:20

## 2023-01-05 RX ADMIN — LIDOCAINE HYDROCHLORIDE 100 MG: 20 INJECTION, SOLUTION EPIDURAL; INFILTRATION; INTRACAUDAL; PERINEURAL at 08:08

## 2023-01-05 RX ADMIN — PROPOFOL 200 MG: 10 INJECTION, EMULSION INTRAVENOUS at 08:09

## 2023-01-05 RX ADMIN — SODIUM CHLORIDE, SODIUM LACTATE, POTASSIUM CHLORIDE, AND CALCIUM CHLORIDE: 600; 310; 30; 20 INJECTION, SOLUTION INTRAVENOUS at 07:58

## 2023-01-05 RX ADMIN — ACETAMINOPHEN 1000 MG: 500 TABLET, FILM COATED ORAL at 06:52

## 2023-01-05 RX ADMIN — CIPROFLOXACIN 400 MG: 2 INJECTION, SOLUTION INTRAVENOUS at 08:15

## 2023-01-05 RX ADMIN — PROPOFOL 200 MG: 10 INJECTION, EMULSION INTRAVENOUS at 08:08

## 2023-01-05 RX ADMIN — SODIUM CHLORIDE, SODIUM LACTATE, POTASSIUM CHLORIDE, AND CALCIUM CHLORIDE: 600; 310; 30; 20 INJECTION, SOLUTION INTRAVENOUS at 06:53

## 2023-01-05 RX ADMIN — ONDANSETRON 4 MG: 2 INJECTION INTRAMUSCULAR; INTRAVENOUS at 08:20

## 2023-01-05 ASSESSMENT — PAIN - FUNCTIONAL ASSESSMENT
PAIN_FUNCTIONAL_ASSESSMENT: NONE - DENIES PAIN
PAIN_FUNCTIONAL_ASSESSMENT: NONE - DENIES PAIN
PAIN_FUNCTIONAL_ASSESSMENT: 0-10

## 2023-01-05 NOTE — BRIEF OP NOTE
Brief Postoperative Note      Patient: Wilmar Somers  YOB: 1965  MRN: [id number]    Date of Procedure: 1/5/2023    Pre-Op Diagnosis: Kidney stone [N20.0]    Post-Op Diagnosis: Same       Procedure(s):  ESWL EXTRACORPOREAL SHOCK WAVE LITHOTRIPSY, LEFT    Surgeon(s):  Barak Ross MD    Assistant:  * No surgical staff found *    Anesthesia: General    Estimated Blood Loss (mL): Minimal    Complications: None    Specimens:   * No specimens in log *    Implants:  * No implants in log *      Drains: * No LDAs found *    Findings: see op note    Electronically signed by Mike Goldstein MD on 1/5/2023 at 9:03 AM

## 2023-01-05 NOTE — ANESTHESIA PROCEDURE NOTES
Airway  Date/Time: 1/5/2023 8:22 AM  Urgency: elective    Airway not difficult    General Information and Staff    Patient location during procedure: OR  Anesthesiologist: Quin Caldwell MD  Resident/CRNA: ALYSA Gonzalez CRNA  Performed: resident/CRNA     Indications and Patient Condition  Indications for airway management: anesthesia  Spontaneous Ventilation: absent  Sedation level: deep  Preoxygenated: yes  Patient position: sniffing  Mask difficulty assessment: vent by bag mask    Final Airway Details  Final airway type: supraglottic airway      Successful airway: oropharyngeal  Size 4     Number of attempts at approach: 1  Ventilation between attempts: supraglottic airway  Number of other approaches attempted: 0

## 2023-01-05 NOTE — OR NURSING
Preoperative flank skin condition: warm, dry intact  Lead shield: No - interferes with equipment  Patient ear protection: Yes   Gel applied to patient's flank and Lithotripsy head: Yes   Starting power level: 7  Shock start time (first  fluoroscopy): 0821  Shock stop time (last lithotripsy shock): 0857  Ending power level: 11  Total shocks: 3000  Total fluoroscopy time: 2.12  Postoperative flank skin condition: warm, dry, intact, red

## 2023-01-05 NOTE — ANESTHESIA PRE PROCEDURE
Department of Anesthesiology  Preprocedure Note       Name:  Prashant Rockwell   Age:  62 y.o.  :  1965                                          MRN:  [id number]         Date:  2023      Surgeon: Ti Orozco):  Cici Batres MD    Procedure: Procedure(s):  ESWL EXTRACORPOREAL SHOCK WAVE LITHOTRIPSY, LEFT    Medications prior to admission:   Prior to Admission medications    Medication Sig Start Date End Date Taking? Authorizing Provider   ondansetron (ZOFRAN) 8 MG tablet Take 1 tablet by mouth every 8 hours as needed for Nausea or Vomiting 22   ALYSA Tena NP   amitriptyline (ELAVIL) 50 MG tablet Take 1 tablet by mouth nightly For sleep/neuropathy 22   ALYSA Cuellar - CNP   atenolol (TENORMIN) 25 MG tablet Take 1 tablet by mouth in the morning. For blood pressure. Patient taking differently: Take 25 mg by mouth at bedtime For blood pressure 22   ALYSA Cuellar CNP   atorvastatin (LIPITOR) 80 MG tablet Take 1 tablet by mouth in the morning. For cholesterol. Patient taking differently: Take 80 mg by mouth nightly For cholesterol 22   Lola Ballesteros APRN - CNP   celecoxib (CELEBREX) 200 MG capsule Take 1 capsule by mouth in the morning and 1 capsule before bedtime. With food for pain. 22   Shima Jiang Rasp, APRN - CNP   Dapagliflozin-metFORMIN HCl ER (XIGDUO XR) 5-1000 MG TB24 Take 1 tablet by mouth daily With food for diabetes  Patient taking differently: Take 1 tablet by mouth nightly With food for diabetes 22   Lola Ballesteros APRN - CNP   fenofibrate (TRICOR) 145 MG tablet Take 1 tablet by mouth in the morning. For cholesterol. Patient taking differently: Take 145 mg by mouth nightly For cholesterol 22   ALYSA Cuellar - CNP   omeprazole (PRILOSEC) 40 MG delayed release capsule Take 1 capsule by mouth in the morning. On empty stomach and wait at least 30 minutes before eating daily for stomach reflux.   Patient taking differently: Take 40 mg by mouth daily as needed On empty stomach and wait at least 30 minutes before eating daily for stomach reflux 7/25/22   ALYSA Vidal CNP   clotrimazole-betamethasone (LOTRISONE) 1-0.05 % cream Apply topically 2 times daily For fungal treatment (athlete's foot)  Patient taking differently: Apply topically 2 times daily as needed For fungal treatment (athlete's foot) 7/25/22   ALYSA Snow CNP   triamcinolone (ARISTOCORT) 0.5 % cream Apply topically 2 times daily For itching/poison ivy/oak rash  Patient taking differently: Apply topically 2 times daily as needed For itching/poison ivy/oak rash 7/25/22   ALYSA Vidal CNP   nystatin (MYCOSTATIN) 022582 UNIT/ML suspension Take 5 mLs by mouth in the morning and 5 mLs at noon and 5 mLs in the evening and 5 mLs before bedtime. Swish and spit for tongue sensitivity/thrush.   Patient taking differently: Take 500,000 Units by mouth 4 times daily as needed Swish and spit for tongue sensitivity/thrush 7/25/22   ALYSA Vidal CNP   CPAP Machine MISC 15 cm every night    Historical Provider, MD   aspirin 81 MG EC tablet Take 81 mg by mouth nightly 4/25/18   Ar Automatic Reconciliation       Current medications:    Current Facility-Administered Medications   Medication Dose Route Frequency Provider Last Rate Last Admin    lidocaine 1 % injection 1 mL  1 mL IntraDERmal Once PRN Anshul Gomez MD        fentaNYL (SUBLIMAZE) injection 100 mcg  100 mcg IntraVENous Once PRN Anshul Gomez MD        lactated ringers infusion   IntraVENous Continuous Anshul Gomez  mL/hr at 01/05/23 0653 New Bag at 01/05/23 0653    sodium chloride flush 0.9 % injection 5-40 mL  5-40 mL IntraVENous 2 times per day Anshul Gomez MD        sodium chloride flush 0.9 % injection 5-40 mL  5-40 mL IntraVENous PRN Anshul Gomez MD        0.9 % sodium chloride infusion   IntraVENous PRN Anshul Gomez MD        midazolam PF (VERSED) injection 2 mg  2 mg IntraVENous Once PRN Romain Lobato MD        ciprofloxacin (CIPRO) IVPB 400 mg  400 mg IntraVENous On Call to [address] [place], APRN - NP   Held at 01/05/23 9279       Allergies:     Allergies   Allergen Reactions    Penicillins Rash    Sulfa Antibiotics Rash       Problem List:    Patient Active Problem List   Diagnosis Code    Hyperlipemia E78.5    Hepatomegaly R16.0    Arthritis M19.90    GERD (gastroesophageal reflux disease) K21.9    Chronic kidney disease N18.9    Essential hypertension, benign I10    Reflux esophagitis K21.00    Severe obesity ([address] E66.01    Type 2 diabetes mellitus without complication, without long-term current use of insulin ([address] E11.9    KATERNIA on CPAP G47.33, Z99.89    Kidney stone N20.0       Past Medical History:        Diagnosis Date    Diabetes ([address]     type 2- oral agents- does not check glucose; denies hypoglycemia; A1c 6.3 6/17/22    Generalized osteoarthrosis, unspecified site 9/7/2013    knees    GERD (gastroesophageal reflux disease)     prn meds    Hepatomegaly 9/7/2013    History of athlete's foot     History of cerebral venous sinus thrombosis associated with congenital heart disease     Pt denies any knowledge of this dx 2019- Pt denies ever having this dx    History of kidney stones     Hodgkin lymphoma ([address]     dx 2001- no recurrence- chemo and stem cell     Hyperlipidemia     Hypertension     medication    OA (osteoarthritis)     knees    Obstructive sleep apnea (adult) (pediatric) 9/7/2013    uses CPAP    Prediabetes        Past Surgical History:        Procedure Laterality Date    CATARACT REMOVAL Bilateral 01/2022    COLONOSCOPY N/A 12/11/2017    COLONOSCOPY / BMI=31 performed by Kirill Giles [facility] at [facility]  12/11/2017         LITHOTRIPSY      LYMPH NODE DISSECTION      OTHER SURGICAL HISTORY      Biopsy    SINUS SURGERY PROC UNLISTED      VASCULAR SURGERY      port insertion and removal - chemo       Social History:    Social History     Tobacco Use    Smoking status: Never    Smokeless tobacco: Never   Substance Use Topics    Alcohol use: No                                Counseling given: Not Answered      Vital Signs (Current):   Vitals:    12/29/22 0851 01/05/23 0647   BP:  (!) 151/86   Pulse:  61   Resp:  18   Temp:  98 °F (36.7 °C)   TempSrc:  Temporal   SpO2:  99%   Weight: 230 lb (104.3 kg) 230 lb (104.3 kg)   Height: 5' 10\" (1.778 m) 5' 10\" (1.778 m)                                              BP Readings from Last 3 Encounters:   01/05/23 (!) 151/86   12/04/22 (!) 143/100   07/25/22 134/78       NPO Status: Time of last liquid consumption: 0000                        Time of last solid consumption: 0000                        Date of last liquid consumption: 01/04/23                        Date of last solid food consumption: 01/04/23    BMI:   Wt Readings from Last 3 Encounters:   01/05/23 230 lb (104.3 kg)   12/04/22 230 lb (104.3 kg)   07/25/22 209 lb 6.4 oz (95 kg)     Body mass index is 33 kg/m².     CBC:   Lab Results   Component Value Date/Time    WBC 10.6 12/04/2022 01:49 PM    RBC 5.83 12/04/2022 01:49 PM    HGB 17.4 12/04/2022 01:49 PM    HCT 53.1 12/04/2022 01:49 PM    MCV 91.1 12/04/2022 01:49 PM    MCV 94.9 02/21/2022 07:55 AM    RDW 13.2 12/04/2022 01:49 PM     12/04/2022 01:49 PM       CMP:   Lab Results   Component Value Date/Time     12/04/2022 01:49 PM    K 4.2 01/05/2023 06:35 AM     12/04/2022 01:49 PM    CO2 26 12/04/2022 01:49 PM    BUN 19 12/04/2022 01:49 PM    CREATININE 1.40 12/04/2022 01:49 PM    GFRAA >60 07/18/2022 08:09 AM    AGRATIO 1.6 02/21/2022 02:36 AM    LABGLOM 59 12/04/2022 01:49 PM    GLUCOSE 108 12/04/2022 01:49 PM    PROT 8.4 12/04/2022 01:49 PM    CALCIUM 9.4 12/04/2022 01:49 PM    BILITOT 0.8 12/04/2022 01:49 PM    ALKPHOS 61 12/04/2022 01:49 PM    ALKPHOS 91 02/21/2022 02:36 AM    AST 51 12/04/2022 01:49 PM    ALT 76 12/04/2022 01:49 PM       POC Tests:   Recent Labs     01/05/23  0703   POCGLU 119*   POCK 4.1       Coags: No results found for: PROTIME, INR, APTT    HCG (If Applicable): No results found for: PREGTESTUR, PREGSERUM, HCG, HCGQUANT     ABGs: No results found for: PHART, PO2ART, QKB4DSW, DBI4UTM, BEART, N2TTVNIO     Type & Screen (If Applicable):  No results found for: LABABO, LABRH    Drug/Infectious Status (If Applicable):  Lab Results   Component Value Date/Time    HEPCAB <0.1 12/21/2020 10:23 AM       COVID-19 Screening (If Applicable):   Lab Results   Component Value Date/Time    COVID19 Not Detected 11/30/2020 09:14 AM           Anesthesia Evaluation  Patient summary reviewed and Nursing notes reviewed no history of anesthetic complications:   Airway: Mallampati: III          Dental: normal exam         Pulmonary: breath sounds clear to auscultation  (+) sleep apnea: on CPAP,                             Cardiovascular:  Exercise tolerance: good (>4 METS),   (+) hypertension:, hyperlipidemia                  Neuro/Psych:   Negative Neuro/Psych ROS              GI/Hepatic/Renal:   (+) GERD: well controlled, renal disease: CRI,           Endo/Other:    (+) DiabetesType II DM, , .                 Abdominal:             Vascular: negative vascular ROS. Other Findings:           Anesthesia Plan      general     ASA 2       Induction: intravenous. Anesthetic plan and risks discussed with patient and spouse.                         Reina Stein MD   1/5/2023

## 2023-01-05 NOTE — H&P
Sg Lr  : 1965         Chief Complaint   Patient presents with    Follow-up            HPI      Sg Lr is a 62 y.o. male here today for ER follow-up due to abrupt onset leftt flank pain. Patient reports he was seen in the ER 2022 due to this pain. He has a long history of stone disease. He reports at the age of 21 he had his first stone. He was hospitalized at that time and sounds like he had to undergo ureteroscopy to retrieve that stone. He did not have any more stones into the age of 36 and since the age of 36 he has had at least 1 or 2 stones a year. CT scan was obtained in the ER. CT results as below     CT Impression  1. Tiny 1 mm stone dependently in the bladder lumen adjacent to the left UVJ in   keeping with a recently passed stone. 2.  Bilateral nonobstructing parapelvic renal stones. 3.  No hydronephrosis or hydroureter. 4.  Constipation suspected          He is unsure as to whether or not he passed a small stone but he has not had any pain since his ER visit. He has not had any fever chills or gross blood. Here now for evaluation and to establish care. He understands that there is a large stone that we will probably need to be treated and that he could not pass on his own.           Past Medical History:   Diagnosis Date    Diabetes ([address]       type 2- oral agents- does not check glucose- last A1C 5.7- 2019- denies hypoglycemia    Generalized osteoarthrosis, unspecified site 2013     knees    GERD (gastroesophageal reflux disease)       resolves with omeprazole prn--    Hepatomegaly 2013    History of athlete's foot      History of cerebral venous sinus thrombosis associated with congenital heart disease       Pt denies any knowledge of this dx 19- Pt denies ever having this dx or symptoms associated with this dx    History of kidney stones      Hodgkin lymphoma ([address]       dx - no recurrence- chemo and stem cell Hyperlipidemia      Hypertension      OA (osteoarthritis)      Obstructive sleep apnea (adult) (pediatric) 9/7/2013     uses CPAP    Prediabetes              Past Surgical History:   Procedure Laterality Date    CATARACT REMOVAL Bilateral 01/2022    COLONOSCOPY N/A 12/11/2017     COLONOSCOPY / BMI=31 performed by Jonathon Joe MD at [address]   12/11/2017          LITHOTRIPSY        LYMPH NODE DISSECTION        OTHER SURGICAL HISTORY         Biopsy    SINUS SURGERY PROC UNLISTED        VASCULAR SURGERY         port insertion and removal - chemo             Current Outpatient Medications   Medication Sig Dispense Refill    ondansetron (ZOFRAN) 8 MG tablet Take 1 tablet by mouth every 8 hours as needed for Nausea or Vomiting 15 tablet 0    oxyCODONE-acetaminophen (PERCOCET) 5-325 MG per tablet Take 1 tablet by mouth every 6 hours as needed for Pain for up to 3 days. 12 tablet 0    predniSONE (DELTASONE) 50 MG tablet Take 1 tablet by mouth daily for 5 days 5 tablet 0    amitriptyline (ELAVIL) 50 MG tablet Take 1 tablet by mouth nightly For sleep/neuropathy 90 tablet 3    atenolol (TENORMIN) 25 MG tablet Take 1 tablet by mouth in the morning. For blood pressure. 90 tablet 2    atorvastatin (LIPITOR) 80 MG tablet Take 1 tablet by mouth in the morning. For cholesterol. 90 tablet 3    celecoxib (CELEBREX) 200 MG capsule Take 1 capsule by mouth in the morning and 1 capsule before bedtime. With food for pain. 180 capsule 3    Dapagliflozin-metFORMIN HCl ER (XIGDUO XR) 5-1000 MG TB24 Take 1 tablet by mouth daily With food for diabetes 90 tablet 3    fenofibrate (TRICOR) 145 MG tablet Take 1 tablet by mouth in the morning. For cholesterol. 90 tablet 3    omeprazole (PRILOSEC) 40 MG delayed release capsule Take 1 capsule by mouth in the morning. On empty stomach and wait at least 30 minutes before eating daily for stomach reflux.  90 capsule 3    clotrimazole-betamethasone (LOTRISONE) 1-0.05 % cream Apply topically 2 times daily For fungal treatment (athlete's foot) 45 g 5    triamcinolone (ARISTOCORT) 0.5 % cream Apply topically 2 times daily For itching/poison ivy/oak rash 60 g 2    nystatin (MYCOSTATIN) 557970 UNIT/ML suspension Take 5 mLs by mouth in the morning and 5 mLs at noon and 5 mLs in the evening and 5 mLs before bedtime. Swish and spit for tongue sensitivity/thrush. 473 mL 2    CPAP Machine MISC 15 cm every night        aspirin 81 MG EC tablet Take 81 mg by mouth daily        Cholecalciferol 50 MCG (2000 UT) TABS Take 2,000 Units by mouth daily          No current facility-administered medications for this visit. Allergies   Allergen Reactions    Penicillins Rash    Sulfa Antibiotics Rash      Social History            Socioeconomic History    Marital status:        Spouse name: Not on file    Number of children: Not on file    Years of education: Not on file    Highest education level: Not on file   Occupational History    Not on file   Tobacco Use    Smoking status: Never    Smokeless tobacco: Never   Substance and Sexual Activity    Alcohol use: No    Drug use: No    Sexual activity: Not on file   Other Topics Concern    Not on file   Social History Narrative    Not on file      Social Determinants of Health      Financial Resource Strain: Not on file   Food Insecurity: Not on file   Transportation Needs: Not on file   Physical Activity: Not on file   Stress: Not on file   Social Connections: Not on file   Intimate Partner Violence: Not on file   Housing Stability: Not on file            Family History   Problem Relation Age of Onset    Heart Attack Mother 61         MI    Elevated Lipids Mother      Hypertension Mother      Heart Disease Mother      No Known Problems Father      No Known Problems Sister      Mult Sclerosis Sister      Cancer Sister           Review of Systems  Constitutional:   Negative for fever.   GI:  Negative for nausea. Genitourinary:  Negative for flank pain. Urinalysis  UA - Dipstick        Results for orders placed or performed in visit on 12/09/22   AMB POC URINALYSIS DIP STICK AUTO W/O MICRO   Result Value Ref Range     Color (UA POC)         Clarity (UA POC)         Glucose, Urine,  Negative     Bilirubin, Urine, POC Negative Negative     KETONES, Urine, POC Negative Negative     Specific Gravity, Urine, POC 1.020 1.001 - 1.035     Blood (UA POC) Small Negative     pH, Urine, POC 6.0 4.6 - 8.0     Protein, Urine, POC Negative Negative     Urobilinogen, POC 0.2 mg/dL       Nitrite, Urine, POC Negative Negative     Leukocyte Esterase, Urine, POC Negative Negative         UA - Micro  WBC - 0  RBC - 0  Bacteria - 0  Epith - 0   Physical Exam  General   Mental Status - Patient is alert and oriented X3. Build & Nutrition - Well nourished. Chest and Lung Exam   Chest and lung exam reveals  - normal excursion with symmetric chest walls, quiet, even and easy respiratory effort with no use of accessory muscles and on auscultation, normal breath sounds, no adventitious sounds and normal vocal resonance. Cardiovascular   Cardiovascular examination reveals  - normal heart sounds, regular rate and rhythm with no murmurs. Abdomen   Palpation/Percussion: Palpation and Percussion of the abdomen reveal - Non Tender, No Rebound tenderness, No Rigidity (guarding), No hepatosplenomegaly, No Palpable abdominal masses and Soft. Hernia - Bilateral - No Hernia(s) present. KUB: Normal gas pattern is present. Within the right kidney there are 2 to 3 stones in the lower pole with the largest measuring about 3 mm. In the left kidney there are at least 4 stones the largest measuring 15 mm x 19 mm in the mid to upper pole in the lower pole of that kidney there are at least 3 stones with the largest measuring about 4 mm. There is no ureteral stones or bladder stones. Assessment and Plan      ICD-10-CM     1. Nephrolithiasis  N20.0 AMB POC URINALYSIS DIP STICK AUTO W/O MICRO       XR ABD (KUB) (40548) - POC for Marsha ONLY       oxyCODONE-acetaminophen (PERCOCET) 5-325 MG per tablet          Stone disease was discussed in great detail today. I do feel patient would benefit from 32-TWIX metabolic work-up. He would like to proceed with intervention of the stones. We discussed ureteroscopy versus ESWL versus PCNL. He would like to try ESWL if possible. He is going to check with his insurance to see when it starts over. He would like to wait to the first the year since a deductible has not been made. PLAN:  We will schedule him for left ESWL. We will treat the large stone in the upper pole. He understands it may take multiple treatments and he also understands if any of the pieces lodged in the ureter he may require ureteroscopy. I will send him in a prescription for pain and nausea medication to keep on hand. Surgery scheduler will call to arrange. Treatment options with risk and benefits were discussed with patient in detail. Treatment options discussed including watchful waiting in hopes that the stone will pass, ESWL and ureteroscopy with laser lithotripsy. Plan is for left ESWL. Risk include but are not limited to bleeding, infection, renal injury requiring repair, risk of anesthesia and risk of incomplete fragmentation of the stone requiring further treatments such as ureteroscopy or repeat ESWL.

## 2023-01-05 NOTE — PROGRESS NOTES
Spiritual Care visit. Initial Visit, Pre Surgery Consult. Visit and prayer before patient goes to surgery.     Visit by Sparkle Menard M.Ed., Th.B. ,Staff

## 2023-01-05 NOTE — ANESTHESIA POSTPROCEDURE EVALUATION
Department of Anesthesiology  Postprocedure Note    Patient: Savage Bonds  MRN: [id number]  YOB: 1965  Date of evaluation: 1/5/2023      Procedure Summary     Date: 01/05/23 Room / Location: [facility] MAIN OR 03 / [facility] MAIN OR    Anesthesia Start: 0754 Anesthesia Stop: 4023    Procedure: ESWL EXTRACORPOREAL SHOCK WAVE LITHOTRIPSY, LEFT (Left: Abdomen) Diagnosis:       Kidney stone      (Kidney stone [N20.0])    Providers: Leona Whittington MD Responsible Provider: Gen Shaffer MD    Anesthesia Type: General ASA Status: 2          Anesthesia Type: General    Edgar Phase I: Edgar Score: 8    Edgar Phase II:        Anesthesia Post Evaluation    Patient location during evaluation: PACU  Patient participation: complete - patient participated  Level of consciousness: awake  Airway patency: patent  Nausea & Vomiting: no nausea  Complications: no  Cardiovascular status: hemodynamically stable  Respiratory status: acceptable and nonlabored ventilation  Hydration status: stable  Multimodal analgesia pain management approach

## 2023-01-16 ENCOUNTER — OFFICE VISIT (OUTPATIENT)
Dept: UROLOGY | Age: 58
End: 2023-01-16
Payer: COMMERCIAL

## 2023-01-16 DIAGNOSIS — N20.0 NEPHROLITHIASIS: Primary | ICD-10-CM

## 2023-01-16 LAB
BILIRUBIN, URINE, POC: NEGATIVE
BLOOD URINE, POC: NORMAL
GLUCOSE URINE, POC: 500
KETONES, URINE, POC: NEGATIVE
LEUKOCYTE ESTERASE, URINE, POC: NEGATIVE
NITRITE, URINE, POC: NEGATIVE
PH, URINE, POC: 5 (ref 4.6–8)
PROTEIN,URINE, POC: 30
SPECIFIC GRAVITY, URINE, POC: 1.03 (ref 1–1.03)
URINALYSIS CLARITY, POC: NORMAL
URINALYSIS COLOR, POC: NORMAL
UROBILINOGEN, POC: NORMAL

## 2023-01-16 PROCEDURE — 81003 URINALYSIS AUTO W/O SCOPE: CPT | Performed by: NURSE PRACTITIONER

## 2023-01-16 PROCEDURE — 99024 POSTOP FOLLOW-UP VISIT: CPT | Performed by: NURSE PRACTITIONER

## 2023-01-16 RX ORDER — OXYCODONE HYDROCHLORIDE AND ACETAMINOPHEN 5; 325 MG/1; MG/1
1 TABLET ORAL EVERY 6 HOURS PRN
Qty: 12 TABLET | Refills: 0 | Status: SHIPPED | OUTPATIENT
Start: 2023-01-16 | End: 2023-01-19

## 2023-01-16 ASSESSMENT — ENCOUNTER SYMPTOMS
BACK PAIN: 0
NAUSEA: 0

## 2023-01-16 NOTE — PROGRESS NOTES
[address]  [phone]          Mo Casey  : 1965    Chief Complaint   Patient presents with    Nephrolithiasis          HPI     Mo Casey is a 62 y.o. male  Patient returns today for follow-up after undergoing a left ESWL. Patient had large stone in the mid to upper pole of the left kidney measuring 19 x 15 mm. It appeared that a total of 3000 shockwaves were given there was minimal change in the stone after lithotripsy. Patient is back today for postop visit and also to discuss other treatment options available. Overall he is doing well has not had any pain discomfort and has not passed any stone fragments. No fever chills have been reported.         Past Medical History:   Diagnosis Date    Diabetes ([facility] [address]     type 2- oral agents- does not check glucose; denies hypoglycemia; A1c 6.3 22    Generalized osteoarthrosis, unspecified site 2013    knees    GERD (gastroesophageal reflux disease)     prn meds    Hepatomegaly 2013    History of athlete's foot     History of cerebral venous sinus thrombosis associated with congenital heart disease     Pt denies any knowledge of this dx - Pt denies ever having this dx    History of kidney stones     Hodgkin lymphoma ([facility] [address]     dx - no recurrence- chemo and stem cell     Hyperlipidemia     Hypertension     medication    OA (osteoarthritis)     knees    Obstructive sleep apnea (adult) (pediatric) 2013    uses CPAP    Prediabetes      Past Surgical History:   Procedure Laterality Date    CATARACT REMOVAL Bilateral 2022    COLONOSCOPY N/A 2017    COLONOSCOPY / BMI=31 performed by Yessenia Santiago MD at [address] [address] [facility]  2017         LITHOTRIPSY      LITHOTRIPSY Left 2023    ESWL EXTRACORPOREAL SHOCK WAVE LITHOTRIPSY, LEFT performed by Iman Leone MD at [address] DISSECTION      OTHER SURGICAL HISTORY      Biopsy    SINUS SURGERY PROC UNLISTED      VASCULAR SURGERY      port insertion and removal - chemo     Current Outpatient Medications   Medication Sig Dispense Refill    ondansetron (ZOFRAN) 8 MG tablet Take 1 tablet by mouth every 8 hours as needed for Nausea or Vomiting 15 tablet 0    amitriptyline (ELAVIL) 50 MG tablet Take 1 tablet by mouth nightly For sleep/neuropathy 90 tablet 3    atenolol (TENORMIN) 25 MG tablet Take 1 tablet by mouth in the morning. For blood pressure. (Patient taking differently: Take 25 mg by mouth at bedtime For blood pressure) 90 tablet 2    atorvastatin (LIPITOR) 80 MG tablet Take 1 tablet by mouth in the morning. For cholesterol. (Patient taking differently: Take 80 mg by mouth nightly For cholesterol) 90 tablet 3    celecoxib (CELEBREX) 200 MG capsule Take 1 capsule by mouth in the morning and 1 capsule before bedtime. With food for pain. 180 capsule 3    Dapagliflozin-metFORMIN HCl ER (XIGDUO XR) 5-1000 MG TB24 Take 1 tablet by mouth daily With food for diabetes (Patient taking differently: Take 1 tablet by mouth nightly With food for diabetes) 90 tablet 3    fenofibrate (TRICOR) 145 MG tablet Take 1 tablet by mouth in the morning. For cholesterol. (Patient taking differently: Take 145 mg by mouth nightly For cholesterol) 90 tablet 3    omeprazole (PRILOSEC) 40 MG delayed release capsule Take 1 capsule by mouth in the morning. On empty stomach and wait at least 30 minutes before eating daily for stomach reflux. 90 capsule 3    clotrimazole-betamethasone (LOTRISONE) 1-0.05 % cream Apply topically 2 times daily For fungal treatment (athlete's foot) 45 g 5    triamcinolone (ARISTOCORT) 0.5 % cream Apply topically 2 times daily For itching/poison ivy/oak rash 60 g 2    nystatin (MYCOSTATIN) 761912 UNIT/ML suspension Take 5 mLs by mouth in the morning and 5 mLs at noon and 5 mLs in the evening and 5 mLs before bedtime.  Swish and spit for tongue sensitivity/thrush. 473 mL 2    CPAP Machine MISC 15 cm every night      aspirin 81 MG EC tablet Take 81 mg by mouth nightly       No current facility-administered medications for this visit. Allergies   Allergen Reactions    Penicillins Rash    Sulfa Antibiotics Rash     Social History     Socioeconomic History    Marital status:      Spouse name: Not on file    Number of children: Not on file    Years of education: Not on file    Highest education level: Not on file   Occupational History    Not on file   Tobacco Use    Smoking status: Never    Smokeless tobacco: Never   Vaping Use    Vaping Use: Never used   Substance and Sexual Activity    Alcohol use: No    Drug use: No    Sexual activity: Not on file   Other Topics Concern    Not on file   Social History Narrative    Not on file     Social Determinants of Health     Financial Resource Strain: Not on file   Food Insecurity: Not on file   Transportation Needs: Not on file   Physical Activity: Not on file   Stress: Not on file   Social Connections: Not on file   Intimate Partner Violence: Not on file   Housing Stability: Not on file     Family History   Problem Relation Age of Onset    Heart Attack Mother 61        MI    Elevated Lipids Mother     Hypertension Mother     Heart Disease Mother     No Known Problems Father     No Known Problems Sister     Mult Sclerosis Sister     Cancer Sister        Review of Systems  Constitutional:   Negative for fever. GI:  Negative for nausea. Musculoskeletal:  Negative for back pain.     Urinalysis  UA - Dipstick  Results for orders placed or performed in visit on 01/16/23   AMB POC URINALYSIS DIP STICK AUTO W/O MICRO   Result Value Ref Range    Color (UA POC)      Clarity (UA POC)      Glucose, Urine,  Negative    Bilirubin, Urine, POC Negative Negative    KETONES, Urine, POC Negative Negative    Specific Gravity, Urine, POC 1.03 1.001 - 1.035    Blood (UA POC) Large Negative    pH, Urine, POC 5.0 4.6 - 8.0    Protein, Urine, POC 30 Negative    Urobilinogen, POC 0.2 mg/dL     Nitrite, Urine, POC Negative Negative    Leukocyte Esterase, Urine, POC Negative Negative   PHYSICAL EXAM      GENERAL: No acute distress, Awake, Alert, Oriented X 3, Gait normal  CHEST AND LUNG: Easy work of breathing, clear to auscultation bilaterally, no cyanosis  CARDIAC: regular rate and rhythm  ABDOMEN: soft, non tender, non-distended, positive bowel sounds, no organomegaly, no palpable masses, no guarding, no rebound tenderness  SKIN: No rash, no erythema, no lacerations or abrasions, no ecchymosis  MUSCULOSKELETAL- normal gait and station. KUB: Normal gas pattern is present. Within the lower pole the right kidney there is a couple small stones. Within the left kidney the 19 x 15 mm stone is unchanged and there are 3 other stones in the lower pole the kidney measuring 3 to 5 mm each. Assessment and Plan    ICD-10-CM    1. Nephrolithiasis  N20.0 AMB POC URINALYSIS DIP STICK AUTO W/O MICRO     XR ABDOMEN (KUB) (SINGLE AP VIEW)        KUB was shown to patient and other options were discussed in great detail. I explained to patient I do not feel like a repeat ESWL would be beneficial.  We discussed ureteroscopy/pyeloscopy with holmium laser versus PCNL. He was given hand written material about both of these procedures. He will discuss with his wife. I will discuss with Dr. Tom Wilkins on Wednesday and I will call patient. Patient wanted to know what I thought was the best procedure and I feel the left PCNL would be the best option to rid him of the stones in the left kidney. ALYSA Jeffery - NP  Dr. Allen Pace is supervising physician today and he approves plan of care. Return for I will call. Elements of this note have been dictated using speech recognition software. Although reviewed, errors of speech recognition may have occurred.      ADDENDUM:    Patient would like to proceed with left percutaneous nephrolithotomy next available time. I reviewed the risks/benefits/alternatives for stone treatment today in detail with the patient. Treatment options discussed include medical expulsive therapy (MET) vs. ESWL vs. Ureteroscopy/laser lithotropsy vs. PCNL. After our discussion, the patient would like to proceed with LEFT percutaneous nephrolithotomy, possible antegrade stent placement. Risks of PCNL that we discussed were bleeding, infection, injury to the bladder/ureter/kidney/bowel and surrounding structures, pneumothorax, need for chest tube, need for nephrostomy tube, incomplete fragmentation of stones, steinstrasse, inability to remove all stone fragments in 1 operative setting requiring additional operative intervention in the form of second look PCNL vs. ESWL vs ureteroscopy/laser lithotripsy and/or stent placement, nephro-cutaneous fistula, ureteral stricture, need for ureteral stent, stent migration, stent pain, urinary retention, risks of general anesthesia, recurrent stones. The patient expressed understanding of the above.

## 2023-01-19 ENCOUNTER — TELEPHONE (OUTPATIENT)
Dept: UROLOGY | Age: 58
End: 2023-01-19

## 2023-01-19 RX ORDER — CIPROFLOXACIN 2 MG/ML
400 INJECTION, SOLUTION INTRAVENOUS
OUTPATIENT
Start: 2023-01-19 | End: 2023-01-19

## 2023-01-19 NOTE — TELEPHONE ENCOUNTER
Dr. Teri Ford reviewed the KUB and again the options were discussed including ureteroscopy and PCNL. We do feel the PCNL would be the best chance to rid the patient of the stones within the kidney. The patient discussed this with his wife and he would like to proceed with left percutaneous nephrolithotomy. Please arrange left PCNL with Dr. Teri Ford. Call to arrange time with pt.      Thank you,   Renee Huerta

## 2023-01-23 ENCOUNTER — NURSE ONLY (OUTPATIENT)
Dept: FAMILY MEDICINE CLINIC | Facility: CLINIC | Age: 58
End: 2023-01-23
Payer: COMMERCIAL

## 2023-01-23 DIAGNOSIS — E11.40 CONTROLLED TYPE 2 DIABETES MELLITUS WITH NEUROPATHY (HCC): ICD-10-CM

## 2023-01-23 DIAGNOSIS — E55.9 VITAMIN D DEFICIENCY: ICD-10-CM

## 2023-01-23 DIAGNOSIS — E78.00 PURE HYPERCHOLESTEROLEMIA: ICD-10-CM

## 2023-01-23 LAB
ALBUMIN SERPL-MCNC: 4.1 G/DL (ref 3.5–5)
ALBUMIN/GLOB SERPL: 1.4 (ref 0.4–1.6)
ALP SERPL-CCNC: 57 U/L (ref 50–136)
ALT SERPL-CCNC: 77 U/L (ref 12–65)
ANION GAP SERPL CALC-SCNC: 8 MMOL/L (ref 2–11)
AST SERPL-CCNC: 44 U/L (ref 15–37)
BILIRUB SERPL-MCNC: 0.6 MG/DL (ref 0.2–1.1)
BILIRUBIN, URINE, POC: NEGATIVE
BLOOD URINE, POC: ABNORMAL
BUN SERPL-MCNC: 15 MG/DL (ref 6–23)
CALCIUM SERPL-MCNC: 9.3 MG/DL (ref 8.3–10.4)
CHLORIDE SERPL-SCNC: 109 MMOL/L (ref 101–110)
CHOLEST SERPL-MCNC: 167 MG/DL
CO2 SERPL-SCNC: 26 MMOL/L (ref 21–32)
CREAT SERPL-MCNC: 1.1 MG/DL (ref 0.8–1.5)
EST. AVERAGE GLUCOSE BLD GHB EST-MCNC: 137 MG/DL
GLOBULIN SER CALC-MCNC: 2.9 G/DL (ref 2.8–4.5)
GLUCOSE SERPL-MCNC: 133 MG/DL (ref 65–100)
GLUCOSE URINE, POC: ABNORMAL
GRANS ABSOLUTE, POC: 4 K/UL
GRANULOCYTES %, POC: 50 %
HBA1C MFR BLD: 6.4 % (ref 4.8–5.6)
HDLC SERPL-MCNC: 30 MG/DL (ref 40–60)
HDLC SERPL: 5.6
HEMATOCRIT, POC: 51 %
HEMOGLOBIN, POC: 16.8 G/DL
KETONES, URINE, POC: NEGATIVE
LDLC SERPL CALC-MCNC: 85 MG/DL
LEUKOCYTE ESTERASE, URINE, POC: NEGATIVE
LYMPHOCYTE %, POC: 40.9 %
LYMPHS ABSOLUTE, POC: 3.2 K/UL
MCH, POC: 31.1 PG (ref 20–?)
MCHC, POC: 32.7
MCV, POC: 95
MICROALB/CREAT RATIO, POC: ABNORMAL
MICROALBUMIN URINE, POC: 50 MG/L
MONOCYTE %, POC: 9.1 %
MONOCYTE, ABSOLUTE POC: 0.7 K/UL
MPV, POC: 7.8 FL
NITRITE, URINE, POC: NEGATIVE
PH, URINE, POC: 5.5 (ref 4.6–8)
PLATELET COUNT, POC: 319 K/UL
POTASSIUM SERPL-SCNC: 4.5 MMOL/L (ref 3.5–5.1)
PROT SERPL-MCNC: 7 G/DL (ref 6.3–8.2)
PROTEIN,URINE, POC: NEGATIVE
RBC, POC: 5.4 M/UL
RDW, POC: 13.6 %
SODIUM SERPL-SCNC: 143 MMOL/L (ref 133–143)
SPECIFIC GRAVITY, URINE, POC: 1.03 (ref 1–1.03)
TRIGL SERPL-MCNC: 260 MG/DL (ref 35–150)
TSH, 3RD GENERATION: 3.18 UIU/ML (ref 0.36–3.74)
URINALYSIS CLARITY, POC: CLEAR
URINALYSIS COLOR, POC: YELLOW
UROBILINOGEN, POC: NORMAL
VLDLC SERPL CALC-MCNC: 52 MG/DL (ref 6–23)
WBC, POC: 7.9 K/UL

## 2023-01-23 PROCEDURE — 81001 URINALYSIS AUTO W/SCOPE: CPT | Performed by: NURSE PRACTITIONER

## 2023-01-23 PROCEDURE — 85025 COMPLETE CBC W/AUTO DIFF WBC: CPT | Performed by: NURSE PRACTITIONER

## 2023-01-23 PROCEDURE — 82043 UR ALBUMIN QUANTITATIVE: CPT | Performed by: NURSE PRACTITIONER

## 2023-01-24 LAB — 25(OH)D3 SERPL-MCNC: 32.7 NG/ML (ref 30–100)

## 2023-01-27 NOTE — TELEPHONE ENCOUNTER
Procedures: Procedure(s):   NEPHROLITHOTOMY PERCUTANEOUS RIGHT   Date: 2/9/2023   Time: 1500   Location: [facility] MAIN OR 02     Scheduled. Please complete orders.

## 2023-01-30 ENCOUNTER — OFFICE VISIT (OUTPATIENT)
Dept: FAMILY MEDICINE CLINIC | Facility: CLINIC | Age: 58
End: 2023-01-30
Payer: COMMERCIAL

## 2023-01-30 VITALS
BODY MASS INDEX: 33.27 KG/M2 | WEIGHT: 232.4 LBS | DIASTOLIC BLOOD PRESSURE: 74 MMHG | HEART RATE: 87 BPM | RESPIRATION RATE: 18 BRPM | SYSTOLIC BLOOD PRESSURE: 136 MMHG | HEIGHT: 70 IN | OXYGEN SATURATION: 97 %

## 2023-01-30 DIAGNOSIS — Z12.11 COLON CANCER SCREENING: ICD-10-CM

## 2023-01-30 DIAGNOSIS — M19.90 ARTHRITIS: ICD-10-CM

## 2023-01-30 DIAGNOSIS — L98.9 SKIN LESION OF FACE: ICD-10-CM

## 2023-01-30 DIAGNOSIS — E55.9 VITAMIN D DEFICIENCY: ICD-10-CM

## 2023-01-30 DIAGNOSIS — G47.33 OSA ON CPAP: ICD-10-CM

## 2023-01-30 DIAGNOSIS — E11.40 CONTROLLED TYPE 2 DIABETES MELLITUS WITH NEUROPATHY (HCC): Primary | ICD-10-CM

## 2023-01-30 DIAGNOSIS — Z99.89 OSA ON CPAP: ICD-10-CM

## 2023-01-30 DIAGNOSIS — B35.3 TINEA PEDIS OF BOTH FEET: ICD-10-CM

## 2023-01-30 DIAGNOSIS — I10 ESSENTIAL HYPERTENSION, BENIGN: ICD-10-CM

## 2023-01-30 DIAGNOSIS — Z12.5 SPECIAL SCREENING FOR MALIGNANT NEOPLASM OF PROSTATE: ICD-10-CM

## 2023-01-30 DIAGNOSIS — R79.89 ELEVATED LIVER FUNCTION TESTS: ICD-10-CM

## 2023-01-30 DIAGNOSIS — E78.00 PURE HYPERCHOLESTEROLEMIA: ICD-10-CM

## 2023-01-30 PROCEDURE — 99215 OFFICE O/P EST HI 40 MIN: CPT | Performed by: NURSE PRACTITIONER

## 2023-01-30 PROCEDURE — 3044F HG A1C LEVEL LT 7.0%: CPT | Performed by: NURSE PRACTITIONER

## 2023-01-30 PROCEDURE — 3078F DIAST BP <80 MM HG: CPT | Performed by: NURSE PRACTITIONER

## 2023-01-30 PROCEDURE — 3075F SYST BP GE 130 - 139MM HG: CPT | Performed by: NURSE PRACTITIONER

## 2023-01-30 RX ORDER — OMEPRAZOLE 40 MG/1
40 CAPSULE, DELAYED RELEASE ORAL DAILY
Qty: 90 CAPSULE | Refills: 3 | Status: SHIPPED | OUTPATIENT
Start: 2023-01-30

## 2023-01-30 RX ORDER — ATENOLOL 25 MG/1
25 TABLET ORAL DAILY
Qty: 90 TABLET | Refills: 3 | Status: SHIPPED | OUTPATIENT
Start: 2023-01-30

## 2023-01-30 RX ORDER — FENOFIBRATE 160 MG/1
160 TABLET ORAL DAILY
Qty: 90 TABLET | Refills: 3 | Status: SHIPPED | OUTPATIENT
Start: 2023-01-30

## 2023-01-30 RX ORDER — DAPAGLIFLOZIN AND METFORMIN HYDROCHLORIDE 5; 1000 MG/1; MG/1
1 TABLET, FILM COATED, EXTENDED RELEASE ORAL DAILY
Qty: 90 TABLET | Refills: 3 | Status: SHIPPED | OUTPATIENT
Start: 2023-01-30

## 2023-01-30 RX ORDER — CELECOXIB 200 MG/1
200 CAPSULE ORAL 2 TIMES DAILY
Qty: 180 CAPSULE | Refills: 3 | Status: SHIPPED | OUTPATIENT
Start: 2023-01-30

## 2023-01-30 RX ORDER — AMITRIPTYLINE HYDROCHLORIDE 50 MG/1
50 TABLET, FILM COATED ORAL NIGHTLY
Qty: 90 TABLET | Refills: 3 | Status: SHIPPED | OUTPATIENT
Start: 2023-01-30

## 2023-01-30 RX ORDER — ATORVASTATIN CALCIUM 80 MG/1
80 TABLET, FILM COATED ORAL NIGHTLY
Qty: 90 TABLET | Refills: 3 | Status: SHIPPED | OUTPATIENT
Start: 2023-01-30

## 2023-01-30 RX ORDER — CLOTRIMAZOLE AND BETAMETHASONE DIPROPIONATE 10; .64 MG/G; MG/G
CREAM TOPICAL
Qty: 45 G | Refills: 5 | Status: SHIPPED | OUTPATIENT
Start: 2023-01-30

## 2023-01-30 SDOH — ECONOMIC STABILITY: FOOD INSECURITY: WITHIN THE PAST 12 MONTHS, YOU WORRIED THAT YOUR FOOD WOULD RUN OUT BEFORE YOU GOT MONEY TO BUY MORE.: NEVER TRUE

## 2023-01-30 SDOH — ECONOMIC STABILITY: FOOD INSECURITY: WITHIN THE PAST 12 MONTHS, THE FOOD YOU BOUGHT JUST DIDN'T LAST AND YOU DIDN'T HAVE MONEY TO GET MORE.: NEVER TRUE

## 2023-01-30 ASSESSMENT — ANXIETY QUESTIONNAIRES
6. BECOMING EASILY ANNOYED OR IRRITABLE: 0
3. WORRYING TOO MUCH ABOUT DIFFERENT THINGS: 0
7. FEELING AFRAID AS IF SOMETHING AWFUL MIGHT HAPPEN: 0
4. TROUBLE RELAXING: 0
1. FEELING NERVOUS, ANXIOUS, OR ON EDGE: 0
5. BEING SO RESTLESS THAT IT IS HARD TO SIT STILL: 0
IF YOU CHECKED OFF ANY PROBLEMS ON THIS QUESTIONNAIRE, HOW DIFFICULT HAVE THESE PROBLEMS MADE IT FOR YOU TO DO YOUR WORK, TAKE CARE OF THINGS AT HOME, OR GET ALONG WITH OTHER PEOPLE: NOT DIFFICULT AT ALL
2. NOT BEING ABLE TO STOP OR CONTROL WORRYING: 0
GAD7 TOTAL SCORE: 0

## 2023-01-30 ASSESSMENT — PATIENT HEALTH QUESTIONNAIRE - PHQ9
1. LITTLE INTEREST OR PLEASURE IN DOING THINGS: 0
SUM OF ALL RESPONSES TO PHQ QUESTIONS 1-9: 0
2. FEELING DOWN, DEPRESSED OR HOPELESS: 0
SUM OF ALL RESPONSES TO PHQ QUESTIONS 1-9: 0
SUM OF ALL RESPONSES TO PHQ QUESTIONS 1-9: 0
SUM OF ALL RESPONSES TO PHQ9 QUESTIONS 1 & 2: 0
SUM OF ALL RESPONSES TO PHQ QUESTIONS 1-9: 0

## 2023-01-30 ASSESSMENT — ENCOUNTER SYMPTOMS
CONSTIPATION: 0
RECTAL PAIN: 0
CHOKING: 0
ANAL BLEEDING: 0
FACIAL SWELLING: 0
SORE THROAT: 0
APNEA: 0
ABDOMINAL DISTENTION: 0
SINUS PRESSURE: 0
DIARRHEA: 0
VOMITING: 0
TROUBLE SWALLOWING: 0
EYE ITCHING: 0
RESPIRATORY NEGATIVE: 1
SINUS PAIN: 0
EYE DISCHARGE: 0
NAUSEA: 0
BLOOD IN STOOL: 0
ALLERGIC/IMMUNOLOGIC NEGATIVE: 1
GASTROINTESTINAL NEGATIVE: 1
PHOTOPHOBIA: 0
SHORTNESS OF BREATH: 0
STRIDOR: 0
RHINORRHEA: 0
EYE REDNESS: 0
EYE PAIN: 0
BACK PAIN: 0
EYES NEGATIVE: 1
COUGH: 0
CHEST TIGHTNESS: 0
COLOR CHANGE: 0
WHEEZING: 0
VOICE CHANGE: 0
ABDOMINAL PAIN: 0

## 2023-01-30 ASSESSMENT — SOCIAL DETERMINANTS OF HEALTH (SDOH): HOW HARD IS IT FOR YOU TO PAY FOR THE VERY BASICS LIKE FOOD, HOUSING, MEDICAL CARE, AND HEATING?: NOT HARD AT ALL

## 2023-01-30 NOTE — PROGRESS NOTES
PROGRESS NOTE    Chief Complaint   Patient presents with    Follow-up     Presenting for follow up on labs, HLD, HTN and DM2. Last colonoscopy 2017, lesion removed, repeat Q5Y. SUBJECTIVE:     Thi Cox is a very pleasant 62 y.o. male with hx of Hodgin/lymphoma - currently following oncology, diabetes, hypertension and hyperlipidemia, seen today in office for follow-up, lab results review and medication refill. Diabetes   This is a chronic problem. The problem has not changed since onset. Pertinent negatives include no chest pain, no abdominal  pain, no headaches and no shortness of breath. Nothing aggravates the symptoms. The symptoms are relieved by medications. Treatments tried: xigduo. The treatment provided moderate relief. He also reports having  neuropathy symptoms more along the lines of numbness instead of tingling sensation - controlled with use of amitryptyline daily at bedtime. He is currently following [facility] for his annual retinopathy examination. He will be due for his repeat in the next couple months. He will be contacting for an appointment. He agrees to let us know as well as us to request for a copy of record for his chart for record update. Hypertension    This is a chronic problem. The problem has not changed since onset. Reports compliance with low sodium diet. Pertinent negatives include no chest pain, no orthopnea, no palpitations, no PND, no anxiety, no  confusion, no malaise/fatigue, no blurred vision, no headaches, no tinnitus, no peripheral edema, no dizziness, no shortness of breath, no nausea and no vomiting. Medications have helped lower blood pressure. Risk factors include a sedentary  lifestyle and hypertension. Of note, patient has been having multiple kidney stones where he went to the ED in December for evaluation. He is currently following urology.   He recently had a lithotripsy that was not successful and is currently scheduled for a nephrolithotomy percutaneous right IR tube placement on 2/9. Patient reports no chest pain, no shortness of breath, no unilateral or focal weakness, no orthopnea or PND, no hematuria, no urinary urgency, no urinary frequency, no flank tenderness, no incontinence of bladder or bowel function. Past Medical History, Past Surgical History, Family history, Social History, and Medications were all reviewed with the patient today and updated as necessary. Current Outpatient Medications   Medication Sig Dispense Refill    amitriptyline (ELAVIL) 50 MG tablet Take 1 tablet by mouth nightly For sleep/neuropathy 90 tablet 3    atenolol (TENORMIN) 25 MG tablet Take 1 tablet by mouth daily For blood pressure 90 tablet 3    celecoxib (CELEBREX) 200 MG capsule Take 1 capsule by mouth 2 times daily With food for pain 180 capsule 3    Dapagliflozin-metFORMIN HCl ER (XIGDUO XR) 5-1000 MG TB24 Take 1 tablet by mouth daily With food for diabetes 90 tablet 3    clotrimazole-betamethasone (LOTRISONE) 1-0.05 % cream Apply to 2 times per day for fungal treatment (athlete's foot) 45 g 5    omeprazole (PRILOSEC) 40 MG delayed release capsule Take 1 capsule by mouth daily On empty stomach and wait at least 30 minutes before eating daily for stomach reflux 90 capsule 3    atorvastatin (LIPITOR) 80 MG tablet Take 1 tablet by mouth nightly For cholesterol 90 tablet 3    fenofibrate (TRIGLIDE) 160 MG tablet Take 1 tablet by mouth daily For cholesterol 90 tablet 3    ondansetron (ZOFRAN) 8 MG tablet Take 1 tablet by mouth every 8 hours as needed for Nausea or Vomiting 15 tablet 0    triamcinolone (ARISTOCORT) 0.5 % cream Apply topically 2 times daily For itching/poison ivy/oak rash 60 g 2    CPAP Machine MISC 15 cm every night      aspirin 81 MG EC tablet Take 81 mg by mouth nightly       No current facility-administered medications for this visit.      Allergies   Allergen Reactions    Penicillins Rash    Sulfa Antibiotics Rash Patient Active Problem List   Diagnosis    Hyperlipemia    Hepatomegaly    Arthritis    GERD (gastroesophageal reflux disease)    Chronic kidney disease    Essential hypertension, benign    Reflux esophagitis    Severe obesity ([address]    Type 2 diabetes mellitus without complication, without long-term current use of insulin (HCC)    KATERINA on CPAP    Kidney stone     Past Medical History:   Diagnosis Date    Diabetes ([facility]     type 2- oral agents- does not check glucose; denies hypoglycemia; A1c 6.3 6/17/22    Generalized osteoarthrosis, unspecified site 9/7/2013    knees    GERD (gastroesophageal reflux disease)     prn meds    Hepatomegaly 9/7/2013    History of athlete's foot     History of cerebral venous sinus thrombosis associated with congenital heart disease     Pt denies any knowledge of this dx 2019- Pt denies ever having this dx    History of kidney stones     Hodgkin lymphoma ([facility] [address]     dx 2001- no recurrence- chemo and stem cell     Hyperlipidemia     Hypertension     medication    OA (osteoarthritis)     knees    Obstructive sleep apnea (adult) (pediatric) 9/7/2013    uses CPAP    Prediabetes      Past Surgical History:   Procedure Laterality Date    CATARACT REMOVAL Bilateral 01/2022    COLONOSCOPY N/A 12/11/2017    COLONOSCOPY / BMI=31 performed by Aicha Hopkins MD at [address] [place] [address]  12/11/2017         LITHOTRIPSY      LITHOTRIPSY Left 1/5/2023    ESWL EXTRACORPOREAL SHOCK WAVE LITHOTRIPSY, LEFT performed by Rita Kenney MD at [facility] MAIN OR    LYMPH NODE DISSECTION      OTHER SURGICAL HISTORY      Biopsy    SINUS SURGERY PROC UNLISTED      VASCULAR SURGERY      port insertion and removal - chemo     Family History   Problem Relation Age of Onset    Heart Attack Mother 61        MI    Elevated Lipids Mother     Hypertension Mother     Heart Disease Mother     No Known Problems Father     No Known Problems Sister     Mult Sclerosis Sister     Cancer Sister      Social History     Tobacco Use    Smoking status: Never    Smokeless tobacco: Never   Substance Use Topics    Alcohol use: No         REVIEW OF SYSTEM    Review of Systems   Constitutional: Negative. Negative for activity change, appetite change, chills, diaphoresis, fatigue, fever and unexpected weight change. HENT: Negative. Negative for congestion, dental problem, drooling, ear discharge, ear pain, facial swelling, hearing loss, mouth sores, nosebleeds, postnasal drip, rhinorrhea, sinus pressure, sinus pain, sneezing, sore throat, tinnitus, trouble swallowing and voice change. Eyes: Negative. Negative for photophobia, pain, discharge, redness, itching and visual disturbance. Respiratory: Negative. Negative for apnea, cough, choking, chest tightness, shortness of breath, wheezing and stridor. Cardiovascular: Negative. Negative for chest pain, palpitations and leg swelling. Gastrointestinal: Negative. Negative for abdominal distention, abdominal pain, anal bleeding, blood in stool, constipation, diarrhea, nausea, rectal pain and vomiting. Endocrine: Negative. Negative for cold intolerance, heat intolerance, polydipsia, polyphagia and polyuria. Genitourinary: Negative. Negative for decreased urine volume, difficulty urinating, dysuria, enuresis, flank pain (Significantly improved from initial presentation of symptoms), frequency, genital sores, hematuria, penile discharge, penile pain, penile swelling, scrotal swelling, testicular pain and urgency. Musculoskeletal: Negative. Negative for arthralgias, back pain, gait problem, joint swelling, myalgias, neck pain and neck stiffness. Skin: Negative. Negative for color change, pallor, rash (Right plantar surface at arch with itching) and wound. Allergic/Immunologic: Negative. Negative for environmental allergies, food allergies and immunocompromised state. Neurological: Negative.   Negative for dizziness, tremors, seizures, syncope, facial asymmetry, speech difficulty, weakness, light-headedness, numbness and headaches. Hematological: Negative. Negative for adenopathy. Does not bruise/bleed easily. Psychiatric/Behavioral: Negative. Negative for agitation, behavioral problems, confusion, decreased concentration, dysphoric mood, hallucinations, self-injury, sleep disturbance and suicidal ideas. The patient is not nervous/anxious and is not hyperactive. OBJECTIVE:  /74 (Site: Left Upper Arm, Position: Sitting, Cuff Size: Large Adult)   Pulse 87   Resp 18   Ht 5' 10\" (1.778 m)   Wt 232 lb 6.4 oz (105.4 kg)   SpO2 97%   BMI 33.35 kg/m²      Physical Exam  Vitals and nursing note reviewed. Constitutional:       General: He is not in acute distress. Appearance: Normal appearance. He is not ill-appearing, toxic-appearing or diaphoretic. HENT:      Head: Normocephalic and atraumatic. Right Ear: Tympanic membrane and ear canal normal. There is no impacted cerumen. Left Ear: Tympanic membrane, ear canal and external ear normal. There is no impacted cerumen. Ears:      Comments: Right auricle with a small hypertrophic circular flesh-colored uniform in coloration skin lesion measuring 3 mm in diameter consistent with a basal cell carcinoma. Right temporal area with a macular hyperpigmented skin lesion with irregular border approx 3 mm. Nose: Nose normal. No congestion or rhinorrhea. Eyes:      General: No scleral icterus. Right eye: No discharge. Left eye: No discharge. Extraocular Movements: Extraocular movements intact. Conjunctiva/sclera: Conjunctivae normal.      Pupils: Pupils are equal, round, and reactive to light. Neck:      Vascular: No carotid bruit. Cardiovascular:      Rate and Rhythm: Normal rate and regular rhythm. Pulses: Normal pulses. Heart sounds: Normal heart sounds. No murmur heard. No friction rub. No gallop.    Pulmonary: Effort: Pulmonary effort is normal. No respiratory distress. Breath sounds: Normal breath sounds. No stridor. No wheezing, rhonchi or rales. Chest:      Chest wall: No tenderness. Abdominal:      General: Abdomen is flat. Bowel sounds are normal. There is no distension. Palpations: Abdomen is soft. There is no mass. Tenderness: There is no abdominal tenderness. There is no right CVA tenderness, left CVA tenderness, guarding or rebound. Negative signs include Raines's sign, Rovsing's sign, McBurney's sign, psoas sign and obturator sign. Hernia: No hernia is present. Musculoskeletal:         General: Normal range of motion. Cervical back: Normal range of motion and neck supple. No rigidity or tenderness. Lymphadenopathy:      Cervical: No cervical adenopathy. Skin:     General: Skin is warm and dry. Capillary Refill: Capillary refill takes less than 2 seconds. Findings: No rash. Comments: See Ears Section    Right plantar surface medial aspect  of arch with a slightly erythematous macular circular rash approx 1 in in diameter   Neurological:      General: No focal deficit present. Mental Status: He is alert and oriented to person, place, and time. Psychiatric:         Mood and Affect: Mood normal.         Behavior: Behavior normal.         Thought Content: Thought content normal.         Judgment: Judgment normal.         Medical problems and test results were reviewed with the patient today.    Lab Results   Component Value Date     01/23/2023    K 4.5 01/23/2023     01/23/2023    CO2 26 01/23/2023    BUN 15 01/23/2023    CREATININE 1.10 01/23/2023    GLUCOSE 133 (H) 01/23/2023    CALCIUM 9.3 01/23/2023    PROT 7.0 01/23/2023    LABALBU 4.1 01/23/2023    BILITOT 0.6 01/23/2023    ALKPHOS 57 01/23/2023    AST 44 (H) 01/23/2023    ALT 77 (H) 01/23/2023    LABGLOM >60 01/23/2023    GFRAA >60 07/18/2022    AGRATIO 1.6 02/21/2022    GLOB 2.9 01/23/2023 Hemoglobin A1C   Date Value Ref Range Status   01/23/2023 6.4 (H) 4.8 - 5.6 % Final     Lab Results   Component Value Date    WBC 7.9 01/05/2023    HGB 16.9 01/05/2023    HCT 50.6 (H) 01/05/2023    MCV 95.0 01/23/2023     01/05/2023     Lab Results   Component Value Date    CHOL 167 01/23/2023    CHOL 155 07/18/2022    CHOL 137 06/17/2022     Lab Results   Component Value Date    TRIG 260 (H) 01/23/2023    TRIG 182 (H) 07/18/2022    TRIG 193 (H) 06/17/2022     Lab Results   Component Value Date    HDL 30 (L) 01/23/2023    HDL 31 (L) 07/18/2022    HDL 28 (L) 06/17/2022     Lab Results   Component Value Date    LDLCALC 85 01/23/2023    LDLCALC 87.6 07/18/2022    LDLCALC 76 06/17/2022     Lab Results   Component Value Date    LABVLDL 52 (H) 01/23/2023    LABVLDL 36.4 (H) 07/18/2022    LABVLDL 56 (H) 08/17/2020    VLDL 33 06/17/2022    VLDL 42 (H) 02/21/2022    VLDL 31 08/05/2021     Lab Results   Component Value Date    CHOLHDLRATIO 5.6 01/23/2023    CHOLHDLRATIO 5.0 07/18/2022     Lab Results   Component Value Date/Time    VITD25 32.7 01/23/2023 09:15 AM        ASSESSMENT and PLAN    1. Controlled type 2 diabetes mellitus with neuropathy (HCC)  -     amitriptyline (ELAVIL) 50 MG tablet; Take 1 tablet by mouth nightly For sleep/neuropathy, Disp-90 tablet, R-3Normal  -     Dapagliflozin-metFORMIN HCl ER (XIGDUO XR) 5-1000 MG TB24; Take 1 tablet by mouth daily With food for diabetes, Disp-90 tablet, R-3Normal    A1c remains stable at 6.4. We will have patient continue with Xigduo once daily. Patient to also continue amitriptyline 50 mg at bedtime for sleep and neuropathy management. We will recheck labs in 6 months. Advised patient on importance of annual retinopathy examination. 2. Pure hypercholesterolemia  -     atorvastatin (LIPITOR) 80 MG tablet; Take 1 tablet by mouth nightly For cholesterol, Disp-90 tablet, R-3Normal    LDL is 85. Not quite at goal of 70 and under for patient with diabetes. Also his triglyceride is elevated at 260. Per chart review, patient is noted to have a slightly elevated triglyceride level despite taking fenofibrate 145 mg daily. We will increase his fenofibrate to 160 mg at this time and have patient continue on his atorvastatin 80 mg once daily and we will recheck fasting labs in 3 to 6 months. Advised patient on importance of low-fat, low-cholesterol diet. 3. Essential hypertension, benign  -     atenolol (TENORMIN) 25 MG tablet; Take 1 tablet by mouth daily For blood pressure, Disp-90 tablet, R-3Normal    Blood pressure today is at goal at 136/74. Continue atenolol daily. 4. Elevated liver function tests  -     Amb External Referral To Hepatology    Patient with a history of slightly elevated liver enzymes ALT and AST for several years. Patient has had a negative acute hepatitis panel. Recently had CT of abdomen and pelvis in December for multiple kidney stones. Has not been seen by GI in the past for evaluation. Patient is also noted to be due for a repeat colonoscopy for colon cancer screening. We will place [facility]. Patient was also given the contact information and advised to call to schedule an appointment. 5. Colon cancer screening  -     Amb External Referral To Hepatology    As above. 6. Vitamin D deficiency   Stable. Continue current supplementation. 7. Arthritis  -     celecoxib (CELEBREX) 200 MG capsule; Take 1 capsule by mouth 2 times daily With food for pain, Disp-180 capsule, R-3Normal    Stable. Creatinine level normal.  Continue current therapy. Advised patient take medication with food and to ensure hydration. 8. Skin lesion of face  -     External Referral to Dermatology    Patient with 2 skin lesion on face 1 to right temporal area that is concerning for malignancy and possible melanoma.   Also he has a skin lesion to right ear that is consistent in appearance for basal cell carcinoma. Patient does work for [employer] and he works in [employer] and is outside constantly. He is at high risk for skin cancer. Urgent referral placed to advanced dermatology for consideration. 9. Tinea pedis of both feet  -     clotrimazole-betamethasone (LOTRISONE) 1-0.05 % cream; Apply to 2 times per day for fungal treatment (athlete's foot), Disp-45 g, R-5, Normal    Rash to right plantar surface at arch noted. Patient reports pruritus. He reports having tinea infection regularly due to his line of work. We will refill Lotrisone topical.  Advised patient on avoidance of continuous daily use more than 10 days due to risk of hypopigmentation and thinning of the skin from steroidal cream use. 10. KATERINA on CPAP  -     [address], [address] [facility]    Patient is currently on CPAP therapy for treatment for over 10 years. Currently not established with sleep medicine. He reports great results with current use of machine. He is inquiring in regards to the inspire procedure. We will place referral to sleep medicine for consideration. 11. Special screening for malignant neoplasm of prostate  -     PSA Screening;  Future    Orders Placed This Encounter   Procedures    PSA Screening     Standing Status:   Future     Standing Expiration Date:   1/30/2024    Amb External Referral To Hepatology     Referral Priority:   Routine     Referral Reason:   Specialty Services Required     [facility] Location[facility]   [facility]     Requested Specialty:   Gastroenterology     Number of Visits Requested:   1    [address] [place] [address] Sleep Medicine, [address] [facility]     Referral Priority:   Routine     Referral Type:   Eval and Treat     Referral Reason:   Specialty Services Required     Number of Visits Requested:   1    External Referral to Dermatology     Referral Priority:   Urgent     Referral Type:   Eval and Treat     Referral Reason:   Specialty Services Required     Requested Specialty: Dermatology     Number of Visits Requested:   1         Elements of this note have been dictated using speech recognition software. As a result, errors of speech recognition may have occurred. On this date 1/30/2023 I have spent 50 minutes reviewing previous notes, test results and face to face with the patient discussing the diagnosis and importance of compliance with the treatment plan as well as documenting on the day of the visit. Greater than 50% of this visit was spent counseling the patient about test results, prognosis, importance of compliance, education about disease process, benefits of medications, instructions for management of acute symptoms, and follow up plans. Return in about 6 months (around 7/30/2023) for Follow up with fasting labs prior.      Edwin Martinez, APRN - CNP

## 2023-02-01 NOTE — PERIOP NOTE
Patient verified name and . Order for consent  found in EHR and matches case posting; patient verifies procedure. Type 2 surgery, phone assessment complete. Orders  received. Labs per surgeon: PAT- CBC with diff, UA, and urine culture- patient stated he is coming to [address] [place] [address] at 10 am on 23 per doctor office instructions  Labs per anesthesia protocol: SQBS s/h for DOS. BMP 23  EKG: to be obtained 23    Patient answered medical/surgical history questions at their best of ability. All prior to admission medications documented in [facility]. Patient instructed to take the following medications the day of surgery according to anesthesia guidelines with a small sip of water: omeprazole if needed. On the day before surgery please take Acetaminophen 1000mg in the morning and then again before bed. You may substitute for Tylenol 650 mg. Hold all vitamins 7 days prior to surgery and NSAIDS  (preventative aspirin 81 mg) 5 days prior to surgery. Prescription meds to hold: celebrex hold 5 days prior to surgery    Patient instructed on the following:    > Arrive at main Entrance, time of arrival to be called the day before by 1700  > NPO after midnight, unless otherwise indicated, including gum, mints, and ice chips  > Responsible adult must drive patient to the hospital, stay during surgery, and patient will need supervision 24 hours after anesthesia  > Use antibacterial soap in shower the night before surgery and on the morning of surgery  > All piercings must be removed prior to arrival.    > Leave all valuables (money and jewelry) at home but bring insurance card and ID on DOS.   > You may be required to pay a deductible or co-pay on the day of your procedure. You can pre-pay by calling [phone] if your surgery is at the [facility] or [phone] if your surgery is at the [facility].   > Do not wear make-up, nail polish, lotions, cologne, perfumes, powders, or oil on skin.      Patient verbalized understanding

## 2023-02-02 ENCOUNTER — HOSPITAL ENCOUNTER (OUTPATIENT)
Dept: PREADMISSION TESTING | Age: 58
Discharge: HOME | End: 2023-02-02
Payer: COMMERCIAL

## 2023-02-02 DIAGNOSIS — N20.0 NEPHROLITHIASIS: ICD-10-CM

## 2023-02-02 LAB
APPEARANCE UR: CLEAR
BACTERIA URNS QL MICRO: NEGATIVE /HPF
BASOPHILS # BLD: 0.1 K/UL (ref 0–0.2)
BASOPHILS NFR BLD: 1 % (ref 0–2)
BILIRUB UR QL: NEGATIVE
CASTS URNS QL MICRO: ABNORMAL /LPF
COLOR UR: ABNORMAL
DIFFERENTIAL METHOD BLD: ABNORMAL
EKG ATRIAL RATE: 71 BPM
EKG DIAGNOSIS: NORMAL
EKG P AXIS: 50 DEGREES
EKG P-R INTERVAL: 166 MS
EKG Q-T INTERVAL: 398 MS
EKG QRS DURATION: 92 MS
EKG QTC CALCULATION (BAZETT): 432 MS
EKG R AXIS: -22 DEGREES
EKG T AXIS: -26 DEGREES
EKG VENTRICULAR RATE: 71 BPM
EOSINOPHIL # BLD: 0.3 K/UL (ref 0–0.8)
EOSINOPHIL NFR BLD: 4 % (ref 0.5–7.8)
EPI CELLS #/AREA URNS HPF: ABNORMAL /HPF
ERYTHROCYTE [DISTWIDTH] IN BLOOD BY AUTOMATED COUNT: 13.2 % (ref 11.9–14.6)
GLUCOSE UR STRIP.AUTO-MCNC: >1000 MG/DL
HCT VFR BLD AUTO: 48.6 % (ref 41.1–50.3)
HGB BLD-MCNC: 16.4 G/DL (ref 13.6–17.2)
HGB UR QL STRIP: ABNORMAL
IMM GRANULOCYTES # BLD AUTO: 0 K/UL (ref 0–0.5)
IMM GRANULOCYTES NFR BLD AUTO: 0 % (ref 0–5)
KETONES UR QL STRIP.AUTO: NEGATIVE MG/DL
LEUKOCYTE ESTERASE UR QL STRIP.AUTO: NEGATIVE
LYMPHOCYTES # BLD: 2.5 K/UL (ref 0.5–4.6)
LYMPHOCYTES NFR BLD: 35 % (ref 13–44)
MCH RBC QN AUTO: 29.9 PG (ref 26.1–32.9)
MCHC RBC AUTO-ENTMCNC: 33.7 G/DL (ref 31.4–35)
MCV RBC AUTO: 88.5 FL (ref 82–102)
MONOCYTES # BLD: 0.5 K/UL (ref 0.1–1.3)
MONOCYTES NFR BLD: 7 % (ref 4–12)
NEUTS SEG # BLD: 3.9 K/UL (ref 1.7–8.2)
NEUTS SEG NFR BLD: 53 % (ref 43–78)
NITRITE UR QL STRIP.AUTO: NEGATIVE
NRBC # BLD: 0 K/UL (ref 0–0.2)
PH UR STRIP: 5 (ref 5–9)
PLATELET # BLD AUTO: 288 K/UL (ref 150–450)
PMV BLD AUTO: 9.1 FL (ref 9.4–12.3)
PROT UR STRIP-MCNC: NEGATIVE MG/DL
RBC # BLD AUTO: 5.49 M/UL (ref 4.23–5.6)
RBC #/AREA URNS HPF: ABNORMAL /HPF
SP GR UR REFRACTOMETRY: 1.03 (ref 1–1.02)
UROBILINOGEN UR QL STRIP.AUTO: 0.2 EU/DL (ref 0.2–1)
WBC # BLD AUTO: 7.3 K/UL (ref 4.3–11.1)
WBC URNS QL MICRO: ABNORMAL /HPF

## 2023-02-02 PROCEDURE — 93005 ELECTROCARDIOGRAM TRACING: CPT | Performed by: ANESTHESIOLOGY

## 2023-02-02 PROCEDURE — 81001 URINALYSIS AUTO W/SCOPE: CPT

## 2023-02-02 PROCEDURE — 85025 COMPLETE CBC W/AUTO DIFF WBC: CPT

## 2023-02-02 PROCEDURE — 87086 URINE CULTURE/COLONY COUNT: CPT

## 2023-02-02 NOTE — PERIOP NOTE
Latest Reference Range & Units 2/2/23 10:21   WBC 4.3 - 11.1 K/uL 7.3   RBC 4.23 - 5.6 M/uL 5.49   Hemoglobin Quant 13.6 - 17.2 g/dL 16.4   Hematocrit 41.1 - 50.3 % 48.6   MCV 82.0 - 102.0 FL 88.5   MCH 26.1 - 32.9 PG 29.9   MCHC 31.4 - 35.0 g/dL 33.7   MPV 9.4 - 12.3 FL 9.1 (L)   RDW 11.9 - 14.6 % 13.2   Platelet Count 430 - 450 K/uL 288   Absolute Mono # 0.1 - 1.3 K/UL 0.5   Eosinophils % 0.5 - 7.8 % 4   Basophils Absolute 0.0 - 0.2 K/UL 0.1   Differential Type -   AUTOMATED   Seg Neutrophils 43 - 78 % 53   Segs Absolute 1.7 - 8.2 K/UL 3.9   Lymphocytes 13 - 44 % 35   Absolute Lymph # 0.5 - 4.6 K/UL 2.5   Monocytes 4.0 - 12.0 % 7   Absolute Eos # 0.0 - 0.8 K/UL 0.3   Basophils 0.0 - 2.0 % 1   Immature Granulocytes 0.0 - 5.0 % 0   Nucleated Red Blood Cells 0.0 - 0.2 K/uL 0.00   Absolute Immature Granulocyte 0.0 - 0.5 K/UL 0.0   (L): Data is abnormally low   Latest Reference Range & Units 2/2/23 10:14   Color, UA -   YELLOW/STRAW   Glucose, UA mg/dL >1000   Bilirubin, Urine NEG   Negative   Ketones, Urine NEG mg/dL Negative   Specific Gravity, UA 1.001 - 1.023   1.032 (H)   Blood, Urine NEG   TRACE !    Protein, UA NEG mg/dL Negative   Urobilinogen, Urine 0.2 - 1.0 EU/dL 0.2   Nitrite, Urine NEG   Negative   Leukocyte Esterase, Urine NEG   Negative   Appearance -   CLEAR   pH, Urine 5.0 - 9.0   5.0   Casts U2 /lpf 0-2   WBC, UA U4 /hpf 0-4   RBC, UA U5 /hpf 0-5   Epithelial Cells, UA U5 /hpf 0-5   Bacteria, UA NEG /hpf Negative   (H): Data is abnormally high  !: Data is abnormal   Latest Reference Range & Units 2/2/23 10:14   Appearance -   CLEAR     Labs reviewed and routed to Dr. Breonna Fajardo

## 2023-02-04 LAB
BACTERIA SPEC CULT: NORMAL
SERVICE CMNT-IMP: NORMAL

## 2023-02-09 ENCOUNTER — ANESTHESIA (OUTPATIENT)
Dept: SURGERY | Age: 58
End: 2023-02-09
Payer: COMMERCIAL

## 2023-02-09 ENCOUNTER — ANESTHESIA EVENT (OUTPATIENT)
Dept: SURGERY | Age: 58
End: 2023-02-09
Payer: COMMERCIAL

## 2023-02-09 ENCOUNTER — APPOINTMENT (OUTPATIENT)
Dept: GENERAL RADIOLOGY | Age: 58
End: 2023-02-09
Attending: UROLOGY
Payer: COMMERCIAL

## 2023-02-09 ENCOUNTER — HOSPITAL ENCOUNTER (OUTPATIENT)
Age: 58
Setting detail: OBSERVATION
Discharge: HOME | End: 2023-02-11
Attending: UROLOGY | Admitting: UROLOGY
Payer: COMMERCIAL

## 2023-02-09 ENCOUNTER — HOSPITAL ENCOUNTER (OUTPATIENT)
Dept: INTERVENTIONAL RADIOLOGY/VASCULAR | Age: 58
End: 2023-02-09
Payer: COMMERCIAL

## 2023-02-09 VITALS
SYSTOLIC BLOOD PRESSURE: 123 MMHG | WEIGHT: 227 LBS | DIASTOLIC BLOOD PRESSURE: 70 MMHG | HEIGHT: 70 IN | OXYGEN SATURATION: 96 % | BODY MASS INDEX: 32.5 KG/M2 | HEART RATE: 67 BPM | TEMPERATURE: 97.9 F | RESPIRATION RATE: 16 BRPM

## 2023-02-09 DIAGNOSIS — N20.0 KIDNEY STONE: Primary | ICD-10-CM

## 2023-02-09 DIAGNOSIS — N20.0 KIDNEY STONE: ICD-10-CM

## 2023-02-09 LAB
APTT PPP: 25.8 SEC (ref 24.5–34.2)
GLUCOSE BLD STRIP.AUTO-MCNC: 118 MG/DL (ref 65–100)
GLUCOSE BLD STRIP.AUTO-MCNC: 122 MG/DL (ref 65–100)
INR BLD: 1.1 (ref 0.9–1.2)
INR PPP: 1
PROTHROMBIN TIME: 13.8 SEC (ref 12.6–14.3)
PT BLD: 13 SECS (ref 9.6–11.6)
SERVICE CMNT-IMP: ABNORMAL
SERVICE CMNT-IMP: ABNORMAL

## 2023-02-09 PROCEDURE — C1769 GUIDE WIRE: HCPCS

## 2023-02-09 PROCEDURE — 6360000002 HC RX W HCPCS: Performed by: RADIOLOGY

## 2023-02-09 PROCEDURE — 3600000014 HC SURGERY LEVEL 4 ADDTL 15MIN: Performed by: UROLOGY

## 2023-02-09 PROCEDURE — 6360000002 HC RX W HCPCS: Performed by: NURSE ANESTHETIST, CERTIFIED REGISTERED

## 2023-02-09 PROCEDURE — 6360000002 HC RX W HCPCS: Performed by: UROLOGY

## 2023-02-09 PROCEDURE — 7100000001 HC PACU RECOVERY - ADDTL 15 MIN: Performed by: UROLOGY

## 2023-02-09 PROCEDURE — 6370000000 HC RX 637 (ALT 250 FOR IP): Performed by: ANESTHESIOLOGY

## 2023-02-09 PROCEDURE — C1894 INTRO/SHEATH, NON-LASER: HCPCS | Performed by: UROLOGY

## 2023-02-09 PROCEDURE — C1769 GUIDE WIRE: HCPCS | Performed by: UROLOGY

## 2023-02-09 PROCEDURE — 82360 CALCULUS ASSAY QUANT: CPT

## 2023-02-09 PROCEDURE — 7100000000 HC PACU RECOVERY - FIRST 15 MIN: Performed by: UROLOGY

## 2023-02-09 PROCEDURE — 6370000000 HC RX 637 (ALT 250 FOR IP): Performed by: UROLOGY

## 2023-02-09 PROCEDURE — C1726 CATH, BAL DIL, NON-VASCULAR: HCPCS | Performed by: UROLOGY

## 2023-02-09 PROCEDURE — 6360000002 HC RX W HCPCS: Performed by: ANESTHESIOLOGY

## 2023-02-09 PROCEDURE — 6360000004 HC RX CONTRAST MEDICATION: Performed by: RADIOLOGY

## 2023-02-09 PROCEDURE — G0378 HOSPITAL OBSERVATION PER HR: HCPCS

## 2023-02-09 PROCEDURE — 3600000004 HC SURGERY LEVEL 4 BASE: Performed by: UROLOGY

## 2023-02-09 PROCEDURE — 3700000000 HC ANESTHESIA ATTENDED CARE: Performed by: UROLOGY

## 2023-02-09 PROCEDURE — C2617 STENT, NON-COR, TEM W/O DEL: HCPCS | Performed by: UROLOGY

## 2023-02-09 PROCEDURE — 2500000003 HC RX 250 WO HCPCS: Performed by: NURSE ANESTHETIST, CERTIFIED REGISTERED

## 2023-02-09 PROCEDURE — 2709999900 HC NON-CHARGEABLE SUPPLY: Performed by: UROLOGY

## 2023-02-09 PROCEDURE — 85730 THROMBOPLASTIN TIME PARTIAL: CPT

## 2023-02-09 PROCEDURE — 2580000003 HC RX 258: Performed by: ANESTHESIOLOGY

## 2023-02-09 PROCEDURE — 2720000010 HC SURG SUPPLY STERILE: Performed by: UROLOGY

## 2023-02-09 PROCEDURE — 2580000003 HC RX 258: Performed by: UROLOGY

## 2023-02-09 PROCEDURE — 3700000001 HC ADD 15 MINUTES (ANESTHESIA): Performed by: UROLOGY

## 2023-02-09 PROCEDURE — 85610 PROTHROMBIN TIME: CPT

## 2023-02-09 PROCEDURE — 6360000002 HC RX W HCPCS: Performed by: NURSE PRACTITIONER

## 2023-02-09 PROCEDURE — 82962 GLUCOSE BLOOD TEST: CPT

## 2023-02-09 PROCEDURE — 94660 CPAP INITIATION&MGMT: CPT

## 2023-02-09 PROCEDURE — 6360000004 HC RX CONTRAST MEDICATION: Performed by: UROLOGY

## 2023-02-09 DEVICE — URETERAL STENT
Type: IMPLANTABLE DEVICE | Status: FUNCTIONAL
Brand: PERCUFLEX™ PLUS

## 2023-02-09 RX ORDER — CIPROFLOXACIN 2 MG/ML
400 INJECTION, SOLUTION INTRAVENOUS
Status: COMPLETED | OUTPATIENT
Start: 2023-02-09 | End: 2023-02-09

## 2023-02-09 RX ORDER — FENTANYL CITRATE 50 UG/ML
INJECTION, SOLUTION INTRAMUSCULAR; INTRAVENOUS
Status: COMPLETED | OUTPATIENT
Start: 2023-02-09 | End: 2023-02-09

## 2023-02-09 RX ORDER — ASPIRIN 81 MG/1
81 TABLET ORAL NIGHTLY
Status: DISCONTINUED | OUTPATIENT
Start: 2023-02-09 | End: 2023-02-11 | Stop reason: HOSPADM

## 2023-02-09 RX ORDER — LIDOCAINE HYDROCHLORIDE 20 MG/ML
INJECTION, SOLUTION EPIDURAL; INFILTRATION; INTRACAUDAL; PERINEURAL PRN
Status: DISCONTINUED | OUTPATIENT
Start: 2023-02-09 | End: 2023-02-09 | Stop reason: SDUPTHER

## 2023-02-09 RX ORDER — OXYCODONE HYDROCHLORIDE 5 MG/1
5 TABLET ORAL EVERY 4 HOURS PRN
Status: DISCONTINUED | OUTPATIENT
Start: 2023-02-09 | End: 2023-02-11 | Stop reason: HOSPADM

## 2023-02-09 RX ORDER — HYDROMORPHONE HCL 110MG/55ML
0.5 PATIENT CONTROLLED ANALGESIA SYRINGE INTRAVENOUS EVERY 5 MIN PRN
Status: DISCONTINUED | OUTPATIENT
Start: 2023-02-09 | End: 2023-02-09 | Stop reason: SDUPTHER

## 2023-02-09 RX ORDER — LIDOCAINE HYDROCHLORIDE 10 MG/ML
1 INJECTION, SOLUTION INFILTRATION; PERINEURAL
Status: DISCONTINUED | OUTPATIENT
Start: 2023-02-09 | End: 2023-02-09 | Stop reason: HOSPADM

## 2023-02-09 RX ORDER — MIDAZOLAM HYDROCHLORIDE 2 MG/2ML
INJECTION, SOLUTION INTRAMUSCULAR; INTRAVENOUS
Status: COMPLETED | OUTPATIENT
Start: 2023-02-09 | End: 2023-02-09

## 2023-02-09 RX ORDER — SODIUM CHLORIDE, SODIUM LACTATE, POTASSIUM CHLORIDE, CALCIUM CHLORIDE 600; 310; 30; 20 MG/100ML; MG/100ML; MG/100ML; MG/100ML
INJECTION, SOLUTION INTRAVENOUS CONTINUOUS
Status: DISCONTINUED | OUTPATIENT
Start: 2023-02-09 | End: 2023-02-09

## 2023-02-09 RX ORDER — ATORVASTATIN CALCIUM 40 MG/1
80 TABLET, FILM COATED ORAL NIGHTLY
Status: DISCONTINUED | OUTPATIENT
Start: 2023-02-09 | End: 2023-02-11 | Stop reason: HOSPADM

## 2023-02-09 RX ORDER — ACETAMINOPHEN 325 MG/1
650 TABLET ORAL EVERY 4 HOURS PRN
Status: DISCONTINUED | OUTPATIENT
Start: 2023-02-09 | End: 2023-02-11 | Stop reason: HOSPADM

## 2023-02-09 RX ORDER — OXYCODONE HYDROCHLORIDE 5 MG/1
5 TABLET ORAL
Status: DISCONTINUED | OUTPATIENT
Start: 2023-02-09 | End: 2023-02-09 | Stop reason: HOSPADM

## 2023-02-09 RX ORDER — HYDROMORPHONE HCL 110MG/55ML
PATIENT CONTROLLED ANALGESIA SYRINGE INTRAVENOUS PRN
Status: DISCONTINUED | OUTPATIENT
Start: 2023-02-09 | End: 2023-02-09 | Stop reason: SDUPTHER

## 2023-02-09 RX ORDER — SODIUM CHLORIDE, SODIUM LACTATE, POTASSIUM CHLORIDE, CALCIUM CHLORIDE 600; 310; 30; 20 MG/100ML; MG/100ML; MG/100ML; MG/100ML
INJECTION, SOLUTION INTRAVENOUS CONTINUOUS
Status: DISCONTINUED | OUTPATIENT
Start: 2023-02-09 | End: 2023-02-09 | Stop reason: HOSPADM

## 2023-02-09 RX ORDER — HYDROMORPHONE HYDROCHLORIDE 1 MG/ML
0.5 INJECTION, SOLUTION INTRAMUSCULAR; INTRAVENOUS; SUBCUTANEOUS
Status: DISCONTINUED | OUTPATIENT
Start: 2023-02-09 | End: 2023-02-11 | Stop reason: HOSPADM

## 2023-02-09 RX ORDER — AMITRIPTYLINE HYDROCHLORIDE 50 MG/1
50 TABLET, FILM COATED ORAL NIGHTLY
Status: DISCONTINUED | OUTPATIENT
Start: 2023-02-09 | End: 2023-02-11 | Stop reason: HOSPADM

## 2023-02-09 RX ORDER — FENOFIBRATE 160 MG/1
160 TABLET ORAL NIGHTLY
Status: DISCONTINUED | OUTPATIENT
Start: 2023-02-09 | End: 2023-02-11 | Stop reason: HOSPADM

## 2023-02-09 RX ORDER — NEOSTIGMINE METHYLSULFATE 1 MG/ML
INJECTION, SOLUTION INTRAVENOUS PRN
Status: DISCONTINUED | OUTPATIENT
Start: 2023-02-09 | End: 2023-02-09 | Stop reason: SDUPTHER

## 2023-02-09 RX ORDER — MEPERIDINE HYDROCHLORIDE 25 MG/ML
12.5 INJECTION INTRAMUSCULAR; INTRAVENOUS; SUBCUTANEOUS EVERY 5 MIN PRN
Status: DISCONTINUED | OUTPATIENT
Start: 2023-02-09 | End: 2023-02-09 | Stop reason: HOSPADM

## 2023-02-09 RX ORDER — PANTOPRAZOLE SODIUM 40 MG/1
40 TABLET, DELAYED RELEASE ORAL
Status: DISCONTINUED | OUTPATIENT
Start: 2023-02-10 | End: 2023-02-11 | Stop reason: HOSPADM

## 2023-02-09 RX ORDER — MIDAZOLAM HYDROCHLORIDE 2 MG/2ML
2 INJECTION, SOLUTION INTRAMUSCULAR; INTRAVENOUS
Status: DISCONTINUED | OUTPATIENT
Start: 2023-02-09 | End: 2023-02-09 | Stop reason: HOSPADM

## 2023-02-09 RX ORDER — ONDANSETRON 2 MG/ML
4 INJECTION INTRAMUSCULAR; INTRAVENOUS EVERY 6 HOURS PRN
Status: DISCONTINUED | OUTPATIENT
Start: 2023-02-09 | End: 2023-02-11 | Stop reason: HOSPADM

## 2023-02-09 RX ORDER — SODIUM CHLORIDE 0.9 % (FLUSH) 0.9 %
5-40 SYRINGE (ML) INJECTION PRN
Status: DISCONTINUED | OUTPATIENT
Start: 2023-02-09 | End: 2023-02-09 | Stop reason: HOSPADM

## 2023-02-09 RX ORDER — ACETAMINOPHEN 500 MG
1000 TABLET ORAL ONCE
Status: COMPLETED | OUTPATIENT
Start: 2023-02-09 | End: 2023-02-09

## 2023-02-09 RX ORDER — ROCURONIUM BROMIDE 10 MG/ML
INJECTION, SOLUTION INTRAVENOUS PRN
Status: DISCONTINUED | OUTPATIENT
Start: 2023-02-09 | End: 2023-02-09 | Stop reason: SDUPTHER

## 2023-02-09 RX ORDER — PROCHLORPERAZINE EDISYLATE 5 MG/ML
5 INJECTION INTRAMUSCULAR; INTRAVENOUS
Status: DISCONTINUED | OUTPATIENT
Start: 2023-02-09 | End: 2023-02-09 | Stop reason: HOSPADM

## 2023-02-09 RX ORDER — PROPOFOL 10 MG/ML
INJECTION, EMULSION INTRAVENOUS PRN
Status: DISCONTINUED | OUTPATIENT
Start: 2023-02-09 | End: 2023-02-09 | Stop reason: SDUPTHER

## 2023-02-09 RX ORDER — GLYCOPYRROLATE 0.2 MG/ML
INJECTION INTRAMUSCULAR; INTRAVENOUS PRN
Status: DISCONTINUED | OUTPATIENT
Start: 2023-02-09 | End: 2023-02-09 | Stop reason: SDUPTHER

## 2023-02-09 RX ORDER — SODIUM CHLORIDE 9 MG/ML
INJECTION, SOLUTION INTRAVENOUS PRN
Status: DISCONTINUED | OUTPATIENT
Start: 2023-02-09 | End: 2023-02-09 | Stop reason: HOSPADM

## 2023-02-09 RX ORDER — FENTANYL CITRATE 50 UG/ML
INJECTION, SOLUTION INTRAMUSCULAR; INTRAVENOUS PRN
Status: DISCONTINUED | OUTPATIENT
Start: 2023-02-09 | End: 2023-02-09 | Stop reason: SDUPTHER

## 2023-02-09 RX ORDER — SODIUM CHLORIDE 0.9 % (FLUSH) 0.9 %
5-40 SYRINGE (ML) INJECTION EVERY 12 HOURS SCHEDULED
Status: DISCONTINUED | OUTPATIENT
Start: 2023-02-09 | End: 2023-02-09 | Stop reason: HOSPADM

## 2023-02-09 RX ORDER — ONDANSETRON 2 MG/ML
4 INJECTION INTRAMUSCULAR; INTRAVENOUS
Status: DISCONTINUED | OUTPATIENT
Start: 2023-02-09 | End: 2023-02-09 | Stop reason: HOSPADM

## 2023-02-09 RX ORDER — ATENOLOL 50 MG/1
25 TABLET ORAL NIGHTLY
Status: DISCONTINUED | OUTPATIENT
Start: 2023-02-09 | End: 2023-02-11 | Stop reason: HOSPADM

## 2023-02-09 RX ORDER — ONDANSETRON 2 MG/ML
INJECTION INTRAMUSCULAR; INTRAVENOUS PRN
Status: DISCONTINUED | OUTPATIENT
Start: 2023-02-09 | End: 2023-02-09 | Stop reason: SDUPTHER

## 2023-02-09 RX ORDER — OXYCODONE HYDROCHLORIDE 5 MG/1
10 TABLET ORAL EVERY 4 HOURS PRN
Status: DISCONTINUED | OUTPATIENT
Start: 2023-02-09 | End: 2023-02-11 | Stop reason: HOSPADM

## 2023-02-09 RX ORDER — SODIUM CHLORIDE 9 MG/ML
INJECTION, SOLUTION INTRAVENOUS CONTINUOUS
Status: DISCONTINUED | OUTPATIENT
Start: 2023-02-09 | End: 2023-02-11

## 2023-02-09 RX ORDER — INSULIN LISPRO 100 [IU]/ML
0-4 INJECTION, SOLUTION INTRAVENOUS; SUBCUTANEOUS NIGHTLY
Status: DISCONTINUED | OUTPATIENT
Start: 2023-02-09 | End: 2023-02-11 | Stop reason: HOSPADM

## 2023-02-09 RX ORDER — HYDROMORPHONE HCL 110MG/55ML
0.5 PATIENT CONTROLLED ANALGESIA SYRINGE INTRAVENOUS EVERY 5 MIN PRN
Status: DISCONTINUED | OUTPATIENT
Start: 2023-02-09 | End: 2023-02-09 | Stop reason: HOSPADM

## 2023-02-09 RX ORDER — INSULIN LISPRO 100 [IU]/ML
0-8 INJECTION, SOLUTION INTRAVENOUS; SUBCUTANEOUS
Status: DISCONTINUED | OUTPATIENT
Start: 2023-02-10 | End: 2023-02-11 | Stop reason: HOSPADM

## 2023-02-09 RX ORDER — DEXTROSE MONOHYDRATE 100 MG/ML
INJECTION, SOLUTION INTRAVENOUS CONTINUOUS PRN
Status: DISCONTINUED | OUTPATIENT
Start: 2023-02-09 | End: 2023-02-11 | Stop reason: HOSPADM

## 2023-02-09 RX ORDER — PHENAZOPYRIDINE HYDROCHLORIDE 95 MG/1
95 TABLET ORAL ONCE
Status: COMPLETED | OUTPATIENT
Start: 2023-02-09 | End: 2023-02-09

## 2023-02-09 RX ADMIN — FENTANYL CITRATE 100 MCG: 50 INJECTION, SOLUTION INTRAMUSCULAR; INTRAVENOUS at 16:43

## 2023-02-09 RX ADMIN — ONDANSETRON 4 MG: 2 INJECTION INTRAMUSCULAR; INTRAVENOUS at 17:51

## 2023-02-09 RX ADMIN — HYDROMORPHONE HYDROCHLORIDE 0.4 MG: 2 INJECTION INTRAMUSCULAR; INTRAVENOUS; SUBCUTANEOUS at 17:28

## 2023-02-09 RX ADMIN — LIDOCAINE HYDROCHLORIDE 100 MG: 20 INJECTION, SOLUTION EPIDURAL; INFILTRATION; INTRACAUDAL; PERINEURAL at 16:43

## 2023-02-09 RX ADMIN — ROCURONIUM BROMIDE 40 MG: 50 INJECTION, SOLUTION INTRAVENOUS at 16:54

## 2023-02-09 RX ADMIN — PROPOFOL 50 MG: 10 INJECTION, EMULSION INTRAVENOUS at 16:53

## 2023-02-09 RX ADMIN — FENTANYL CITRATE 50 MCG: 50 INJECTION, SOLUTION INTRAMUSCULAR; INTRAVENOUS at 13:22

## 2023-02-09 RX ADMIN — ROCURONIUM BROMIDE 10 MG: 50 INJECTION, SOLUTION INTRAVENOUS at 16:43

## 2023-02-09 RX ADMIN — IOHEXOL 10 ML: 300 INJECTION, SOLUTION INTRAVENOUS at 13:31

## 2023-02-09 RX ADMIN — AMITRIPTYLINE HYDROCHLORIDE 50 MG: 50 TABLET, FILM COATED ORAL at 21:38

## 2023-02-09 RX ADMIN — SODIUM CHLORIDE, POTASSIUM CHLORIDE, SODIUM LACTATE AND CALCIUM CHLORIDE: 600; 310; 30; 20 INJECTION, SOLUTION INTRAVENOUS at 11:56

## 2023-02-09 RX ADMIN — PHENAZOPYRIDINE HYDROCHLORIDE 95 MG: 95 TABLET ORAL at 19:51

## 2023-02-09 RX ADMIN — GLYCOPYRROLATE 0.8 MG: 0.2 INJECTION INTRAMUSCULAR; INTRAVENOUS at 17:44

## 2023-02-09 RX ADMIN — HYDROMORPHONE HYDROCHLORIDE 0.5 MG: 1 INJECTION, SOLUTION INTRAMUSCULAR; INTRAVENOUS; SUBCUTANEOUS at 23:46

## 2023-02-09 RX ADMIN — Medication 2000 MG: at 13:14

## 2023-02-09 RX ADMIN — MIDAZOLAM HYDROCHLORIDE 1 MG: 1 INJECTION, SOLUTION INTRAMUSCULAR; INTRAVENOUS at 13:13

## 2023-02-09 RX ADMIN — MIDAZOLAM HYDROCHLORIDE 1 MG: 1 INJECTION, SOLUTION INTRAMUSCULAR; INTRAVENOUS at 13:22

## 2023-02-09 RX ADMIN — ATENOLOL 25 MG: 50 TABLET ORAL at 21:38

## 2023-02-09 RX ADMIN — Medication 5 MG: at 17:44

## 2023-02-09 RX ADMIN — MIDAZOLAM HYDROCHLORIDE 1 MG: 1 INJECTION, SOLUTION INTRAMUSCULAR; INTRAVENOUS at 13:17

## 2023-02-09 RX ADMIN — ACETAMINOPHEN 1000 MG: 500 TABLET, FILM COATED ORAL at 11:55

## 2023-02-09 RX ADMIN — ATORVASTATIN CALCIUM 80 MG: 40 TABLET, FILM COATED ORAL at 21:38

## 2023-02-09 RX ADMIN — HYDROMORPHONE HYDROCHLORIDE 0.5 MG: 2 INJECTION, SOLUTION INTRAMUSCULAR; INTRAVENOUS; SUBCUTANEOUS at 18:34

## 2023-02-09 RX ADMIN — FENOFIBRATE 160 MG: 160 TABLET ORAL at 22:15

## 2023-02-09 RX ADMIN — FENTANYL CITRATE 50 MCG: 50 INJECTION, SOLUTION INTRAMUSCULAR; INTRAVENOUS at 13:13

## 2023-02-09 RX ADMIN — SODIUM CHLORIDE: 9 INJECTION, SOLUTION INTRAVENOUS at 21:39

## 2023-02-09 RX ADMIN — CIPROFLOXACIN 400 MG: 2 INJECTION, SOLUTION INTRAVENOUS at 16:50

## 2023-02-09 RX ADMIN — FENTANYL CITRATE 50 MCG: 50 INJECTION, SOLUTION INTRAMUSCULAR; INTRAVENOUS at 13:17

## 2023-02-09 RX ADMIN — PROPOFOL 200 MG: 10 INJECTION, EMULSION INTRAVENOUS at 16:43

## 2023-02-09 RX ADMIN — OXYCODONE 10 MG: 5 TABLET ORAL at 21:38

## 2023-02-09 ASSESSMENT — PAIN - FUNCTIONAL ASSESSMENT
PAIN_FUNCTIONAL_ASSESSMENT: NONE - DENIES PAIN
PAIN_FUNCTIONAL_ASSESSMENT: 0-10
PAIN_FUNCTIONAL_ASSESSMENT: 0-10

## 2023-02-09 ASSESSMENT — PAIN DESCRIPTION - ORIENTATION
ORIENTATION: LEFT;LOWER
ORIENTATION: LEFT

## 2023-02-09 ASSESSMENT — PAIN SCALES - GENERAL
PAINLEVEL_OUTOF10: 7
PAINLEVEL_OUTOF10: 9
PAINLEVEL_OUTOF10: 0
PAINLEVEL_OUTOF10: 7

## 2023-02-09 ASSESSMENT — PAIN DESCRIPTION - LOCATION
LOCATION: FLANK
LOCATION: FLANK;PENIS

## 2023-02-09 ASSESSMENT — PAIN DESCRIPTION - DESCRIPTORS
DESCRIPTORS: SHARP
DESCRIPTORS: ACHING;SORE;DISCOMFORT

## 2023-02-09 ASSESSMENT — PAIN DESCRIPTION - PAIN TYPE
TYPE: SURGICAL PAIN
TYPE: SURGICAL PAIN

## 2023-02-09 NOTE — PROGRESS NOTES
TRANSFER - OUT REPORT:           Verbal report given to Alexandra Adam RN(name) on [employer]  being transferred to IR Recovery 1(unit) for routine post-op              Report consisted of patients Situation, Background, Assessment and      Recommendations(SBAR). Information from the following report(s) SBAR, Procedure Summary, and MAR was reviewed with the receiving nurse. Opportunity for questions and clarification was provided. Conscious Sedation:    150 Mcg of Fentanyl administered   3 Mg of Versed administered     Pt tolerated procedure well.          Peripheral Intravenous Line:   Peripheral IV 02/09/23 Right Hand (Active)       VITALS:  BP (!) 144/84   Pulse 69   Temp 97.9 °F (36.6 °C) (Temporal)   Resp 10   Ht 5' 10\" (1.778 m)   Wt 227 lb (103 kg)   SpO2 98%   BMI 32.57 kg/m²

## 2023-02-09 NOTE — H&P
Department of Interventional Radiology  [phone]    History and Physical    Patient:  Susan Yang MRN:  [id number]  SSN:  [id number]    YOB: 1965  Age:  62 y.o. Sex:  male      Primary Care Provider:  ALYSA Arroyo - CNP  Referring Physician:  Todd Palacio MD    Subjective:     Chief Complaint: Left renal stone    History of the Present Illness: The patient is a 62 y.o. male who presents for IR guided left nephrostomy catheter placement under moderate sedation. Patient was seen in the emergency department on 12-4-2022 due to left flank pain. CT scan showed a 1 mm stone dependently in the bladder lumen adjacent to the left UVJ in keeping with recently passed stone. Patient also has multiple nonobstructing bilateral peripelvic stones measuring anywhere from 1 to 15 mm. Patient has a past medical history positive for diabetes, GERD, sleep apnea, Hodgkin's lymphoma    Patient is n.p.o. today. He denies taking any blood thinners    Past Medical History:   Diagnosis Date    Diabetes ([address]     type 2- oral agents- does not check glucose; denies hypoglycemia;     Generalized osteoarthrosis, unspecified site 9/7/2013    knees    GERD (gastroesophageal reflux disease)     prn meds    Hepatomegaly 9/7/2013    History of athlete's foot     History of cerebral venous sinus thrombosis associated with congenital heart disease     Pt denies any knowledge of this dx 2019- Pt denies ever having this dx    History of kidney stones     Hodgkin lymphoma ([facility]     dx 2001- no recurrence- chemo and stem cell     Hyperlipidemia     Hypertension     medication    OA (osteoarthritis)     knees    Obstructive sleep apnea (adult) (pediatric) 9/7/2013    uses CPAP    Prediabetes      Past Surgical History:   Procedure Laterality Date    CATARACT REMOVAL Bilateral 01/2022    COLONOSCOPY N/A 12/11/2017    COLONOSCOPY / BMI=31 performed by Jazmín Tolentino MD at [address] [place] [address] W/REMOVAL LESION BY HOT BX FORCEPS  12/11/2017         LITHOTRIPSY Left 01/05/2023    ESWL EXTRACORPOREAL SHOCK WAVE LITHOTRIPSY, LEFT performed by Huseyin Hui MD at [facility] MAIN OR    LYMPH NODE DISSECTION      OTHER SURGICAL HISTORY      Biopsy    SINUS SURGERY PROC UNLISTED      VASCULAR SURGERY      port insertion and removal - chemo        Review of Systems:    Pertinent items are noted in HPI. Prior to Admission medications    Medication Sig Start Date End Date Taking?  Authorizing Provider   amitriptyline (ELAVIL) 50 MG tablet Take 1 tablet by mouth nightly For sleep/neuropathy 1/30/23   ALYSA Delacruz CNP   atenolol (TENORMIN) 25 MG tablet Take 1 tablet by mouth daily For blood pressure  Patient taking differently: Take 25 mg by mouth at bedtime For blood pressure 1/30/23   ALYSA Serrato CNP   celecoxib (CELEBREX) 200 MG capsule Take 1 capsule by mouth 2 times daily With food for pain 1/30/23   ALYSA Delacruz CNP   Dapagliflozin-metFORMIN HCl ER (XIGDUO XR) 5-1000 MG TB24 Take 1 tablet by mouth daily With food for diabetes  Patient taking differently: Take 1 tablet by mouth nightly With food for diabetes 1/30/23   ALYSA Delacruz CNP   clotrimazole-betamethasone ([employer]) 1-0.05 % cream Apply to 2 times per day for fungal treatment (athlete's foot)  Patient taking differently: 2 times daily as needed Apply to 2 times per day for fungal treatment (athlete's foot) 1/30/23   ALYSA Serrato CNP   omeprazole (PRILOSEC) 40 MG delayed release capsule Take 1 capsule by mouth daily On empty stomach and wait at least 30 minutes before eating daily for stomach reflux  Patient taking differently: Take 40 mg by mouth daily as needed On empty stomach and wait at least 30 minutes before eating daily for stomach reflux 1/30/23   ALYSA Delacruz CNP   atorvastatin (LIPITOR) 80 MG tablet Take 1 tablet by mouth nightly For cholesterol 1/30/23   ALYSA Serrato CNP fenofibrate (TRIGLIDE) 160 MG tablet Take 1 tablet by mouth daily For cholesterol  Patient taking differently: Take 160 mg by mouth nightly For cholesterol 1/30/23   ALYSA Villalta CNP   ondansetron (ZOFRAN) 8 MG tablet Take 1 tablet by mouth every 8 hours as needed for Nausea or Vomiting  Patient not taking: Reported on 2/9/2023 12/9/22   ALYSA Bourgeois NP   triamcinolone (ARISTOCORT) 0.5 % cream Apply topically 2 times daily For itching/poison ivy/oak rash  Patient taking differently: Apply topically 2 times daily as needed For itching/poison ivy/oak rash 7/25/22   ALYSA Villalta CNP   CPAP Machine MISC 15 cm every night    Historical Provider, MD   aspirin 81 MG EC tablet Take 81 mg by mouth nightly preventative 4/25/18   Ar Automatic Reconciliation        Allergies   Allergen Reactions    Penicillins Rash    Sulfa Antibiotics Rash       Family History   Problem Relation Age of Onset    Heart Attack Mother 61        MI    Elevated Lipids Mother     Hypertension Mother     Heart Disease Mother     No Known Problems Father     No Known Problems Sister     Mult Sclerosis Sister     Cancer Sister      Social History     Tobacco Use    Smoking status: Never    Smokeless tobacco: Never   Substance Use Topics    Alcohol use: No        Not in a hospital admission.     Objective:       Physical Examination:    Vitals:    02/09/23 1213   BP: (!) 144/84   Pulse: 66   Resp: 20   Temp: 97.9 °F (36.6 °C)   TempSrc: Temporal   SpO2: 97%   Weight: 227 lb (103 kg)   Height: 5' 10\" (1.778 m)       Pain Assessment                                                     HEART: regular rate and rhythm, S1, S2 normal, no murmur, click, rub or gallop  LUNG: clear to auscultation bilaterally  ABDOMEN: soft, non-tender; bowel sounds normal; no masses,  no organomegaly  EXTREMITIES: no pedal edema noted    Laboratory:     Lab Results   Component Value Date/Time     01/23/2023 09:15 AM     12/04/2022 01:49 PM K 4.5 01/23/2023 09:15 AM    K 4.2 01/05/2023 06:35 AM     01/23/2023 09:15 AM     12/04/2022 01:49 PM    CO2 26 01/23/2023 09:15 AM    CO2 26 12/04/2022 01:49 PM    BUN 15 01/23/2023 09:15 AM    BUN 19 12/04/2022 01:49 PM    GFRAA >60 07/18/2022 08:09 AM    GFRAA 113 02/21/2022 02:36 AM    GLOB 2.9 01/23/2023 09:15 AM    GLOB 4.0 12/04/2022 01:49 PM    ALT 77 01/23/2023 09:15 AM    ALT 76 12/04/2022 01:49 PM     Lab Results   Component Value Date/Time    WBC 7.3 02/02/2023 10:21 AM    WBC 7.9 01/05/2023 06:36 AM    HGB 16.4 02/02/2023 10:21 AM    HGB 16.9 01/05/2023 06:36 AM    HCT 48.6 02/02/2023 10:21 AM    HCT 50.6 01/05/2023 06:36 AM     02/02/2023 10:21 AM     01/05/2023 06:36 AM     Lab Results   Component Value Date/Time    APTT 25.8 02/09/2023 11:52 AM    INR 1.0 02/09/2023 11:52 AM    INR 1.1 02/09/2023 11:43 AM       Assessment:   63-year-old male with multiple peripelvic stones bulkier on the left side measuring from 1 to 15 mm. Plan:     Planned Procedure: IR guided left nephrostomy catheter placement under moderate sedation    Risks, benefits, and alternatives reviewed with patient and he agrees to proceed with the procedure.       Signed By: JYOTI Quezada     February 9, 2023

## 2023-02-09 NOTE — ANESTHESIA PRE PROCEDURE
Department of Anesthesiology  Preprocedure Note       Name:  Anny Snell   Age:  62 y.o.  :  1965                                          MRN:  [id number]         Date:  2023      Surgeon: Channing Steiner):  Jean Griffin MD    Procedure: Procedure(s):  NEPHROLITHOTOMY PERCUTANEOUS RIGHT IR TUBE PLACEMENT 1PM    Medications prior to admission:   Prior to Admission medications    Medication Sig Start Date End Date Taking?  Authorizing Provider   amitriptyline (ELAVIL) 50 MG tablet Take 1 tablet by mouth nightly For sleep/neuropathy 23   Luis Po, APRN - CNP   atenolol (TENORMIN) 25 MG tablet Take 1 tablet by mouth daily For blood pressure  Patient taking differently: Take 25 mg by mouth at bedtime For blood pressure 23   ALYSA Sherman CNP   celecoxib (CELEBREX) 200 MG capsule Take 1 capsule by mouth 2 times daily With food for pain 23   Luis Po, APRINOCENCIA - CNP   Dapagliflozin-metFORMIN HCl ER (XIGDUO XR) 5-1000 MG TB24 Take 1 tablet by mouth daily With food for diabetes  Patient taking differently: Take 1 tablet by mouth nightly With food for diabetes 23   Luis Po, APRN - CNP   clotrimazole-betamethasone (Sohan Oumou) 1-0.05 % cream Apply to 2 times per day for fungal treatment (athlete's foot)  Patient taking differently: 2 times daily as needed Apply to 2 times per day for fungal treatment (athlete's foot) 23   ALYSA Sherman CNP   omeprazole (PRILOSEC) 40 MG delayed release capsule Take 1 capsule by mouth daily On empty stomach and wait at least 30 minutes before eating daily for stomach reflux  Patient taking differently: Take 40 mg by mouth daily as needed On empty stomach and wait at least 30 minutes before eating daily for stomach reflux 23   Luis Po, APRN - CNP   atorvastatin (LIPITOR) 80 MG tablet Take 1 tablet by mouth nightly For cholesterol 23   Luis Po, APRN - CNP   fenofibrate (TRIGLIDE) 160 MG tablet Take 1 tablet by mouth daily For cholesterol  Patient taking differently: Take 160 mg by mouth nightly For cholesterol 1/30/23   ALYSA Soto CNP   ondansetron (ZOFRAN) 8 MG tablet Take 1 tablet by mouth every 8 hours as needed for Nausea or Vomiting  Patient not taking: Reported on 2/9/2023 12/9/22   ALYSA Armenta NP   triamcinolone (ARISTOCORT) 0.5 % cream Apply topically 2 times daily For itching/poison ivy/oak rash  Patient taking differently: Apply topically 2 times daily as needed For itching/poison ivy/oak rash 7/25/22   ALYSA Soto CNP   CPAP Machine MISC 15 cm every night    Historical Provider, MD   aspirin 81 MG EC tablet Take 81 mg by mouth nightly preventative 4/25/18   Ar Automatic Reconciliation       Current medications:    Current Facility-Administered Medications   Medication Dose Route Frequency Provider Last Rate Last Admin    lidocaine 1 % injection 1 mL  1 mL IntraDERmal Once PRN Shawn Antunez MD        lactated ringers IV soln infusion   IntraVENous Continuous Shawn Antunez  mL/hr at 02/09/23 1156 New Bag at 02/09/23 1156    sodium chloride flush 0.9 % injection 5-40 mL  5-40 mL IntraVENous 2 times per day Shawn Antunez MD        sodium chloride flush 0.9 % injection 5-40 mL  5-40 mL IntraVENous PRN Shawn Antunez MD        0.9 % sodium chloride infusion   IntraVENous PRN Shawn Antunez MD        midazolam PF (VERSED) injection 2 mg  2 mg IntraVENous Once PRN Shawn Antunez MD        ciprofloxacin (CIPRO) IVPB 400 mg  400 mg IntraVENous On Call to [address] [place], ALYSA Hutchison NP   Held at 02/09/23 1155       Allergies:     Allergies   Allergen Reactions    Penicillins Rash    Sulfa Antibiotics Rash       Problem List:    Patient Active Problem List   Diagnosis Code    Hyperlipemia E78.5    Hepatomegaly R16.0    Arthritis M19.90    GERD (gastroesophageal reflux disease) K21.9    Chronic kidney disease N18.9    Essential hypertension, benign I10    Reflux esophagitis K21.00    Severe obesity (HCC) E66.01    Type 2 diabetes mellitus without complication, without long-term current use of insulin (HCC) E11.9    KATERINA on CPAP G47.33, Z99.89    Kidney stone N20.0       Past Medical History:        Diagnosis Date    Diabetes ([address]     type 2- oral agents- does not check glucose; denies hypoglycemia;    Generalized osteoarthrosis, unspecified site 9/7/2013    knees    GERD (gastroesophageal reflux disease)     prn meds    Hepatomegaly 9/7/2013    History of athlete's foot     History of cerebral venous sinus thrombosis associated with congenital heart disease     Pt denies any knowledge of this dx 2019- Pt denies ever having this dx    History of kidney stones     Hodgkin lymphoma ([address]     dx 2001- no recurrence- chemo and stem cell     Hyperlipidemia     Hypertension     medication    OA (osteoarthritis)     knees    Obstructive sleep apnea (adult) (pediatric) 9/7/2013    uses CPAP    Prediabetes        Past Surgical History:        Procedure Laterality Date    CATARACT REMOVAL Bilateral 01/2022    COLONOSCOPY N/A 12/11/2017    COLONOSCOPY / BMI=31 performed by Altagracia Echevarria MD at hospitals  12/11/2017         LITHOTRIPSY Left 01/05/2023    ESWL EXTRACORPOREAL SHOCK WAVE LITHOTRIPSY, LEFT performed by Theo Aguilar [facility] at [facility] MAIN OR    LYMPH NODE DISSECTION      OTHER SURGICAL HISTORY      Biopsy    SINUS SURGERY [address] UNLISTED      VASCULAR SURGERY      port insertion and removal - chemo       Social History:    Social History     Tobacco Use    Smoking status: Never    Smokeless tobacco: Never   Substance Use Topics    Alcohol use:  No                                Counseling given: Not Answered      Vital Signs (Current):   Vitals:    02/01/23 1135 02/09/23 1117   BP:  (!) 162/88   Pulse:  69   Resp:  16   Temp:  97.9 °F (36.6 °C)   TempSrc:  Oral   SpO2:  100%   Weight: 237 lb (107.5 kg) 227 lb 9.6 oz (103.2 kg)   Height: 5' 11\" (1.803 m) 5' 10\" (1.778 m)                                              BP Readings from Last 3 Encounters:   02/09/23 (!) 162/88   02/09/23 (!) 144/84   01/30/23 136/74       NPO Status: Time of last liquid consumption: 2100                        Time of last solid consumption: 2100                        Date of last liquid consumption: 02/08/23                        Date of last solid food consumption: 02/08/23    BMI:   Wt Readings from Last 3 Encounters:   02/09/23 227 lb 9.6 oz (103.2 kg)   02/09/23 227 lb (103 kg)   01/30/23 232 lb 6.4 oz (105.4 kg)     Body mass index is 32.66 kg/m².     CBC:   Lab Results   Component Value Date/Time    WBC 7.3 02/02/2023 10:21 AM    RBC 5.49 02/02/2023 10:21 AM    HGB 16.4 02/02/2023 10:21 AM    HCT 48.6 02/02/2023 10:21 AM    MCV 88.5 02/02/2023 10:21 AM    MCV 95.0 01/23/2023 09:10 AM    RDW 13.2 02/02/2023 10:21 AM     02/02/2023 10:21 AM       CMP:   Lab Results   Component Value Date/Time     01/23/2023 09:15 AM    K 4.5 01/23/2023 09:15 AM     01/23/2023 09:15 AM    CO2 26 01/23/2023 09:15 AM    BUN 15 01/23/2023 09:15 AM    CREATININE 1.10 01/23/2023 09:15 AM    GFRAA >60 07/18/2022 08:09 AM    AGRATIO 1.6 02/21/2022 02:36 AM    LABGLOM >60 01/23/2023 09:15 AM    GLUCOSE 133 01/23/2023 09:15 AM    PROT 7.0 01/23/2023 09:15 AM    CALCIUM 9.3 01/23/2023 09:15 AM    BILITOT 0.6 01/23/2023 09:15 AM    ALKPHOS 57 01/23/2023 09:15 AM    ALKPHOS 91 02/21/2022 02:36 AM    AST 44 01/23/2023 09:15 AM    ALT 77 01/23/2023 09:15 AM       POC Tests:   Recent Labs     02/09/23  1144   POCGLU 118*       Coags:   Lab Results   Component Value Date/Time    PROTIME 13.8 02/09/2023 11:52 AM    PROTIME 13.0 02/09/2023 11:43 AM    INR 1.0 02/09/2023 11:52 AM    INR 1.1 02/09/2023 11:43 AM    APTT 25.8 02/09/2023 11:52 AM       HCG (If Applicable): No results found for: PREGTESTUR, PREGSERUM, HCG, HCGQUANT     ABGs: No results found for: PHART, PO2ART, IAH2NDG, MSW5QAN, BEART, R3JKCIJA     Type & Screen (If Applicable):  No results found for: LABABO, LABRH    Drug/Infectious Status (If Applicable):  Lab Results   Component Value Date/Time    HEPCAB <0.1 12/21/2020 10:23 AM       COVID-19 Screening (If Applicable):   Lab Results   Component Value Date/Time    COVID19 Not Detected 11/30/2020 09:14 AM           Anesthesia Evaluation  Patient summary reviewed  Airway: Mallampati: IV  TM distance: >3 FB   Neck ROM: full  Mouth opening: < 3 FB   Dental: normal exam         Pulmonary:normal exam  breath sounds clear to auscultation  (+) sleep apnea: on CPAP,                             Cardiovascular:  Exercise tolerance: good (>4 METS),   (+) hypertension:, hyperlipidemia    (-) murmur    ECG reviewed  Rhythm: regular  Rate: normal                 ROS comment: Pt denies recent CP, SOB or changes in functional status     Neuro/Psych:   Negative Neuro/Psych ROS              GI/Hepatic/Renal:   (+) GERD: well controlled, liver disease (Hepatomegaly):, renal disease: kidney stones,           Endo/Other:    (+) Diabetes (A1c of 6.4)Type II DM, , : arthritis: OA., .                  ROS comment: Obesity Abdominal:              PE comment: Deferred   Vascular: negative vascular ROS. Other Findings:           Anesthesia Plan      general     ASA 2       Induction: intravenous. Anesthetic plan and risks discussed with patient.                         Wallace Culp MD   2/9/2023

## 2023-02-09 NOTE — BRIEF OP NOTE
Brief Postoperative Note      Patient: Sanaz Barbosa  YOB: 1965  MRN: [id number]    Date of Procedure: 2/9/2023    Pre-Op Diagnosis: Kidney stone [N20.0]    Post-Op Diagnosis: Same       Procedure(s):  NEPHROLITHOTOMY PERCUTANEOUS LEFT IR TUBE PLACEMENT 1PM    Surgeon(s):  Sallie Dudley MD    Assistant:  * No surgical staff found *    Anesthesia: General    Estimated Blood Loss (mL): less than 648     Complications: None    Specimens:   ID Type Source Tests Collected by Time Destination   1 : kidney stone Stone (Calculus) Kidney STONE ANALYSIS Sallie Dudley MD 2/9/2023 2535        Implants:  Implant Name Type Inv.  Item Serial No.  Lot No. LRB No. Used Action   STENT URET 7FR [id number] HYDR+ PGTL Sharma Ni LO - [id number]  [facility] 7FR L28CM HYDR+ PGTL TAPR TIP GRAD BLDR MRK LO  [facility]- [id number] Left 1 Implanted         Drains:   Nephrostomy Tube 1 Left Flank 20 fr (Active)       [REMOVED] Nephrostomy Tube Left Flank 5 fr (Removed)       Findings: see op note    Electronically signed by Christopher Yu MD on 2/9/2023 at 5:51 PM

## 2023-02-09 NOTE — H&P
Mounika Aguiar  : 1965         Chief Complaint   Patient presents with    Nephrolithiasis            HPI      Mounika Aguiar is a 62 y.o. male  Patient returns today for follow-up after undergoing a left ESWL. Patient had large stone in the mid to upper pole of the left kidney measuring 19 x 15 mm. It appeared that a total of 3000 shockwaves were given there was minimal change in the stone after lithotripsy. Patient is back today for postop visit and also to discuss other treatment options available. Overall he is doing well has not had any pain discomfort and has not passed any stone fragments. No fever chills have been reported.            Past Medical History        Past Medical History:   Diagnosis Date    Diabetes ([facility] [address]       type 2- oral agents- does not check glucose; denies hypoglycemia; A1c 6.3 22    Generalized osteoarthrosis, unspecified site 2013     knees    GERD (gastroesophageal reflux disease)       prn meds    Hepatomegaly 2013    History of athlete's foot      History of cerebral venous sinus thrombosis associated with congenital heart disease       Pt denies any knowledge of this dx - Pt denies ever having this dx    History of kidney stones      Hodgkin lymphoma ([facility] [address]       dx - no recurrence- chemo and stem cell     Hyperlipidemia      Hypertension       medication    OA (osteoarthritis)       knees    Obstructive sleep apnea (adult) (pediatric) 2013     uses CPAP    Prediabetes           Past Surgical History         Past Surgical History:   Procedure Laterality Date    CATARACT REMOVAL Bilateral 2022    COLONOSCOPY N/A 2017     COLONOSCOPY / BMI=31 performed by Primitivo James MD at [address] [facility]   2017          LITHOTRIPSY        LITHOTRIPSY Left 2023     ESWL EXTRACORPOREAL SHOCK WAVE LITHOTRIPSY, LEFT performed by Shawna Flores MD at [address] DISSECTION        OTHER SURGICAL HISTORY         Biopsy    SINUS SURGERY PROC UNLISTED        VASCULAR SURGERY         port insertion and removal - chemo         Current Facility-Administered Medications          Current Outpatient Medications   Medication Sig Dispense Refill    ondansetron (ZOFRAN) 8 MG tablet Take 1 tablet by mouth every 8 hours as needed for Nausea or Vomiting 15 tablet 0    amitriptyline (ELAVIL) 50 MG tablet Take 1 tablet by mouth nightly For sleep/neuropathy 90 tablet 3    atenolol (TENORMIN) 25 MG tablet Take 1 tablet by mouth in the morning. For blood pressure. (Patient taking differently: Take 25 mg by mouth at bedtime For blood pressure) 90 tablet 2    atorvastatin (LIPITOR) 80 MG tablet Take 1 tablet by mouth in the morning. For cholesterol. (Patient taking differently: Take 80 mg by mouth nightly For cholesterol) 90 tablet 3    celecoxib (CELEBREX) 200 MG capsule Take 1 capsule by mouth in the morning and 1 capsule before bedtime. With food for pain. 180 capsule 3    Dapagliflozin-metFORMIN HCl ER (XIGDUO XR) 5-1000 MG TB24 Take 1 tablet by mouth daily With food for diabetes (Patient taking differently: Take 1 tablet by mouth nightly With food for diabetes) 90 tablet 3    fenofibrate (TRICOR) 145 MG tablet Take 1 tablet by mouth in the morning. For cholesterol. (Patient taking differently: Take 145 mg by mouth nightly For cholesterol) 90 tablet 3    omeprazole (PRILOSEC) 40 MG delayed release capsule Take 1 capsule by mouth in the morning. On empty stomach and wait at least 30 minutes before eating daily for stomach reflux.  90 capsule 3    clotrimazole-betamethasone (LOTRISONE) 1-0.05 % cream Apply topically 2 times daily For fungal treatment (athlete's foot) 45 g 5    triamcinolone (ARISTOCORT) 0.5 % cream Apply topically 2 times daily For itching/poison ivy/oak rash 60 g 2    nystatin (MYCOSTATIN) 787203 UNIT/ML suspension Take 5 mLs by mouth in the morning and 5 mLs at noon and 5 mLs in the evening and 5 mLs before bedtime. Swish and spit for tongue sensitivity/thrush. 473 mL 2    CPAP Machine MISC 15 cm every night        aspirin 81 MG EC tablet Take 81 mg by mouth nightly          No current facility-administered medications for this visit. Allergies   Allergen Reactions    Penicillins Rash    Sulfa Antibiotics Rash      Social History               Socioeconomic History    Marital status:        Spouse name: Not on file    Number of children: Not on file    Years of education: Not on file    Highest education level: Not on file   Occupational History    Not on file   Tobacco Use    Smoking status: Never    Smokeless tobacco: Never   Vaping Use    Vaping Use: Never used   Substance and Sexual Activity    Alcohol use: No    Drug use: No    Sexual activity: Not on file   Other Topics Concern    Not on file   Social History Narrative    Not on file      Social Determinants of Health      Financial Resource Strain: Not on file   Food Insecurity: Not on file   Transportation Needs: Not on file   Physical Activity: Not on file   Stress: Not on file   Social Connections: Not on file   Intimate Partner Violence: Not on file   Housing Stability: Not on file         Family History         Family History   Problem Relation Age of Onset    Heart Attack Mother 61         MI    Elevated Lipids Mother      Hypertension Mother      Heart Disease Mother      No Known Problems Father      No Known Problems Sister      Mult Sclerosis Sister      Cancer Sister              Review of Systems  Constitutional:   Negative for fever. GI:  Negative for nausea. Musculoskeletal:  Negative for back pain.      Urinalysis  UA - Dipstick        Results for orders placed or performed in visit on 01/16/23   AMB POC URINALYSIS DIP STICK AUTO W/O MICRO   Result Value Ref Range     Color (UA POC)         Clarity (UA POC)         Glucose, Urine,  Negative     Bilirubin, Urine, POC Negative Negative KETONES, Urine, POC Negative Negative     Specific Gravity, Urine, POC 1.03 1.001 - 1.035     Blood (UA POC) Large Negative     pH, Urine, POC 5.0 4.6 - 8.0     Protein, Urine, POC 30 Negative     Urobilinogen, POC 0.2 mg/dL       Nitrite, Urine, POC Negative Negative     Leukocyte Esterase, Urine, POC Negative Negative   PHYSICAL EXAM        GENERAL: No acute distress, Awake, Alert, Oriented X 3, Gait normal  CHEST AND LUNG: Easy work of breathing, clear to auscultation bilaterally, no cyanosis  CARDIAC: regular rate and rhythm  ABDOMEN: soft, non tender, non-distended, positive bowel sounds, no organomegaly, no palpable masses, no guarding, no rebound tenderness  SKIN: No rash, no erythema, no lacerations or abrasions, no ecchymosis  MUSCULOSKELETAL- normal gait and station. KUB: Normal gas pattern is present. Within the lower pole the right kidney there is a couple small stones. Within the left kidney the 19 x 15 mm stone is unchanged and there are 3 other stones in the lower pole the kidney measuring 3 to 5 mm each. Assessment and Plan      ICD-10-CM     1. Nephrolithiasis  N20.0 AMB POC URINALYSIS DIP STICK AUTO W/O MICRO       XR ABDOMEN (KUB) (SINGLE AP VIEW)          KUB was shown to patient and other options were discussed in great detail. I explained to patient I do not feel like a repeat ESWL would be beneficial.  We discussed ureteroscopy/pyeloscopy with holmium laser versus PCNL. He was given hand written material about both of these procedures. He will discuss with his wife. I will discuss with Dr. Ly Power on Wednesday and I will call patient. Patient wanted to know what I thought was the best procedure and I feel the left PCNL would be the best option to rid him of the stones in the left kidney. Bernardo Verduzco, APRN - NP  Dr. Ravinder Roy is supervising physician today and he approves plan of care. Return for I will call.      Elements of this note have been dictated using speech recognition software. Although reviewed, errors of speech recognition may have occurred. ADDENDUM:     Patient would like to proceed with left percutaneous nephrolithotomy next available time. I reviewed the risks/benefits/alternatives for stone treatment today in detail with the patient. Treatment options discussed include medical expulsive therapy (MET) vs. ESWL vs. Ureteroscopy/laser lithotropsy vs. PCNL. After our discussion, the patient would like to proceed with LEFT percutaneous nephrolithotomy, possible antegrade stent placement. Risks of PCNL that we discussed were bleeding, infection, injury to the bladder/ureter/kidney/bowel and surrounding structures, pneumothorax, need for chest tube, need for nephrostomy tube, incomplete fragmentation of stones, steinstrasse, inability to remove all stone fragments in 1 operative setting requiring additional operative intervention in the form of second look PCNL vs. ESWL vs ureteroscopy/laser lithotripsy and/or stent placement, nephro-cutaneous fistula, ureteral stricture, need for ureteral stent, stent migration, stent pain, urinary retention, risks of general anesthesia, recurrent stones. The patient expressed understanding of the above.

## 2023-02-09 NOTE — PROGRESS NOTES
TRANSFER - OUT REPORT:           Verbal report given to PACU RN(name) on Jaime Myers  being transferred to [facility]) for ordered procedure              Report consisted of patients Situation, Background, Assessment and      Recommendations(SBAR). Information from the following report(s) SBAR, Procedure Summary, and MAR was reviewed with the receiving nurse. Opportunity for questions and clarification was provided. Conscious Sedation:    150 Mcg of Fentanyl administered   3 Mg of Versed administered     Pt tolerated procedure well.          Peripheral Intravenous Line:   Peripheral IV 02/09/23 Right Hand (Active)       VITALS:  BP (!) 144/84   Pulse 69   Temp 97.9 °F (36.6 °C) (Temporal)   Resp 10   Ht 5' 10\" (1.778 m)   Wt 227 lb (103 kg)   SpO2 98%   BMI 32.57 kg/m²

## 2023-02-09 NOTE — OP NOTE
Department of Interventional Radiology  [phone]        Interventional Radiology Brief Procedure Note    Patient: Mounika Aguiar MRN: [id number]  SSN: [id number]    YOB: 1965  Age: 62 y.o. Sex: male      Date of Procedure: 2/9/2023    Pre-Procedure Diagnosis: kidney stones     Post-Procedure Diagnosis: SAME    Procedure(s): Percutaneous Nephrostomy Tube Placement    Brief Description of Procedure: access for PCNL    Performed By: Cheko Mae MD     Assistants: None    Anesthesia:Moderate Sedation    Estimated Blood Loss: Less than 10ml    Specimens:  None    Implants:  Nephro-Ureteral Drain    Findings: small posterior stones that were accessed.   Larger stone is anterior and hopefully reachable with flex scope    Complications: None    Recommendations: to OR     Follow Up: prn    Signed By: Cheko Mae MD     February 9, 2023

## 2023-02-09 NOTE — PRE SEDATION
Sedation Pre-Procedure Note    Patient Name: Caroline Graham   YOB: 1965  Room/Bed: Room/bed info not found  Medical Record Number: [id number]  Date: 2/9/2023   Time: 12:42 PM       Indication: Left renal stones    Consent: I have discussed with the patient and/or the patient representative the indication, alternatives, and the possible risks and/or complications of the planned procedure and the anesthesia methods. The patient and/or patient representative appear to understand and agree to proceed. Vital Signs:   Vitals:    02/09/23 1213   BP: (!) 144/84   Pulse: 66   Resp: 20   Temp: 97.9 °F (36.6 °C)   SpO2: 97%       Past Medical History:   has a past medical history of Diabetes ([facility] [address] Generalized osteoarthrosis, unspecified site, GERD (gastroesophageal reflux disease), Hepatomegaly, History of athlete's foot, History of cerebral venous sinus thrombosis associated with congenital heart disease, History of kidney stones, Hodgkin lymphoma ([facility] [address] Hyperlipidemia, Hypertension, OA (osteoarthritis), Obstructive sleep apnea (adult) (pediatric), and Prediabetes. Past Surgical History:   has a past surgical history that includes lymph node dissection; colsc flx w/removal lesion by hot bx forceps (12/11/2017); Colonoscopy (N/A, 12/11/2017); Cataract removal (Bilateral, 01/2022); other surgical history; vascular surgery; sinus surgery proc unlisted; and Lithotripsy (Left, 01/05/2023). Medications:   Scheduled Meds:   Continuous Infusions:   PRN Meds:   Home Meds:   Prior to Admission medications    Medication Sig Start Date End Date Taking?  Authorizing Provider   amitriptyline (ELAVIL) 50 MG tablet Take 1 tablet by mouth nightly For sleep/neuropathy 1/30/23   ALYSA Villagomez - CNP   atenolol (TENORMIN) 25 MG tablet Take 1 tablet by mouth daily For blood pressure  Patient taking differently: Take 25 mg by mouth at bedtime For blood pressure 1/30/23   ALYSA Mcclain - WILLIAN   celecoxib (CELEBREX) 200 MG capsule Take 1 capsule by mouth 2 times daily With food for pain 1/30/23   Luis Po, APRN - CNP   Dapagliflozin-metFORMIN HCl ER (XIGDUO XR) 5-1000 MG TB24 Take 1 tablet by mouth daily With food for diabetes  Patient taking differently: Take 1 tablet by mouth nightly With food for diabetes 1/30/23   Luis Po, APRN - CNP   clotrimazole-betamethasone (LOTRISONE) 1-0.05 % cream Apply to 2 times per day for fungal treatment (athlete's foot)  Patient taking differently: 2 times daily as needed Apply to 2 times per day for fungal treatment (athlete's foot) 1/30/23   ALYSA Sherman CNP   omeprazole (PRILOSEC) 40 MG delayed release capsule Take 1 capsule by mouth daily On empty stomach and wait at least 30 minutes before eating daily for stomach reflux  Patient taking differently: Take 40 mg by mouth daily as needed On empty stomach and wait at least 30 minutes before eating daily for stomach reflux 1/30/23   Luis Po, APRN - CNP   atorvastatin (LIPITOR) 80 MG tablet Take 1 tablet by mouth nightly For cholesterol 1/30/23   Luis Po, ALYSA - CNP   fenofibrate (TRIGLIDE) 160 MG tablet Take 1 tablet by mouth daily For cholesterol  Patient taking differently: Take 160 mg by mouth nightly For cholesterol 1/30/23   Luis Po, APRINOCENCIA - CNP   ondansetron (ZOFRAN) 8 MG tablet Take 1 tablet by mouth every 8 hours as needed for Nausea or Vomiting  Patient not taking: Reported on 2/9/2023 12/9/22   ALYSA Mac NP   triamcinolone (ARISTOCORT) 0.5 % cream Apply topically 2 times daily For itching/poison ivy/oak rash  Patient taking differently: Apply topically 2 times daily as needed For itching/poison ivy/oak rash 7/25/22   Luis Po, APRINOCENCIA - CNP   CPAP Machine MISC 15 cm every night    Historical Provider, MD   aspirin 81 MG EC tablet Take 81 mg by mouth nightly preventative 4/25/18   Ar Automatic Reconciliation           Pre-Sedation Documentation and Exam:   I have personally completed a history, physical exam & review of systems for this patient (see notes). Vital signs have been reviewed (see flow sheet for vitals).     Mallampati Airway Assessment:  normal, dentition not prohibitive, Mallampati Class III - (soft palate & base of uvula are visible)    Prior History of Anesthesia Complications:   none    ASA Classification:  Class 2 - A normal healthy patient with mild systemic disease    Sedation/ Anesthesia Plan:   intravenous sedation    Medications Planned:   midazolam (Versed) intravenously and fentanyl intravenously    Patient is an appropriate candidate for plan of sedation: yes    Electronically signed by JYOTI Roman on 2/9/2023 at 12:42 PM

## 2023-02-09 NOTE — ANESTHESIA PROCEDURE NOTES
Airway  Date/Time: 2/9/2023 4:45 PM  Urgency: elective    Airway not difficult    General Information and Staff    Patient location during procedure: OR  Resident/CRNA: ALYSA Malin - CRNA  Performed: resident/CRNA     Indications and Patient Condition  Indications for airway management: anesthesia  Spontaneous Ventilation: absent  Sedation level: deep  Preoxygenated: yes  Patient position: sniffing  MILS not maintained throughout  Mask difficulty assessment: vent by bag mask + OA or adjuvant +/- NMBA    Final Airway Details  Final airway type: endotracheal airway      Successful airway: ETT  Cuffed: yes   Successful intubation technique: direct laryngoscopy  Facilitating devices/methods: intubating stylet  Endotracheal tube insertion site: oral  Blade: Ariadne  Blade size: #4  ETT size (mm): 8.0  Cormack-Lehane Classification: grade I - full view of glottis  Placement verified by: chest auscultation and capnometry   Measured from: teeth  ETT to teeth (cm): 22  Number of attempts at approach: 1  Ventilation between attempts: bag mask    Additional Comments  SIVI with MDA at bedside, intubating by RICHI Mack, airway secured, lips/teeth unchanged, VSS.   no

## 2023-02-10 ENCOUNTER — APPOINTMENT (OUTPATIENT)
Dept: GENERAL RADIOLOGY | Age: 58
End: 2023-02-10
Attending: UROLOGY
Payer: COMMERCIAL

## 2023-02-10 LAB
ANION GAP SERPL CALC-SCNC: 4 MMOL/L (ref 2–11)
BASOPHILS # BLD: 0.1 K/UL (ref 0–0.2)
BASOPHILS NFR BLD: 0 % (ref 0–2)
BUN SERPL-MCNC: 14 MG/DL (ref 6–23)
CALCIUM SERPL-MCNC: 8.7 MG/DL (ref 8.3–10.4)
CHLORIDE SERPL-SCNC: 107 MMOL/L (ref 101–110)
CO2 SERPL-SCNC: 28 MMOL/L (ref 21–32)
CREAT SERPL-MCNC: 1.2 MG/DL (ref 0.8–1.5)
DIFFERENTIAL METHOD BLD: ABNORMAL
EOSINOPHIL # BLD: 0.1 K/UL (ref 0–0.8)
EOSINOPHIL NFR BLD: 1 % (ref 0.5–7.8)
ERYTHROCYTE [DISTWIDTH] IN BLOOD BY AUTOMATED COUNT: 13.4 % (ref 11.9–14.6)
GLUCOSE BLD STRIP.AUTO-MCNC: 110 MG/DL (ref 65–100)
GLUCOSE BLD STRIP.AUTO-MCNC: 118 MG/DL (ref 65–100)
GLUCOSE BLD STRIP.AUTO-MCNC: 126 MG/DL (ref 65–100)
GLUCOSE BLD STRIP.AUTO-MCNC: 150 MG/DL (ref 65–100)
GLUCOSE SERPL-MCNC: 161 MG/DL (ref 65–100)
HCT VFR BLD AUTO: 44.8 % (ref 41.1–50.3)
HGB BLD-MCNC: 14.6 G/DL (ref 13.6–17.2)
IMM GRANULOCYTES # BLD AUTO: 0.1 K/UL (ref 0–0.5)
IMM GRANULOCYTES NFR BLD AUTO: 0 % (ref 0–5)
LYMPHOCYTES # BLD: 2.2 K/UL (ref 0.5–4.6)
LYMPHOCYTES NFR BLD: 16 % (ref 13–44)
MCH RBC QN AUTO: 30.4 PG (ref 26.1–32.9)
MCHC RBC AUTO-ENTMCNC: 32.6 G/DL (ref 31.4–35)
MCV RBC AUTO: 93.1 FL (ref 82–102)
MONOCYTES # BLD: 1.1 K/UL (ref 0.1–1.3)
MONOCYTES NFR BLD: 8 % (ref 4–12)
NEUTS SEG # BLD: 10.4 K/UL (ref 1.7–8.2)
NEUTS SEG NFR BLD: 75 % (ref 43–78)
NRBC # BLD: 0 K/UL (ref 0–0.2)
PLATELET # BLD AUTO: 251 K/UL (ref 150–450)
PMV BLD AUTO: 9.5 FL (ref 9.4–12.3)
POTASSIUM SERPL-SCNC: 4.2 MMOL/L (ref 3.5–5.1)
RBC # BLD AUTO: 4.81 M/UL (ref 4.23–5.6)
SERVICE CMNT-IMP: ABNORMAL
SODIUM SERPL-SCNC: 139 MMOL/L (ref 133–143)
WBC # BLD AUTO: 13.8 K/UL (ref 4.3–11.1)

## 2023-02-10 PROCEDURE — G0378 HOSPITAL OBSERVATION PER HR: HCPCS

## 2023-02-10 PROCEDURE — 6370000000 HC RX 637 (ALT 250 FOR IP): Performed by: NURSE PRACTITIONER

## 2023-02-10 PROCEDURE — 74018 RADEX ABDOMEN 1 VIEW: CPT

## 2023-02-10 PROCEDURE — 6360000002 HC RX W HCPCS: Performed by: UROLOGY

## 2023-02-10 PROCEDURE — 99024 POSTOP FOLLOW-UP VISIT: CPT | Performed by: NURSE PRACTITIONER

## 2023-02-10 PROCEDURE — 82962 GLUCOSE BLOOD TEST: CPT

## 2023-02-10 PROCEDURE — 36415 COLL VENOUS BLD VENIPUNCTURE: CPT

## 2023-02-10 PROCEDURE — 80048 BASIC METABOLIC PNL TOTAL CA: CPT

## 2023-02-10 PROCEDURE — 2580000003 HC RX 258: Performed by: UROLOGY

## 2023-02-10 PROCEDURE — 85025 COMPLETE CBC W/AUTO DIFF WBC: CPT

## 2023-02-10 PROCEDURE — 6370000000 HC RX 637 (ALT 250 FOR IP): Performed by: UROLOGY

## 2023-02-10 RX ORDER — DOCUSATE SODIUM 100 MG/1
100 CAPSULE, LIQUID FILLED ORAL
Status: COMPLETED | OUTPATIENT
Start: 2023-02-10 | End: 2023-02-10

## 2023-02-10 RX ORDER — DOCUSATE SODIUM 100 MG/1
100 CAPSULE, LIQUID FILLED ORAL 2 TIMES DAILY
Status: DISCONTINUED | OUTPATIENT
Start: 2023-02-10 | End: 2023-02-11 | Stop reason: HOSPADM

## 2023-02-10 RX ADMIN — HYDROMORPHONE HYDROCHLORIDE 0.5 MG: 1 INJECTION, SOLUTION INTRAMUSCULAR; INTRAVENOUS; SUBCUTANEOUS at 04:46

## 2023-02-10 RX ADMIN — ATENOLOL 25 MG: 50 TABLET ORAL at 20:42

## 2023-02-10 RX ADMIN — OXYCODONE 10 MG: 5 TABLET ORAL at 20:42

## 2023-02-10 RX ADMIN — DOCUSATE SODIUM 100 MG: 100 CAPSULE, LIQUID FILLED ORAL at 17:54

## 2023-02-10 RX ADMIN — SODIUM CHLORIDE: 9 INJECTION, SOLUTION INTRAVENOUS at 15:20

## 2023-02-10 RX ADMIN — HYDROMORPHONE HYDROCHLORIDE 0.5 MG: 1 INJECTION, SOLUTION INTRAMUSCULAR; INTRAVENOUS; SUBCUTANEOUS at 08:05

## 2023-02-10 RX ADMIN — ASPIRIN 81 MG: 81 TABLET, COATED ORAL at 20:44

## 2023-02-10 RX ADMIN — ATORVASTATIN CALCIUM 80 MG: 40 TABLET, FILM COATED ORAL at 20:42

## 2023-02-10 RX ADMIN — HYOSCYAMINE SULFATE 125 MCG: 0.12 TABLET ORAL; SUBLINGUAL at 17:55

## 2023-02-10 RX ADMIN — FENOFIBRATE 160 MG: 160 TABLET ORAL at 20:42

## 2023-02-10 RX ADMIN — OXYCODONE 10 MG: 5 TABLET ORAL at 15:19

## 2023-02-10 RX ADMIN — PANTOPRAZOLE SODIUM 40 MG: 40 TABLET, DELAYED RELEASE ORAL at 04:47

## 2023-02-10 RX ADMIN — DOCUSATE SODIUM 100 MG: 100 CAPSULE, LIQUID FILLED ORAL at 20:42

## 2023-02-10 RX ADMIN — SODIUM CHLORIDE: 9 INJECTION, SOLUTION INTRAVENOUS at 04:47

## 2023-02-10 RX ADMIN — OXYCODONE 10 MG: 5 TABLET ORAL at 10:13

## 2023-02-10 RX ADMIN — AMITRIPTYLINE HYDROCHLORIDE 50 MG: 50 TABLET, FILM COATED ORAL at 20:41

## 2023-02-10 ASSESSMENT — PAIN SCALES - GENERAL
PAINLEVEL_OUTOF10: 0
PAINLEVEL_OUTOF10: 9
PAINLEVEL_OUTOF10: 10
PAINLEVEL_OUTOF10: 2
PAINLEVEL_OUTOF10: 3
PAINLEVEL_OUTOF10: 7
PAINLEVEL_OUTOF10: 0
PAINLEVEL_OUTOF10: 7
PAINLEVEL_OUTOF10: 10
PAINLEVEL_OUTOF10: 0

## 2023-02-10 ASSESSMENT — PAIN DESCRIPTION - DESCRIPTORS
DESCRIPTORS: ACHING
DESCRIPTORS: TENDER
DESCRIPTORS: SHOOTING
DESCRIPTORS: ACHING

## 2023-02-10 ASSESSMENT — PAIN DESCRIPTION - LOCATION
LOCATION: FLANK
LOCATION: ABDOMEN
LOCATION: BACK
LOCATION: FLANK
LOCATION: FLANK

## 2023-02-10 ASSESSMENT — PAIN - FUNCTIONAL ASSESSMENT: PAIN_FUNCTIONAL_ASSESSMENT: ACTIVITIES ARE NOT PREVENTED

## 2023-02-10 ASSESSMENT — PAIN DESCRIPTION - ORIENTATION
ORIENTATION: LEFT

## 2023-02-10 ASSESSMENT — PAIN DESCRIPTION - PAIN TYPE: TYPE: ACUTE PAIN;SURGICAL PAIN

## 2023-02-10 NOTE — OP NOTE
[facility]  OPERATIVE REPORT    Name:  German Guillen  MR#:  [id number]  :  1965  ACCOUNT #:  [de-identified]  DATE OF SERVICE:  2023    PREOPERATIVE DIAGNOSIS:  Left renal calculi. POSTOPERATIVE DIAGNOSIS:  Left renal calculi. PROCEDURE PERFORMED:  Left percutaneous nephrostolithotomy, left antegrade stent placement, left antegrade nephrostogram with interpretation of nephrostogram.    SURGEON:  Ryan Gan. Baltazar García MD    ASSISTANT:  None. ANESTHESIA:  General.    COMPLICATIONS:  None. SPECIMENS REMOVED:  Stone for analysis. IMPLANTS:  Left ureteral stent. ESTIMATED BLOOD LOSS:  75 mL. FINDINGS:  Large stone within a mid bob in the left kidney, triangular in shape, measuring 22 mm in maximum diameter. Approximately, three stones in the inferior collecting system each of which measures 3 mm. DESCRIPTION OF PROCEDURE:  The patient had gone to Interventional Radiology and percutaneous access had been achieved through the left flank. He was brought to the OR, given a general anesthetic. A Nichols catheter was placed. He was placed in the prone position and prepped and draped in a sterile fashion for percutaneous renal surgery. The patient had a single ureteral catheter which was capped. It was going from the left flank inferior to the costal margin and went down to the bladder. We used C-arm fluoroscopy. I passed an Amplatz SuperStiff guidewire through the ureteral catheter down in the bladder. The catheter was removed leaving the wire in place. We then passed a coaxial catheter over that wire removing the inner catheter. A safety wire which was a stiff body 0.038 floppy-tip guidewire was then passed as well. Working wire was the Amplatz wire. We passed a NephroMax dilating balloon. The wire entered the inferior bob and the largest stone as noted was within the mid bob. I dilated the tract to 12 atmospheres.     The sheath was then passed over the balloon and positioned in the inferior bob. Balloon was deflated and removed. The rigid nephroscope was passed. I immediately saw two small stones, each of which measured about 4-5 mm in size. These were broken up and removed using the CyberWand device. I was able to pass a CyberWand device in a cephalad direction and entered the stone-bearing bob. There was a wide infundibulum which aided in this process. We gradually broke the stone up into smaller pieces and I was able to suction out all of the stones within the mid bob. At the end of the procedure, we backloaded the Amplatz wire into the scope and passed a 7-Tamazight 28-cm long double-J stent successfully. There was a good coil within the bladder and in the kidney. We saw a small 4-mm stone in the renal pelvis. This was grasped with forceps and removed and sent for chemical analysis. We did not see any other stones despite maximum magnification and direct vision. At this point, a 22-Tamazight [place] catheter was passed through the sheath and positioned in the renal pelvis. Antegrade pyelogram was performed. INTERPRETATION OF PYELOGRAM:  Contrast was injected through the Nichols. It showed the tip was within the renal pelvis. I inflated the balloon with 2 mL of water. The sheath was cut off and removed from the catheter. The catheter was sewn to the skin with nylon. We removed the safety wire. Catheter was irrigated of clots. The patient was extubated and taken to recovery room in stable condition.         MD ROXANE [id number]  D:  02/09/2023 18:05  T:  02/10/2023 0:44  JOB #:  [id number]

## 2023-02-10 NOTE — CARE COORDINATION
RNCM met with patient in room 619 to discuss discharge planning. Patient lives with spouse in one level home with 2 steps for entry. Patient has a CPAP at home, but no other DME. Patient is independent with ADLs at baseline and drives. No history of HH or rehab. Demographics and PCP verified. CM following.       02/10/23 9038   Service Assessment   Patient Orientation Alert and Oriented   Cognition Alert   History Provided By Patient   Primary Caregiver Self   Support Systems Spouse/Significant Other   Patient's Healthcare Decision Maker is: Legal Next of Kin   PCP Verified by CM Yes   Last Visit to PCP Within last 3 months   Prior Functional Level Independent in ADLs/IADLs   Current Functional Level Independent in ADLs/IADLs   Can patient return to prior living arrangement Yes   Ability to make needs known: Good   Family able to assist with home care needs: Yes   Would you like for me to discuss the discharge plan with any other family members/significant others, and if so, who? Yes   Community Resources None   Social/Functional History   Lives With Spouse;Daughter   Type of [facility] One level   Home Access Stairs to enter without rails   Entrance Stairs - Number of Steps 2   Receives Help From Family   ADL Assistance Independent   Ambulation Assistance Independent   Transfer Assistance Independent   Active  Yes   Occupation Full time employment   Discharge [facility] Prior To Admission None   Potential Assistance Needed N/A   DME Ordered?  No   Potential Assistance Purchasing Medications No   Type of Home Care Services None   Patient expects to be discharged to: House   One/Two Story Residence One story   History of falls? 0

## 2023-02-10 NOTE — PROGRESS NOTES
Admit Date: 2/9/2023      Subjective:     Ligia Vasques is POD 1  Procedure(s):  NEPHROLITHOTOMY PERCUTANEOUS LEFT IR TUBE PLACEMENT 1PM      Complains of 8/10 pain. Denies N/V. Reports limited ambulation d/t pain. Reports relief with medication. Objective:     Patient Vitals for the past 8 hrs:   BP Temp Temp src Pulse Resp SpO2   02/10/23 0724 116/85 99 °F (37.2 °C) Oral 71 12 98 %     02/10 0701 - 02/10 1900  In: -   Out: 9068 [CTESY:3866]  02/08 1901 - 02/10 0700  In: 1103 [P.O.:240; I.V.:800]  Out: 1315 [Urine:1310]    Physical Exam:  GENERAL ASSESSMENT: alert, oriented to person, place and time, no acute distress and no anxiety, depression or agitation  Chest: clear to auscultation, no wheezes, rales or rhonchi, symmetric air entry. CVS exam: normal rate, regular rhythm, normal S1, S2, no murmurs, rubs, clicks or gallops. ABDOMEN: soft, tender, left flank tenderness. Neurological exam reveals alert, oriented, normal speech, no focal findings or movement disorder noted.   FEMALE GENITOURINARY EXAM: not done  MALE GENITAL EXAM: not done    Data Review   Recent Results (from the past 24 hour(s))   POCT INR    Collection Time: 02/09/23 11:43 AM   Result Value Ref Range    POC Protime 13.0 (H) 9.6 - 11.6 SECS    POC INR 1.1 0.9 - 1.2     POCT Glucose    Collection Time: 02/09/23 11:44 AM   Result Value Ref Range    POC Glucose 118 (H) 65 - 100 mg/dL    Performed by: Joseph    APTT    Collection Time: 02/09/23 11:52 AM   Result Value Ref Range    PTT 25.8 24.5 - 34.2 SEC   Protime-INR    Collection Time: 02/09/23 11:52 AM   Result Value Ref Range    Protime 13.8 12.6 - 14.3 sec    INR 1.0     POCT Glucose    Collection Time: 02/09/23  8:43 PM   Result Value Ref Range    POC Glucose 122 (H) 65 - 100 mg/dL    Performed by: Zeb    POCT Glucose    Collection Time: 02/10/23  7:22 AM   Result Value Ref Range    POC Glucose 110 (H) 65 - 100 mg/dL    Performed by: Pantera Assessment:     Principal Problem:    Kidney stone  Active Problems:    Renal stone  Resolved Problems:    * No resolved hospital problems. *    Nichols draing with UOP pink. Nephrostomy draining pink. VSS. Pre-Op Diagnosis: Kidney stone [N20.0]    Post-Op Diagnosis:  * No post-op diagnosis entered *    Procedures: Procedure(s):  S/P NEPHROLITHOTOMY PERCUTANEOUS LEFT IR TUBE PLACEMENT 1PM      Plan: Nichols out this am.  Pain management per MAR. Will get KUB. Get CBC, BMP.       Signed By: KRISTI Riddle     February 10, 2023      [facility]

## 2023-02-10 NOTE — PROGRESS NOTES
Patient is on CPAP (RESMED). No respiratory distress is noted.     02/09/23 4047   NIV Type   $NIV $Daily Charge   Skin Assessment Clean, dry, & intact   Skin Protection for O2 Device No   NIV Started/Stopped On   Equipment Type Resmed   Mode Auto-PAP   Mask Type Full face mask   Mask Size Medium   Settings/Measurements   CPAP/EPAP 6 cmH2O   Resp 17   Mask Leak (lpm)   (mask fits well)   Comfort Level Good   Using Accessory Muscles No   SpO2 96   Patient's Home Machine No

## 2023-02-10 NOTE — PERIOP NOTE
TRANSFER - OUT REPORT:    Verbal report given to  on Tanika Suarez  being transferred to  for routine post-op       Report consisted of patients Situation, Background, Assessment and   Recommendations(SBAR). Information from the following report(s) Nurse Handoff Report, Index, and Intake/Output was reviewed with the receiving nurse. Lines:   Peripheral IV 02/09/23 Right Hand (Active)   Site Assessment Clean, dry & intact 02/09/23 1840   Line Status Flushed 02/09/23 1840   Phlebitis Assessment No symptoms 02/09/23 1840   Infiltration Assessment 0 02/09/23 1840   Alcohol Cap Used No 02/09/23 1840   Dressing Status Clean, dry & intact 02/09/23 1840        Opportunity for questions and clarification was provided. Patient transported with:   Tech    VTE prophylaxis orders have been written for Tanika Suarez. Patient and family given floor number and nurses name. Family updated re: pt status after security code verified.

## 2023-02-10 NOTE — PROGRESS NOTES
TRANSFER - IN REPORT:    Verbal report received from [address] on Roberta Oconnor  being received from PACU for routine progression of patient care      Report consisted of patient's Situation, Background, Assessment and   Recommendations(SBAR). Information from the following report(s) Nurse Handoff Report was reviewed with the receiving nurse. Opportunity for questions and clarification was provided. Assessment completed upon patient's arrival to unit and care assumed.

## 2023-02-10 NOTE — PLAN OF CARE
Problem: Chronic Conditions and Co-morbidities  Goal: Patient's chronic conditions and co-morbidity symptoms are monitored and maintained or improved  Outcome: Progressing  Flowsheets (Taken 2/9/2023 2015)  Care Plan - Patient's Chronic Conditions and Co-Morbidity Symptoms are Monitored and Maintained or Improved:   Monitor and assess patient's chronic conditions and comorbid symptoms for stability, deterioration, or improvement   Collaborate with multidisciplinary team to address chronic and comorbid conditions and prevent exacerbation or deterioration     Problem: Safety - Adult  Goal: Free from fall injury  Outcome: Progressing     Problem: Pain  Goal: Verbalizes/displays adequate comfort level or baseline comfort level  Outcome: Progressing     Problem: Discharge Planning  Goal: Discharge to home or other facility with appropriate resources  Outcome: Progressing  Flowsheets (Taken 2/9/2023 2015)  Discharge to home or other facility with appropriate resources: Identify barriers to discharge with patient and caregiver     Problem: Respiratory - Adult  Goal: Achieves optimal ventilation and oxygenation  Outcome: Progressing     Problem: Skin/Tissue Integrity - Adult  Goal: Skin integrity remains intact  Outcome: Progressing  Goal: Incisions, wounds, or drain sites healing without S/S of infection  Outcome: Progressing  Goal: Oral mucous membranes remain intact  Outcome: Progressing     Problem: Gastrointestinal - Adult  Goal: Minimal or absence of nausea and vomiting  Outcome: Progressing  Goal: Maintains or returns to baseline bowel function  Outcome: Progressing  Goal: Maintains adequate nutritional intake  Outcome: Progressing  Goal: Establish and maintain optimal ostomy function  Outcome: Progressing     Problem: Genitourinary - Adult  Goal: Absence of urinary retention  Outcome: Progressing  Goal: Urinary catheter remains patent  Outcome: Progressing     Problem: Infection - Adult  Goal: Absence of infection at discharge  Outcome: Progressing  Goal: Absence of infection during hospitalization  Outcome: Progressing  Goal: Absence of fever/infection during anticipated neutropenic period  Outcome: Progressing     Problem: Metabolic/Fluid and Electrolytes - Adult  Goal: Electrolytes maintained within normal limits  Outcome: Progressing  Goal: Hemodynamic stability and optimal renal function maintained  Outcome: Progressing  Goal: Glucose maintained within prescribed range  Outcome: Progressing     Problem: Hematologic - Adult  Goal: Maintains hematologic stability  Outcome: Progressing

## 2023-02-11 VITALS
DIASTOLIC BLOOD PRESSURE: 68 MMHG | RESPIRATION RATE: 12 BRPM | WEIGHT: 227.6 LBS | HEIGHT: 70 IN | BODY MASS INDEX: 32.58 KG/M2 | HEART RATE: 77 BPM | SYSTOLIC BLOOD PRESSURE: 125 MMHG | TEMPERATURE: 99.9 F | OXYGEN SATURATION: 95 %

## 2023-02-11 LAB
GLUCOSE BLD STRIP.AUTO-MCNC: 114 MG/DL (ref 65–100)
SERVICE CMNT-IMP: ABNORMAL

## 2023-02-11 PROCEDURE — 6370000000 HC RX 637 (ALT 250 FOR IP): Performed by: NURSE PRACTITIONER

## 2023-02-11 PROCEDURE — G0378 HOSPITAL OBSERVATION PER HR: HCPCS

## 2023-02-11 PROCEDURE — 99238 HOSP IP/OBS DSCHRG MGMT 30/<: CPT | Performed by: UROLOGY

## 2023-02-11 PROCEDURE — 82962 GLUCOSE BLOOD TEST: CPT

## 2023-02-11 PROCEDURE — 99024 POSTOP FOLLOW-UP VISIT: CPT | Performed by: UROLOGY

## 2023-02-11 PROCEDURE — 6370000000 HC RX 637 (ALT 250 FOR IP): Performed by: UROLOGY

## 2023-02-11 RX ORDER — CIPROFLOXACIN 500 MG/1
500 TABLET, FILM COATED ORAL 2 TIMES DAILY
Qty: 10 TABLET | Refills: 0 | Status: SHIPPED | OUTPATIENT
Start: 2023-02-11 | End: 2023-02-16

## 2023-02-11 RX ORDER — OXYCODONE HYDROCHLORIDE 5 MG/1
5 TABLET ORAL EVERY 6 HOURS PRN
Qty: 12 TABLET | Refills: 0 | Status: SHIPPED | OUTPATIENT
Start: 2023-02-11 | End: 2023-02-14

## 2023-02-11 RX ORDER — HYOSCYAMINE SULFATE 0.12 MG/1
1 TABLET SUBLINGUAL EVERY 4 HOURS PRN
Qty: 30 EACH | Refills: 1 | Status: SHIPPED | OUTPATIENT
Start: 2023-02-11

## 2023-02-11 RX ADMIN — PANTOPRAZOLE SODIUM 40 MG: 40 TABLET, DELAYED RELEASE ORAL at 06:49

## 2023-02-11 RX ADMIN — DOCUSATE SODIUM 100 MG: 100 CAPSULE, LIQUID FILLED ORAL at 09:48

## 2023-02-11 RX ADMIN — OXYCODONE 10 MG: 5 TABLET ORAL at 07:23

## 2023-02-11 ASSESSMENT — PAIN DESCRIPTION - ORIENTATION: ORIENTATION: LEFT

## 2023-02-11 ASSESSMENT — PAIN DESCRIPTION - DESCRIPTORS: DESCRIPTORS: ACHING

## 2023-02-11 ASSESSMENT — PAIN SCALES - GENERAL
PAINLEVEL_OUTOF10: 7
PAINLEVEL_OUTOF10: 0

## 2023-02-11 ASSESSMENT — PAIN DESCRIPTION - LOCATION: LOCATION: BACK

## 2023-02-11 NOTE — CARE COORDINATION
Patient is medically stable for discharge. Patient will be getting discharged home with no supportive care services at this time. ASSESSMENT NOTE    Attending Physician: Jewel Hughes MD  Admit Problem: Kidney stone [N20.0]  Renal stone [N20.0]  Date/Time of Admission: 2/9/2023 10:45 AM  Problem List:  Patient Active Problem List   Diagnosis    Hyperlipemia    Hepatomegaly    Arthritis    GERD (gastroesophageal reflux disease)    Chronic kidney disease    Essential hypertension, benign    Reflux esophagitis    Severe obesity (HCC)    Type 2 diabetes mellitus without complication, without long-term current use of insulin (HCC)    KATERINA on CPAP    Kidney stone    Renal stone       Service Assessment  Patient Orientation Alert and Oriented   Cognition Alert   History Provided By Patient   Primary Caregiver Self   Accompanied By/Relationship     Support Systems Spouse/Significant Other   Patient's Healthcare Decision Maker is: Legal Next of Kin   PCP Verified by CM Yes   Last Visit to PCP Within last 3 months   Prior Functional Level Independent in ADLs/IADLs   Current Functional Level Independent in ADLs/IADLs   Can patient return to prior living arrangement Yes   Ability to make needs known: Good   Family able to assist with home care needs: Yes   Would you like for me to discuss the discharge plan with any other family members/significant others, and if so, who?  Yes   Financial Resources     Community Resources None   CM/SW Referral       Social/Functional History  Lives With Spouse, Daughter   Type of [address] One level   [facility] [address] to enter without rails   Entrance Stairs - Number of Steps 2   Entrance Stairs - Rails     Bathroom Shower/Tub     Bathroom Toilet     Bathroom Equipment     Bathroom Accessibility     Home Equipment     Receives Help From [place]     Meal Prep     Laundry     Vacuuming     Cleaning     Gardening     Yard Work     Driving     Shopping          Other (Comment)     Homemaking Responsibilities     Meal Prep Responsibility     Laundry Responsibility     Cleaning Responsibility     Bill Paying/Finance [address] Management     Other (Comment)     Ambulation Assistance Independent   Transfer Assistance Independent   Active  Yes   Patient's  Info     Mode of Transportation     Education     Occupation Full time employment   Type of Occupation       Discharge Planning   Type of [place] Prashant Prior To Admission None   Potential Assistance Needed N/A   DME     DME     DME Ordered? No   Potential Assistance Purchasing Medications No   Meds-to-Beds: Does the patient want to have any new prescriptions delivered to bedside prior to discharge? Type of Home Care Services None   Patient expects to be discharged to: House   Follow Up Appointment: Best Day/Time     One/Two Story Residence: One story   # of Interior Steps     Height of Each Step (in)     [employer] Available     History of Falls? No     Services At/After Discharge  Transition of Care Consult (CM Consult): Internal Home Health     Internal Hospice     Reason Outside Agency [address]     Partner SNF     Reason Why Partner SNF Not Chosen     Internal Comfort Care     Reason Outside [address] Discharge     [address] Provided? Mode [facility] Transport at [facility] Time of Discharge     Confirm Follow Up Transport       Condition of Participation: Discharge Planning  The plan for Transition of Care is related to the following treatment goals:      The Patient and/or Patient Representative was provided with a Choice of Provider? Name of the Patient Representative who was provided with the Choice of Provider and agrees with the Discharge Plan? The Patient and/or Patient Representative Agree with the Discharge Plan? Freedom of Choice list was provided with basic dialogue that supports the individualized plan of care/goals, treatment preferences, and shares the quality data associated with the providers?        Documentation for Discharge Appeal  Discharge Appealed by     Date notified by QIO of appeal request:     Time notified by QIO of appeal request:     Detailed Notice of Discharge given to:     Date Notice of Discharge given:     Time Notice of Discharge given:     Date records sent to QIO     Time records sent to Gloria Claudio     Date Notified of Outcome     Time Notified of Outcome     Outcome of appeal           [facility] 02/11/23 10:12 AM

## 2023-02-11 NOTE — PROGRESS NOTES
Urology Progress Note    Admit Date: 2/9/2023    Subjective:     Patient has no new complaints. Ready to discharge home today. Pain controlled. Tolerating diet. Voiding    Objective:     Patient Vitals for the past 8 hrs:   BP Temp Temp src Pulse Resp SpO2   02/11/23 0755 125/68 99.9 °F (37.7 °C) Oral 77 12 95 %   02/11/23 0436 108/67 98.4 °F (36.9 °C) Oral 74 12 96 %     02/11 0701 - 02/11 1900  In: 90 [P.O.:90]  Out: -   02/09 1901 - 02/11 0700  In: 240 [P.O.:240]  Out: 0094 [Urine:3175]    Physical Exam:   /68   Pulse 77   Temp 99.9 °F (37.7 °C) (Oral)   Resp 12   Ht 5' 10\" (1.778 m)   Wt 227 lb 9.6 oz (103.2 kg)   SpO2 95%   BMI 32.66 kg/m²      GENERAL: No acute distress, Awake, Alert, Oriented X 3, Gait normal  CARDIAC: regular rate and rhythm  CHEST AND LUNG: Easy work of breathing, clear to auscultation bilaterally, no cyanosis  ABDOMEN: soft, non tender, non-distended, positive bowel sounds, no organomegaly, no palpable masses, no guarding, no rebound tenderness  : NT in place with pink urine.            Data Review   Recent Results (from the past 24 hour(s))   POCT Glucose    Collection Time: 02/10/23 11:23 AM   Result Value Ref Range    POC Glucose 118 (H) 65 - 100 mg/dL    Performed by: Pantera    CBC with Auto Differential    Collection Time: 02/10/23  1:15 PM   Result Value Ref Range    WBC 13.8 (H) 4.3 - 11.1 K/uL    RBC 4.81 4.23 - 5.6 M/uL    Hemoglobin 14.6 13.6 - 17.2 g/dL    Hematocrit 44.8 41.1 - 50.3 %    MCV 93.1 82 - 102 FL    MCH 30.4 26.1 - 32.9 PG    MCHC 32.6 31.4 - 35.0 g/dL    RDW 13.4 11.9 - 14.6 %    Platelets 956 756 - 369 K/uL    MPV 9.5 9.4 - 12.3 FL    nRBC 0.00 0.0 - 0.2 K/uL    Differential Type AUTOMATED      Seg Neutrophils 75 43 - 78 %    Lymphocytes 16 13 - 44 %    Monocytes 8 4.0 - 12.0 %    Eosinophils % 1 0.5 - 7.8 %    Basophils 0 0.0 - 2.0 %    Immature Granulocytes 0 0.0 - 5.0 %    Segs Absolute 10.4 (H) 1.7 - 8.2 K/UL    Absolute Lymph # 2.2 0.5 - 4.6 K/UL    Absolute Mono # 1.1 0.1 - 1.3 K/UL    Absolute Eos # 0.1 0.0 - 0.8 K/UL    Basophils Absolute 0.1 0.0 - 0.2 K/UL    Absolute Immature Granulocyte 0.1 0.0 - 0.5 K/UL   Basic Metabolic Panel    Collection Time: 02/10/23  1:15 PM   Result Value Ref Range    Sodium 139 133 - 143 mmol/L    Potassium 4.2 3.5 - 5.1 mmol/L    Chloride 107 101 - 110 mmol/L    CO2 28 21 - 32 mmol/L    Anion Gap 4 2 - 11 mmol/L    Glucose 161 (H) 65 - 100 mg/dL    BUN 14 6 - 23 MG/DL    Creatinine 1.20 0.8 - 1.5 MG/DL    Est, Glom Filt Rate >60 >60 ml/min/1.73m2    Calcium 8.7 8.3 - 10.4 MG/DL   POCT Glucose    Collection Time: 02/10/23  3:51 PM   Result Value Ref Range    POC Glucose 126 (H) 65 - 100 mg/dL    Performed by: RosaleeAlphaSmartPCT    POCT Glucose    Collection Time: 02/10/23  8:38 PM   Result Value Ref Range    POC Glucose 150 (H) 65 - 100 mg/dL    Performed by: ViPCT    POCT Glucose    Collection Time: 02/11/23  7:53 AM   Result Value Ref Range    POC Glucose 114 (H) 65 - 100 mg/dL    Performed by: MaggiSalesconxPCT            Assessment:     Principal Problem:    Kidney stone  Active Problems:    Renal stone  Resolved Problems:    * No resolved hospital problems. *    POD 2 s/p L PCNL. Doing well. Plan:     -NT out today  -Regular diet  -SLIV  -PO pain control  -OOB/Ambulate  -Dispo: D/C home today. Follow up 7-10 day sfor cysto/stent pull      Sal Garcia M.D.     [facility]rology  [facility] [address], [address] [facility] [address]  [place], [address]  Phone: [phone]  Fax: [phone]

## 2023-02-11 NOTE — PROGRESS NOTES
[facility] 6 MED SURG  [address] [address]  Phone: [phone]             February 11, 2023    Patient: Chasidy Camacho   YOB: 1965   Date of Visit: 2/9/2023       To Whom It May Concern:    Karli Ospina was seen and treated in our facility  beginning 2/9/2023 until 2/11/2023. He may return to work on 2/25/2023. Patient currently with restrictions. Patient unable to lift more than 15 pounds and no strenuous activity for the next 2 weeks .       Sincerely,       Roverto Avila RN         Signature:__________________________________

## 2023-02-11 NOTE — DISCHARGE SUMMARY
Physician Discharge Summary    Name: Emerson Trent  Medical Record Number: [id number]       Account Number:  [de-identified]  YOB: 1965                         Age:  62 y.o. Admit date:  2/9/2023                    Discharge date:  2/11/2023    Attending Physician:  Amber Emerson            Service: Urology    Physician Summary completed by: Jamaal Quiñonez. Dov Weathers MD    Reason for hospitalization: Left Kidney Stone    Significant PMH:   Past Medical History:   Diagnosis Date    Diabetes ([facility]     type 2- oral agents- does not check glucose; denies hypoglycemia; Generalized osteoarthrosis, unspecified site 9/7/2013    knees    GERD (gastroesophageal reflux disease)     prn meds    Hepatomegaly 9/7/2013    History of athlete's foot     History of cerebral venous sinus thrombosis associated with congenital heart disease     Pt denies any knowledge of this dx 2019- Pt denies ever having this dx    History of kidney stones     Hodgkin lymphoma ([facility] [address]     dx 2001- no recurrence- chemo and stem cell     Hyperlipidemia     Hypertension     medication    OA (osteoarthritis)     knees    Obstructive sleep apnea (adult) (pediatric) 9/7/2013    uses CPAP    Prediabetes        Allergies: Allergies   Allergen Reactions    Penicillins Rash    Sulfa Antibiotics Rash       Brief Hospital Course: The patient was admitted and had the procedure listed below performed without difficulty. His hospital course progressed as expected. No acute events occurred throughout his hospital stay. Nichols catheter and left nephrostomy tube were removed prior to discharge. He was discharged in stable condition. Will return in 7-10 days for cysto/stent removal in clinic with Dr. Balbina Cavazos.     Admission Physical Exam notable for:    Left Flank Pain    Admission Lab/Radiology studies notable for:   Left Kidney Stone    Surgical Procedures:  Left percutaneous nephrolithotomy    Condition at Discharge: Stable    Discharge Diagnoses: Kidney stone [N20.0]  Renal stone [N20.0]    Significant Diagnostic Studies and Procedures:  Noted in brief hospital course.     Consults:  None    Patient Disposition: home       Patient instructions/medications:    Current Facility-Administered Medications:     hyoscyamine (LEVSIN/SL) sublingual tablet 125 mcg, 125 mcg, SubLINGual, Q4H PRN, Yesenia Lea NP-C, 125 mcg at 02/10/23 1755    docusate sodium (COLACE) capsule 100 mg, 100 mg, Oral, BID, Aicha Tirado NP-C, 100 mg at 02/11/23 0948    aspirin EC tablet 81 mg, 81 mg, Oral, Nightly, Catherene Bound., MD, 81 mg at 02/10/23 2044    amitriptyline (ELAVIL) tablet 50 mg, 50 mg, Oral, Nightly, Catherene Bound., MD, 50 mg at 02/10/23 2041    atenolol (TENORMIN) tablet 25 mg, 25 mg, Oral, Nightly, Catherene Bound., MD, 25 mg at 02/10/23 2042    Dapagliflozin-metFORMIN HCl ER 5-1000 MG TB24- pt supplied (Patient Supplied), 1 tablet, Oral, Nightly, Catherene Bound., MD    atorvastatin (LIPITOR) tablet 80 mg, 80 mg, Oral, Nightly, Catherene Bound., MD, 80 mg at 02/10/23 2042    pantoprazole (PROTONIX) tablet 40 mg, 40 mg, Oral, QAM AC, Catherene Bound., MD, 40 mg at 02/11/23 0649    fenofibrate (TRIGLIDE) tablet 160 mg, 160 mg, Oral, Nightly, Catherene Bound., MD, 160 mg at 02/10/23 2042    acetaminophen (TYLENOL) tablet 650 mg, 650 mg, Oral, Q4H PRN, Catherene Bound., MD    oxyCODONE (ROXICODONE) immediate release tablet 5 mg, 5 mg, Oral, Q4H PRN **OR** oxyCODONE (ROXICODONE) immediate release tablet 10 mg, 10 mg, Oral, Q4H PRN, Catherene Bound., MD, 10 mg at 02/11/23 0723    HYDROmorphone HCl PF (DILAUDID) injection 0.5 mg, 0.5 mg, IntraVENous, Q3H PRN, Catherene Bound., MD, 0.5 mg at 02/10/23 0805    ondansetron [facility]) injection 4 mg, 4 mg, IntraVENous, Q6H PRN, Catherene Bound., MD    insulin lispro (HUMALOG) injection vial 0-8 Units, 0-8 Units, SubCUTAneous, TID WC, Chiqui Olson., MD    insulin lispro (HUMALOG) injection vial 0-4 Units, 0-4 Units, SubCUTAneous, Nightly, Sallie Dudley MD    glucose chewable tablet 16 g, 4 tablet, Oral, PRN, Sallie Dudley MD    dextrose bolus 10% 125 mL, 125 mL, IntraVENous, PRN **OR** dextrose bolus 10% 250 mL, 250 mL, IntraVENous, PRNSallie MD    glucagon (rDNA) injection 1 mg, 1 mg, SubCUTAneous, PRN, Sallie Dudley MD    dextrose 10 % infusion, , IntraVENous, Continuous PRN, Sallie Dudley MD    Pending items needing follow up: Sanaz Barbosa was instructed to follow up in 7-10 days for cystoscopy + stent removal.    Signed:  Chay Aguilar M.D. [facility]rology  [facility], [facility] [address]  Phone: [phone]  Fax: [phone]    cc:  Primary Care Physician:  Verified  Referring physicians:     Additional provider(s):

## 2023-02-11 NOTE — DISCHARGE INSTRUCTIONS
Please call my office at [phone] if you develop fever, uncontrolled pain, inability to urinate or have other have other symptoms/concerns. You will need to change your back incision dressing as needed with  gauze / tape and at a minimum twice per day until drainage stops. Drainage is normal and will get less each day. You can shower with normal soap/water daily but do not submerge incision underwater until healed    No lifting > 15 pounds or strenuous activity for 2 weeks. Please remain off work for 2 weeks.

## 2023-02-11 NOTE — PROGRESS NOTES
Discharge instructions given. Education provided. All questions answered and verbally voiced understanding. Nephro tube removed as ordered. Pt aware of incision care. Medication changes and follow up appointments discussed. Pt aware to  medications. AVS reviewed, signed, and placed in chart. Copy provided for pt. Pt spouse aware of discharge and is planning to transport pt home. Pt aware to call desk when ready to discharge.

## 2023-02-13 ENCOUNTER — APPOINTMENT (RX ONLY)
Dept: URBAN - METROPOLITAN AREA CLINIC 329 | Facility: CLINIC | Age: 58
Setting detail: DERMATOLOGY
End: 2023-02-13

## 2023-02-13 DIAGNOSIS — L82.1 OTHER SEBORRHEIC KERATOSIS: ICD-10-CM

## 2023-02-13 DIAGNOSIS — D485 NEOPLASM OF UNCERTAIN BEHAVIOR OF SKIN: ICD-10-CM

## 2023-02-13 DIAGNOSIS — L57.8 OTHER SKIN CHANGES DUE TO CHRONIC EXPOSURE TO NONIONIZING RADIATION: ICD-10-CM

## 2023-02-13 PROBLEM — D48.5 NEOPLASM OF UNCERTAIN BEHAVIOR OF SKIN: Status: ACTIVE | Noted: 2023-02-13

## 2023-02-13 PROCEDURE — ? COUNSELING

## 2023-02-13 PROCEDURE — 99203 OFFICE O/P NEW LOW 30 MIN: CPT | Mod: 25

## 2023-02-13 PROCEDURE — ? BIOPSY BY SHAVE METHOD

## 2023-02-13 PROCEDURE — ? OTHER

## 2023-02-13 PROCEDURE — 69100 BIOPSY OF EXTERNAL EAR: CPT

## 2023-02-13 PROCEDURE — ? SUNSCREEN RECOMMENDATIONS

## 2023-02-13 ASSESSMENT — LOCATION DETAILED DESCRIPTION DERM
LOCATION DETAILED: RIGHT CENTRAL MALAR CHEEK
LOCATION DETAILED: LEFT ANTERIOR DISTAL UPPER ARM
LOCATION DETAILED: LEFT DISTAL DORSAL FOREARM
LOCATION DETAILED: RIGHT SUPERIOR CRUS OF ANTIHELIX
LOCATION DETAILED: RIGHT VENTRAL PROXIMAL FOREARM
LOCATION DETAILED: LEFT CENTRAL MALAR CHEEK
LOCATION DETAILED: RIGHT DISTAL RADIAL DORSAL FOREARM

## 2023-02-13 ASSESSMENT — LOCATION SIMPLE DESCRIPTION DERM
LOCATION SIMPLE: LEFT UPPER ARM
LOCATION SIMPLE: LEFT CHEEK
LOCATION SIMPLE: RIGHT EAR
LOCATION SIMPLE: LEFT FOREARM
LOCATION SIMPLE: RIGHT FOREARM
LOCATION SIMPLE: RIGHT CHEEK

## 2023-02-13 ASSESSMENT — LOCATION ZONE DERM
LOCATION ZONE: EAR
LOCATION ZONE: ARM
LOCATION ZONE: FACE

## 2023-02-13 NOTE — HPI: EVALUATION OF SKIN LESION(S)
What Type Of Note Output Would You Prefer (Optional)?: Bullet Format
Hpi Title: Evaluation of Skin Lesions
How Severe Are Your Spot(S)?: moderate
Have Your Spot(S) Been Treated In The Past?: has not been treated
Additional History: Recently had kidney stone removed

## 2023-02-13 NOTE — PROCEDURE: OTHER
Note Text (......Xxx Chief Complaint.): This diagnosis correlates with the
Detail Level: Zone
Other (Free Text): Discussed MOHS briefly if it is a skin cancer
Render Risk Assessment In Note?: no

## 2023-02-13 NOTE — PROCEDURE: MIPS QUALITY
Quality 110: Preventive Care And Screening: Influenza Immunization: Influenza immunization was not ordered or administered, reason not given
Quality 431: Preventive Care And Screening: Unhealthy Alcohol Use - Screening: Patient not identified as an unhealthy alcohol user when screened for unhealthy alcohol use using a systematic screening method
Quality 402: Tobacco Use And Help With Quitting Among Adolescents: Patient screened for tobacco and never smoked
Detail Level: Zone

## 2023-02-15 LAB
CALCIUM OXALATE DIHYDRATE MFR STONE IR: 30 %
COLOR STONE: NORMAL
COM MFR STONE: 70 %
LABORATORY COMMENT REPORT: NORMAL
Lab: NORMAL
Lab: NORMAL
PHOTO: NORMAL
SIZE STONE: NORMAL MM
SPECIMEN SOURCE: NORMAL
STONE COMPOSITION: NORMAL
WT STONE: 65 MG

## 2023-02-21 ENCOUNTER — APPOINTMENT (RX ONLY)
Dept: URBAN - METROPOLITAN AREA CLINIC 329 | Facility: CLINIC | Age: 58
Setting detail: DERMATOLOGY
End: 2023-02-21

## 2023-02-21 DIAGNOSIS — L57.0 ACTINIC KERATOSIS: ICD-10-CM

## 2023-02-21 DIAGNOSIS — L82.1 OTHER SEBORRHEIC KERATOSIS: ICD-10-CM

## 2023-02-21 DIAGNOSIS — D18.0 HEMANGIOMA: ICD-10-CM

## 2023-02-21 DIAGNOSIS — D22 MELANOCYTIC NEVI: ICD-10-CM

## 2023-02-21 PROBLEM — D23.71 OTHER BENIGN NEOPLASM OF SKIN OF RIGHT LOWER LIMB, INCLUDING HIP: Status: ACTIVE | Noted: 2023-02-21

## 2023-02-21 PROBLEM — D22.5 MELANOCYTIC NEVI OF TRUNK: Status: ACTIVE | Noted: 2023-02-21

## 2023-02-21 PROBLEM — D18.01 HEMANGIOMA OF SKIN AND SUBCUTANEOUS TISSUE: Status: ACTIVE | Noted: 2023-02-21

## 2023-02-21 PROCEDURE — ? FULL BODY SKIN EXAM

## 2023-02-21 PROCEDURE — ? LIQUID NITROGEN

## 2023-02-21 PROCEDURE — 99213 OFFICE O/P EST LOW 20 MIN: CPT | Mod: 25

## 2023-02-21 PROCEDURE — ? COUNSELING

## 2023-02-21 PROCEDURE — 17000 DESTRUCT PREMALG LESION: CPT

## 2023-02-21 ASSESSMENT — LOCATION DETAILED DESCRIPTION DERM
LOCATION DETAILED: LEFT MEDIAL UPPER BACK
LOCATION DETAILED: RIGHT CYMBA CONCHA
LOCATION DETAILED: LEFT INFERIOR UPPER BACK
LOCATION DETAILED: RIGHT MEDIAL UPPER BACK

## 2023-02-21 ASSESSMENT — LOCATION ZONE DERM
LOCATION ZONE: TRUNK
LOCATION ZONE: EAR

## 2023-02-21 ASSESSMENT — LOCATION SIMPLE DESCRIPTION DERM
LOCATION SIMPLE: LEFT UPPER BACK
LOCATION SIMPLE: RIGHT EAR
LOCATION SIMPLE: RIGHT UPPER BACK

## 2023-02-21 NOTE — PROCEDURE: MIPS QUALITY
Quality 130: Documentation Of Current Medications In The Medical Record: Current Medications Documented
Detail Level: Zone
Quality 226: Preventive Care And Screening: Tobacco Use: Screening And Cessation Intervention: Patient screened for tobacco use and is an ex/non-smoker
Quality 431: Preventive Care And Screening: Unhealthy Alcohol Use - Screening: Patient not identified as an unhealthy alcohol user when screened for unhealthy alcohol use using a systematic screening method
Quality 110: Preventive Care And Screening: Influenza Immunization: Influenza Immunization Administered during Influenza season

## 2023-02-22 ENCOUNTER — PROCEDURE VISIT (OUTPATIENT)
Dept: UROLOGY | Age: 58
End: 2023-02-22

## 2023-02-22 DIAGNOSIS — N20.0 KIDNEY STONE: Primary | ICD-10-CM

## 2023-02-22 LAB
BILIRUBIN, URINE, POC: NEGATIVE
BLOOD URINE, POC: NORMAL
GLUCOSE URINE, POC: 1000
KETONES, URINE, POC: NEGATIVE
LEUKOCYTE ESTERASE, URINE, POC: NEGATIVE
NITRITE, URINE, POC: NEGATIVE
PH, URINE, POC: 5.5 (ref 4.6–8)
PROTEIN,URINE, POC: 300
SPECIFIC GRAVITY, URINE, POC: 1.03 (ref 1–1.03)
URINALYSIS CLARITY, POC: NORMAL
URINALYSIS COLOR, POC: NORMAL
UROBILINOGEN, POC: NORMAL

## 2023-02-22 NOTE — LETTER
[facility]  [facility]  Phone: [phone]  Fax: [phone]    Renzo Woods MD        February 22, 2023     Patient: Pete Cintron   YOB: 1965   Date of Visit: 2/22/2023       To Whom it May Concern:    Shavonne Yi was seen in my clinic on 2/22/2023. He may return to work on 2-23-23 with no restrictions. If you have any questions or concerns, please don't hesitate to call.     Sincerely,         Renzo Woods MD

## 2023-02-22 NOTE — PROGRESS NOTES
[facility]  [address] [address] [address]  [phone]    Magnus Hernandez  : 1965         HPI   62 y.o., male Patient returns today for follow-up after undergoing a left ESWL. Patient had large stone in the mid to upper pole of the left kidney measuring 19 x 15 mm. It appeared that a total of 3000 shockwaves were given there was minimal change in the stone after lithotripsy. Patient is back today for postop visit and also to discuss other treatment options available. Overall he is doing well has not had any pain discomfort and has not passed any stone fragments. No fever chills have been reported. S/p left PNL 23:    FINDINGS:  Large stone within a mid bob in the left kidney, triangular in shape, measuring 22 mm in maximum diameter. Approximately, three stones in the inferior collecting system each of which measures 3 mm. Stone analysis:   Ca Oxalate,Dihydrate % 30    CALCIUM OXALATE MONOHYDRATE % 70    KUB today: stent on left, 3 mm ca2++ in left lower kidney. Here for KUB, cysto , stent removal.     Past Medical History:   Diagnosis Date    Diabetes ([address]     type 2- oral agents- does not check glucose; denies hypoglycemia;     Generalized osteoarthrosis, unspecified site 2013    knees    GERD (gastroesophageal reflux disease)     prn meds    Hepatomegaly 2013    History of athlete's foot     History of cerebral venous sinus thrombosis associated with congenital heart disease     Pt denies any knowledge of this dx - Pt denies ever having this dx    History of kidney stones     Hodgkin lymphoma ([address]     dx - no recurrence- chemo and stem cell     Hyperlipidemia     Hypertension     medication    OA (osteoarthritis)     knees    Obstructive sleep apnea (adult) (pediatric) 2013    uses CPAP    Prediabetes      Past Surgical History:   Procedure Laterality Date    CATARACT REMOVAL Bilateral 2022    COLONOSCOPY N/A 2017 COLONOSCOPY / BMI=31 performed by Dawson Bonner MD at [address]  12/11/2017         IR NEPHROSTOMY PERCUTANEOUS LEFT  2/9/2023    IR NEPHROSTOMY PERCUTANEOUS LEFT 2/9/2023 [facility]    KIDNEY STONE REMOVAL Left 2/9/2023    NEPHROLITHOTOMY PERCUTANEOUS LEFT IR TUBE PLACEMENT 1PM performed by Sylvia Catherine MD at [facility] MAIN OR    LITHOTRIPSY Left 01/05/2023    ESWL EXTRACORPOREAL SHOCK WAVE LITHOTRIPSY, LEFT performed by Sylvia Catherine MD at [facility] MAIN OR    LYMPH NODE DISSECTION      OTHER SURGICAL HISTORY      Biopsy    SINUS SURGERY PROC UNLISTED      VASCULAR SURGERY      port insertion and removal - chemo     Current Outpatient Medications   Medication Sig Dispense Refill    Hyoscyamine Sulfate SL (LEVSIN/SL) 0.125 MG SUBL Place 1 tablet under the tongue every 4 hours as needed (bladder spasms) 30 each 1    amitriptyline (ELAVIL) 50 MG tablet Take 1 tablet by mouth nightly For sleep/neuropathy 90 tablet 3    atenolol (TENORMIN) 25 MG tablet Take 1 tablet by mouth daily For blood pressure (Patient taking differently: Take 25 mg by mouth at bedtime For blood pressure) 90 tablet 3    celecoxib (CELEBREX) 200 MG capsule Take 1 capsule by mouth 2 times daily With food for pain 180 capsule 3    Dapagliflozin-metFORMIN HCl ER (XIGDUO XR) 5-1000 MG TB24 Take 1 tablet by mouth daily With food for diabetes (Patient taking differently: Take 1 tablet by mouth nightly With food for diabetes) 90 tablet 3    clotrimazole-betamethasone (LOTRISONE) 1-0.05 % cream Apply to 2 times per day for fungal treatment (athlete's foot) (Patient taking differently: 2 times daily as needed Apply to 2 times per day for fungal treatment (athlete's foot)) 45 g 5    omeprazole (PRILOSEC) 40 MG delayed release capsule Take 1 capsule by mouth daily On empty stomach and wait at least 30 minutes before eating daily for stomach reflux (Patient taking differently: Take 40 mg by mouth daily as needed On empty stomach and wait at least 30 minutes before eating daily for stomach reflux) 90 capsule 3    atorvastatin (LIPITOR) 80 MG tablet Take 1 tablet by mouth nightly For cholesterol 90 tablet 3    fenofibrate (TRIGLIDE) 160 MG tablet Take 1 tablet by mouth daily For cholesterol (Patient taking differently: Take 160 mg by mouth nightly For cholesterol) 90 tablet 3    ondansetron (ZOFRAN) 8 MG tablet Take 1 tablet by mouth every 8 hours as needed for Nausea or Vomiting 15 tablet 0    triamcinolone (ARISTOCORT) 0.5 % cream Apply topically 2 times daily For itching/poison ivy/oak rash (Patient taking differently: Apply topically 2 times daily as needed For itching/poison ivy/oak rash) 60 g 2    CPAP Machine MISC 15 cm every night      aspirin 81 MG EC tablet Take 81 mg by mouth nightly preventative       No current facility-administered medications for this visit.      Allergies   Allergen Reactions    Penicillins Rash    Sulfa Antibiotics Rash     Social History     Socioeconomic History    Marital status:      Spouse name: Not on file    Number of children: Not on file    Years of education: Not on file    Highest education level: Not on file   Occupational History    Not on file   Tobacco Use    Smoking status: Never    Smokeless tobacco: Never   Vaping Use    Vaping Use: Never used   Substance and Sexual Activity    Alcohol use: No    Drug use: No    Sexual activity: Not on file   Other Topics Concern    Not on file   Social History Narrative    Not on file     Social Determinants of Health     Financial Resource Strain: Low Risk     Difficulty of Paying Living Expenses: Not hard at all   Food Insecurity: No Food Insecurity    Worried About Running Out of Food in the Last Year: Never true    Ran Out of Food in the Last Year: Never true   Transportation Needs: Not on file   Physical Activity: Not on file   Stress: Not on file   Social Connections: Not on file   Intimate Partner Violence: Not on file   Housing Stability: Not on file     Family History   Problem Relation Age of Onset    Heart Attack Mother 61        MI    Elevated Lipids Mother     Hypertension Mother     Heart Disease Mother     No Known Problems Father     No Known Problems Sister     Mult Sclerosis Sister     Cancer Sister        There were no vitals taken for this visit. UA - Dipstick  Results for orders placed or performed in visit on 02/22/23   AMB POC URINALYSIS DIP STICK AUTO W/O MICRO   Result Value Ref Range    Color (UA POC)      Clarity (UA POC)      Glucose, Urine, POC 1000 Negative    Bilirubin, Urine, POC Negative Negative    KETONES, Urine, POC Negative Negative    Specific Gravity, Urine, POC 1.030 1.001 - 1.035    Blood (UA POC) Large Negative    pH, Urine, POC 5.5 4.6 - 8.0    Protein, Urine,  Negative    Urobilinogen, POC Normal     Nitrite, Urine, POC Negative Negative    Leukocyte Esterase, Urine, POC Negative Negative       UA - Micro  WBC - 0  RBC - 0  Bacteria - 0  Epith - 0          Cystoscopy Procedure:     Procedure Room # 1  Scope ID: Disposable   Assistant: Bart        All risks, benefits and alternatives were again reviewed with patient and he is willing to proceed at this time. His genital area was prepped and draped and a sterile field applied. 2% lidocaine jelly was injected in the the urethra and allowed to dwell for several minutes. A flexible cystoscope was then inserted into the urethral meatus and advanced under direct vision. The anterior and posterior urethra appeared normal in appearance. The prostatic urethra was open without significant hypertrophy. The bladder was systematically surveyed. No bladder trabeculations were seen. No mucosal abnormalities were seen. The ureteral orifices were seen in their normal orthotopic position. Removed left ureteral stent using flexible forceps. The cystoscope was then removed under direct vision.   The patient tolerated the procedure well. Assessment and Plan    ICD-10-CM    1. Kidney stone  N20.0 XR ABDOMEN (KUB) (SINGLE AP VIEW)     AMB POC URINALYSIS DIP STICK AUTO W/O MICRO     CYSTOSCOPY,REMV CALCULUS,SIMPLE     CANCELED: CYSTOURETHROSCOPY      Removed stent today. Discussed metabolic workup but he doesn't want to do it. Return in about 1 year (around 2/22/2024) for KUB.

## 2023-02-28 ENCOUNTER — TELEPHONE (OUTPATIENT)
Dept: FAMILY MEDICINE CLINIC | Facility: CLINIC | Age: 58
End: 2023-02-28

## 2023-02-28 NOTE — TELEPHONE ENCOUNTER
Patient requested recent labs to be sent to  [phone] [facility]" for all diabetic supplies.

## 2023-04-03 NOTE — ANESTHESIA POSTPROCEDURE EVALUATION
Department of Anesthesiology  Postprocedure Note    Patient: Anny Snell  MRN: [id number]  YOB: 1965  Date of evaluation: 2/9/2023      Procedure Summary     Date: 02/09/23 Room / Location: [facility] MAIN OR 03 / [facility] MAIN OR    Anesthesia Start: 1636 Anesthesia Stop: 1809    Procedure: NEPHROLITHOTOMY PERCUTANEOUS LEFT IR TUBE PLACEMENT 1PM (Left: Back) Diagnosis:       Kidney stone      (Kidney stone [N20.0])    Providers: Jean Griffin MD Responsible Provider: Channing Winter MD    Anesthesia Type: General ASA Status: 2          Anesthesia Type: General    Edgar Phase I: Edgar Score: 8    Edgar Phase II:        Anesthesia Post Evaluation    Patient location during evaluation: PACU  Patient participation: complete - patient participated  Level of consciousness: awake and alert  Airway patency: patent  Nausea & Vomiting: no nausea and no vomiting  Complications: no  Cardiovascular status: hemodynamically stable  Respiratory status: acceptable, nonlabored ventilation and spontaneous ventilation  Hydration status: euvolemic  Comments: BP (!) 121/58   Pulse 73   Temp 97 °F (36.1 °C) (Temporal)   Resp 14   Ht 5' 10\" (1.778 m)   Wt 227 lb 9.6 oz (103.2 kg)   SpO2 97%   BMI 32.66 kg/m²     Multimodal analgesia pain management approach Report called to Elsa MOSLEY

## 2023-05-22 ENCOUNTER — APPOINTMENT (RX ONLY)
Dept: URBAN - METROPOLITAN AREA CLINIC 329 | Facility: CLINIC | Age: 58
Setting detail: DERMATOLOGY
End: 2023-05-22

## 2023-05-22 DIAGNOSIS — H61.03 CHONDRITIS OF EXTERNAL EAR: ICD-10-CM | Status: INADEQUATELY CONTROLLED

## 2023-05-22 PROBLEM — H61.031 CHONDRITIS OF RIGHT EXTERNAL EAR: Status: ACTIVE | Noted: 2023-05-22

## 2023-05-22 PROCEDURE — ? PRESCRIPTION MEDICATION MANAGEMENT

## 2023-05-22 PROCEDURE — ? COUNSELING

## 2023-05-22 PROCEDURE — ? PRESCRIPTION

## 2023-05-22 PROCEDURE — 99213 OFFICE O/P EST LOW 20 MIN: CPT

## 2023-05-22 RX ORDER — CLOBETASOL PROPIONATE 0.5 MG/G
OINTMENT TOPICAL
Qty: 15 | Refills: 1 | Status: ERX | COMMUNITY
Start: 2023-05-22

## 2023-05-22 RX ADMIN — CLOBETASOL PROPIONATE: 0.5 OINTMENT TOPICAL at 00:00

## 2023-05-22 ASSESSMENT — LOCATION ZONE DERM: LOCATION ZONE: EAR

## 2023-05-22 ASSESSMENT — LOCATION DETAILED DESCRIPTION DERM: LOCATION DETAILED: RIGHT INFERIOR CRUS OF ANTIHELIX

## 2023-05-22 ASSESSMENT — LOCATION SIMPLE DESCRIPTION DERM: LOCATION SIMPLE: RIGHT EAR

## 2023-05-22 NOTE — PROCEDURE: PRESCRIPTION MEDICATION MANAGEMENT
Detail Level: Zone
Plan: Use doughnut bandaids or some sort of pressure relief when sleeping. If not better, will rebiopsy.
Initiate Treatment: Clobetasol ointment bid
Render In Strict Bullet Format?: No

## 2023-07-24 ENCOUNTER — NURSE ONLY (OUTPATIENT)
Dept: FAMILY MEDICINE CLINIC | Facility: CLINIC | Age: 58
End: 2023-07-24
Payer: COMMERCIAL

## 2023-07-24 DIAGNOSIS — E83.42 HYPOMAGNESEMIA: ICD-10-CM

## 2023-07-24 DIAGNOSIS — E55.9 VITAMIN D DEFICIENCY: ICD-10-CM

## 2023-07-24 DIAGNOSIS — M10.9 GOUT, UNSPECIFIED CAUSE, UNSPECIFIED CHRONICITY, UNSPECIFIED SITE: ICD-10-CM

## 2023-07-24 DIAGNOSIS — E78.00 PURE HYPERCHOLESTEROLEMIA: ICD-10-CM

## 2023-07-24 DIAGNOSIS — E11.40 CONTROLLED TYPE 2 DIABETES MELLITUS WITH NEUROPATHY (HCC): ICD-10-CM

## 2023-07-24 DIAGNOSIS — E53.8 B12 DEFICIENCY: ICD-10-CM

## 2023-07-24 DIAGNOSIS — I10 ESSENTIAL HYPERTENSION, BENIGN: ICD-10-CM

## 2023-07-24 DIAGNOSIS — Z12.5 SPECIAL SCREENING FOR MALIGNANT NEOPLASM OF PROSTATE: ICD-10-CM

## 2023-07-24 DIAGNOSIS — E11.40 CONTROLLED TYPE 2 DIABETES MELLITUS WITH NEUROPATHY (HCC): Primary | ICD-10-CM

## 2023-07-24 LAB
25(OH)D3 SERPL-MCNC: 37.9 NG/ML (ref 30–100)
ALBUMIN SERPL-MCNC: 4.2 G/DL (ref 3.5–5)
ALBUMIN/GLOB SERPL: 1.3 (ref 0.4–1.6)
ALP SERPL-CCNC: 62 U/L (ref 50–136)
ALT SERPL-CCNC: 72 U/L (ref 12–65)
ANION GAP SERPL CALC-SCNC: 5 MMOL/L (ref 2–11)
AST SERPL-CCNC: 60 U/L (ref 15–37)
BASOPHILS # BLD: 0.1 K/UL (ref 0–0.2)
BASOPHILS NFR BLD: 1 % (ref 0–2)
BILIRUB SERPL-MCNC: 0.6 MG/DL (ref 0.2–1.1)
BUN SERPL-MCNC: 18 MG/DL (ref 6–23)
CALCIUM SERPL-MCNC: 9.5 MG/DL (ref 8.3–10.4)
CHLORIDE SERPL-SCNC: 111 MMOL/L (ref 101–110)
CHOLEST SERPL-MCNC: 147 MG/DL
CO2 SERPL-SCNC: 26 MMOL/L (ref 21–32)
CREAT SERPL-MCNC: 1.2 MG/DL (ref 0.8–1.5)
DIFFERENTIAL METHOD BLD: ABNORMAL
EOSINOPHIL # BLD: 0.4 K/UL (ref 0–0.8)
EOSINOPHIL NFR BLD: 5 % (ref 0.5–7.8)
ERYTHROCYTE [DISTWIDTH] IN BLOOD BY AUTOMATED COUNT: 14.2 % (ref 11.9–14.6)
GLOBULIN SER CALC-MCNC: 3.2 G/DL (ref 2.8–4.5)
GLUCOSE SERPL-MCNC: 122 MG/DL (ref 65–100)
HCT VFR BLD AUTO: 50.6 % (ref 41.1–50.3)
HDLC SERPL-MCNC: 29 MG/DL (ref 40–60)
HDLC SERPL: 5.1
HGB BLD-MCNC: 16.2 G/DL (ref 13.6–17.2)
IMM GRANULOCYTES # BLD AUTO: 0 K/UL (ref 0–0.5)
IMM GRANULOCYTES NFR BLD AUTO: 0 % (ref 0–5)
LDLC SERPL CALC-MCNC: 89.6 MG/DL
LYMPHOCYTES # BLD: 2.5 K/UL (ref 0.5–4.6)
LYMPHOCYTES NFR BLD: 33 % (ref 13–44)
MAGNESIUM SERPL-MCNC: 2.4 MG/DL (ref 1.8–2.4)
MCH RBC QN AUTO: 29.7 PG (ref 26.1–32.9)
MCHC RBC AUTO-ENTMCNC: 32 G/DL (ref 31.4–35)
MCV RBC AUTO: 92.7 FL (ref 82–102)
MONOCYTES # BLD: 0.6 K/UL (ref 0.1–1.3)
MONOCYTES NFR BLD: 8 % (ref 4–12)
NEUTS SEG # BLD: 4 K/UL (ref 1.7–8.2)
NEUTS SEG NFR BLD: 53 % (ref 43–78)
NRBC # BLD: 0 K/UL (ref 0–0.2)
PLATELET # BLD AUTO: 317 K/UL (ref 150–450)
PMV BLD AUTO: 10.2 FL (ref 9.4–12.3)
POTASSIUM SERPL-SCNC: 4.8 MMOL/L (ref 3.5–5.1)
PROT SERPL-MCNC: 7.4 G/DL (ref 6.3–8.2)
PSA SERPL-MCNC: 1.5 NG/ML
RBC # BLD AUTO: 5.46 M/UL (ref 4.23–5.6)
SODIUM SERPL-SCNC: 142 MMOL/L (ref 133–143)
TRIGL SERPL-MCNC: 142 MG/DL (ref 35–150)
TSH W FREE THYROID IF ABNORMAL: 2.65 UIU/ML (ref 0.36–3.74)
URATE SERPL-MCNC: 3.8 MG/DL (ref 2.6–6)
VIT B12 SERPL-MCNC: 316 PG/ML (ref 193–986)
VLDLC SERPL CALC-MCNC: 28.4 MG/DL (ref 6–23)
WBC # BLD AUTO: 7.6 K/UL (ref 4.3–11.1)

## 2023-07-24 PROCEDURE — 36415 COLL VENOUS BLD VENIPUNCTURE: CPT | Performed by: NURSE PRACTITIONER

## 2023-07-25 LAB
EST. AVERAGE GLUCOSE BLD GHB EST-MCNC: 126 MG/DL
HBA1C MFR BLD: 6 % (ref 4.8–5.6)

## 2023-07-31 ENCOUNTER — OFFICE VISIT (OUTPATIENT)
Dept: FAMILY MEDICINE CLINIC | Facility: CLINIC | Age: 58
End: 2023-07-31
Payer: COMMERCIAL

## 2023-07-31 VITALS
HEART RATE: 67 BPM | DIASTOLIC BLOOD PRESSURE: 84 MMHG | OXYGEN SATURATION: 98 % | SYSTOLIC BLOOD PRESSURE: 116 MMHG | BODY MASS INDEX: 31.01 KG/M2 | HEIGHT: 70 IN | WEIGHT: 216.6 LBS

## 2023-07-31 DIAGNOSIS — I73.9 CLAUDICATION (HCC): ICD-10-CM

## 2023-07-31 DIAGNOSIS — I10 ESSENTIAL HYPERTENSION, BENIGN: ICD-10-CM

## 2023-07-31 DIAGNOSIS — M10.9 GOUT, UNSPECIFIED CAUSE, UNSPECIFIED CHRONICITY, UNSPECIFIED SITE: ICD-10-CM

## 2023-07-31 DIAGNOSIS — E55.9 VITAMIN D DEFICIENCY: ICD-10-CM

## 2023-07-31 DIAGNOSIS — B35.3 TINEA PEDIS OF BOTH FEET: ICD-10-CM

## 2023-07-31 DIAGNOSIS — E78.5 HYPERLIPIDEMIA LDL GOAL <70: ICD-10-CM

## 2023-07-31 DIAGNOSIS — E11.40 CONTROLLED TYPE 2 DIABETES MELLITUS WITH NEUROPATHY (HCC): Primary | ICD-10-CM

## 2023-07-31 LAB
BILIRUBIN, URINE, POC: NEGATIVE
BLOOD URINE, POC: ABNORMAL
GLUCOSE URINE, POC: ABNORMAL
KETONES, URINE, POC: NEGATIVE
LEUKOCYTE ESTERASE, URINE, POC: NEGATIVE
MICROALB/CREAT RATIO, POC: ABNORMAL
MICROALBUMIN URINE, POC: 50 MG/L
NITRITE, URINE, POC: NEGATIVE
PH, URINE, POC: 5 (ref 4.6–8)
PROTEIN,URINE, POC: NEGATIVE
SPECIFIC GRAVITY, URINE, POC: 1.02 (ref 1–1.03)
URINALYSIS CLARITY, POC: CLEAR
URINALYSIS COLOR, POC: YELLOW
UROBILINOGEN, POC: NORMAL

## 2023-07-31 PROCEDURE — 3074F SYST BP LT 130 MM HG: CPT | Performed by: NURSE PRACTITIONER

## 2023-07-31 PROCEDURE — 99214 OFFICE O/P EST MOD 30 MIN: CPT | Performed by: NURSE PRACTITIONER

## 2023-07-31 PROCEDURE — 3044F HG A1C LEVEL LT 7.0%: CPT | Performed by: NURSE PRACTITIONER

## 2023-07-31 PROCEDURE — 3079F DIAST BP 80-89 MM HG: CPT | Performed by: NURSE PRACTITIONER

## 2023-07-31 RX ORDER — EZETIMIBE 10 MG/1
10 TABLET ORAL DAILY
Qty: 90 TABLET | Refills: 3 | Status: SHIPPED | OUTPATIENT
Start: 2023-07-31

## 2023-07-31 RX ORDER — CLOTRIMAZOLE AND BETAMETHASONE DIPROPIONATE 10; .64 MG/G; MG/G
CREAM TOPICAL
Qty: 90 G | Refills: 11 | Status: SHIPPED | OUTPATIENT
Start: 2023-07-31

## 2023-07-31 SDOH — ECONOMIC STABILITY: HOUSING INSECURITY
IN THE LAST 12 MONTHS, WAS THERE A TIME WHEN YOU DID NOT HAVE A STEADY PLACE TO SLEEP OR SLEPT IN A SHELTER (INCLUDING NOW)?: NO

## 2023-07-31 SDOH — ECONOMIC STABILITY: INCOME INSECURITY: HOW HARD IS IT FOR YOU TO PAY FOR THE VERY BASICS LIKE FOOD, HOUSING, MEDICAL CARE, AND HEATING?: SOMEWHAT HARD

## 2023-07-31 SDOH — ECONOMIC STABILITY: FOOD INSECURITY: WITHIN THE PAST 12 MONTHS, YOU WORRIED THAT YOUR FOOD WOULD RUN OUT BEFORE YOU GOT MONEY TO BUY MORE.: NEVER TRUE

## 2023-07-31 SDOH — ECONOMIC STABILITY: FOOD INSECURITY: WITHIN THE PAST 12 MONTHS, THE FOOD YOU BOUGHT JUST DIDN'T LAST AND YOU DIDN'T HAVE MONEY TO GET MORE.: NEVER TRUE

## 2023-07-31 ASSESSMENT — ENCOUNTER SYMPTOMS
TROUBLE SWALLOWING: 0
FACIAL SWELLING: 0
EYE PAIN: 0
SINUS PAIN: 0
GASTROINTESTINAL NEGATIVE: 1
EYE REDNESS: 0
CONSTIPATION: 0
STRIDOR: 0
COUGH: 0
ALLERGIC/IMMUNOLOGIC NEGATIVE: 1
PHOTOPHOBIA: 0
ABDOMINAL DISTENTION: 0
EYE DISCHARGE: 0
BACK PAIN: 0
NAUSEA: 0
SORE THROAT: 0
EYE ITCHING: 0
VOICE CHANGE: 0
SHORTNESS OF BREATH: 0
APNEA: 0
CHEST TIGHTNESS: 0
RECTAL PAIN: 0
COLOR CHANGE: 0
EYES NEGATIVE: 1
ANAL BLEEDING: 0
DIARRHEA: 0
RESPIRATORY NEGATIVE: 1
BLOOD IN STOOL: 0
RHINORRHEA: 0
SINUS PRESSURE: 0
VOMITING: 0
ABDOMINAL PAIN: 0
CHOKING: 0
WHEEZING: 0

## 2023-07-31 ASSESSMENT — PATIENT HEALTH QUESTIONNAIRE - PHQ9
2. FEELING DOWN, DEPRESSED OR HOPELESS: 0
SUM OF ALL RESPONSES TO PHQ QUESTIONS 1-9: 0
SUM OF ALL RESPONSES TO PHQ9 QUESTIONS 1 & 2: 0
SUM OF ALL RESPONSES TO PHQ QUESTIONS 1-9: 0
1. LITTLE INTEREST OR PLEASURE IN DOING THINGS: 0

## 2023-07-31 NOTE — PROGRESS NOTES
Vitamin D 25 Hydroxy     Standing Status:   Future     Standing Expiration Date:   7/31/2024    Hemoglobin A1C     Standing Status:   Future     Standing Expiration Date:   7/31/2024    Lipid Panel     Standing Status:   Future     Standing Expiration Date:   7/31/2024    Comprehensive Metabolic Panel     Standing Status:   Future     Standing Expiration Date:   7/31/2024    TSH with Reflex     Standing Status:   Future     Standing Expiration Date:   7/31/2024    CBC with Auto Differential     Standing Status:   Future     Standing Expiration Date:   7/31/2024    VALERI  [facility]     Referral Priority:   Routine     Referral Type:   Eval and Treat     Referral Reason:   Specialty Services Required     Referral Location:   [address] [place] [address]     Requested Specialty:   Ophthalmology     Number of Visits Requested:   1    AMB POC URINALYSIS DIP STICK AUTO W/O MICRO     Standing Status:   Future     Standing Expiration Date:   7/31/2024    AMB POC URINE, MICROALBUMIN     Standing Status:   Future     Standing Expiration Date:   7/31/2024    [facility] DIABETES FOOT EXAM    Vascular duplex lower extremity arteries bilateral     Standing Status:   Future     Standing Expiration Date:   7/31/2024         Elements of this note have been dictated using speech recognition software. As a result, errors of speech recognition may have occurred. On this date 7/31/2023 I have spent 34 minutes reviewing previous notes, test results and face to face with the patient discussing the diagnosis and importance of compliance with the treatment plan as well as documenting on the day of the visit. Greater than 50% of this visit was spent counseling the patient about test results, prognosis, importance of compliance, education about disease process, benefits of medications, instructions for management of acute symptoms, and follow up plans. Return in about 6 months (around 1/31/2024) for Physical with fasting labs prior.      Shirley Chung

## 2023-07-31 NOTE — PATIENT INSTRUCTIONS
Radiology [phone] to schedule the ultrasound of your legs to check circulation    I have referred you to [facility] for your annual diabetic eye exam. They will call you to schedule an appt. Please continue with your current regimen. I have added a new prescription called Ezetimibe or Zetia 10 mg one daily in addition to your atorvastatin and fenofibrate for cholesterol lowering.

## 2023-08-16 ENCOUNTER — HOSPITAL ENCOUNTER (OUTPATIENT)
Dept: ULTRASOUND IMAGING | Age: 58
Discharge: HOME | End: 2023-08-19
Payer: COMMERCIAL

## 2023-08-16 DIAGNOSIS — E11.40 CONTROLLED TYPE 2 DIABETES MELLITUS WITH NEUROPATHY (HCC): ICD-10-CM

## 2023-08-16 DIAGNOSIS — I73.9 CLAUDICATION (HCC): ICD-10-CM

## 2023-08-16 PROCEDURE — 93925 LOWER EXTREMITY STUDY: CPT

## 2024-01-15 ENCOUNTER — NURSE ONLY (OUTPATIENT)
Dept: FAMILY MEDICINE CLINIC | Facility: CLINIC | Age: 59
End: 2024-01-15
Payer: COMMERCIAL

## 2024-01-15 DIAGNOSIS — E55.9 VITAMIN D DEFICIENCY: ICD-10-CM

## 2024-01-15 DIAGNOSIS — E11.40 CONTROLLED TYPE 2 DIABETES MELLITUS WITH NEUROPATHY (HCC): ICD-10-CM

## 2024-01-15 DIAGNOSIS — E78.5 HYPERLIPIDEMIA LDL GOAL <70: ICD-10-CM

## 2024-01-15 LAB
25(OH)D3 SERPL-MCNC: 34.3 NG/ML (ref 30–100)
ALBUMIN SERPL-MCNC: 4.1 G/DL (ref 3.5–5)
ALBUMIN/GLOB SERPL: 1.1 (ref 0.4–1.6)
ALP SERPL-CCNC: 66 U/L (ref 50–136)
ALT SERPL-CCNC: 80 U/L (ref 12–65)
ANION GAP SERPL CALC-SCNC: 1 MMOL/L (ref 2–11)
AST SERPL-CCNC: 58 U/L (ref 15–37)
BASOPHILS # BLD: 0.1 K/UL (ref 0–0.2)
BASOPHILS NFR BLD: 1 % (ref 0–2)
BILIRUB SERPL-MCNC: 0.6 MG/DL (ref 0.2–1.1)
BILIRUBIN, URINE, POC: NEGATIVE
BLOOD URINE, POC: ABNORMAL
BUN SERPL-MCNC: 14 MG/DL (ref 6–23)
CALCIUM SERPL-MCNC: 9.4 MG/DL (ref 8.3–10.4)
CHLORIDE SERPL-SCNC: 110 MMOL/L (ref 103–113)
CHOLEST SERPL-MCNC: 124 MG/DL
CO2 SERPL-SCNC: 27 MMOL/L (ref 21–32)
CREAT SERPL-MCNC: 1.2 MG/DL (ref 0.8–1.5)
DIFFERENTIAL METHOD BLD: ABNORMAL
EOSINOPHIL # BLD: 0.3 K/UL (ref 0–0.8)
EOSINOPHIL NFR BLD: 4 % (ref 0.5–7.8)
ERYTHROCYTE [DISTWIDTH] IN BLOOD BY AUTOMATED COUNT: 13.5 % (ref 11.9–14.6)
GLOBULIN SER CALC-MCNC: 3.7 G/DL (ref 2.8–4.5)
GLUCOSE SERPL-MCNC: 121 MG/DL (ref 65–100)
GLUCOSE URINE, POC: ABNORMAL
HCT VFR BLD AUTO: 52.4 % (ref 41.1–50.3)
HDLC SERPL-MCNC: 29 MG/DL (ref 40–60)
HDLC SERPL: 4.3
HGB BLD-MCNC: 16.6 G/DL (ref 13.6–17.2)
IMM GRANULOCYTES # BLD AUTO: 0 K/UL (ref 0–0.5)
IMM GRANULOCYTES NFR BLD AUTO: 0 % (ref 0–5)
KETONES, URINE, POC: NEGATIVE
LDLC SERPL CALC-MCNC: 63.2 MG/DL
LEUKOCYTE ESTERASE, URINE, POC: NEGATIVE
LYMPHOCYTES # BLD: 2.1 K/UL (ref 0.5–4.6)
LYMPHOCYTES NFR BLD: 30 % (ref 13–44)
MCH RBC QN AUTO: 29.1 PG (ref 26.1–32.9)
MCHC RBC AUTO-ENTMCNC: 31.7 G/DL (ref 31.4–35)
MCV RBC AUTO: 91.9 FL (ref 82–102)
MICROALB/CREAT RATIO, POC: ABNORMAL
MICROALBUMIN URINE, POC: 100 MG/L
MONOCYTES # BLD: 0.6 K/UL (ref 0.1–1.3)
MONOCYTES NFR BLD: 9 % (ref 4–12)
NEUTS SEG # BLD: 4 K/UL (ref 1.7–8.2)
NEUTS SEG NFR BLD: 56 % (ref 43–78)
NITRITE, URINE, POC: NEGATIVE
NRBC # BLD: 0 K/UL (ref 0–0.2)
PH, URINE, POC: 5 (ref 4.6–8)
PLATELET # BLD AUTO: 298 K/UL (ref 150–450)
PMV BLD AUTO: 9.8 FL (ref 9.4–12.3)
POTASSIUM SERPL-SCNC: 4.5 MMOL/L (ref 3.5–5.1)
PROT SERPL-MCNC: 7.8 G/DL (ref 6.3–8.2)
PROTEIN,URINE, POC: NEGATIVE
RBC # BLD AUTO: 5.7 M/UL (ref 4.23–5.6)
SODIUM SERPL-SCNC: 138 MMOL/L (ref 136–146)
SPECIFIC GRAVITY, URINE, POC: 1.02 (ref 1–1.03)
TRIGL SERPL-MCNC: 159 MG/DL (ref 35–150)
TSH W FREE THYROID IF ABNORMAL: 2.58 UIU/ML (ref 0.36–3.74)
URINALYSIS CLARITY, POC: ABNORMAL
URINALYSIS COLOR, POC: YELLOW
UROBILINOGEN, POC: NORMAL
VLDLC SERPL CALC-MCNC: 31.8 MG/DL (ref 6–23)
WBC # BLD AUTO: 7 K/UL (ref 4.3–11.1)

## 2024-01-15 PROCEDURE — 82043 UR ALBUMIN QUANTITATIVE: CPT | Performed by: NURSE PRACTITIONER

## 2024-01-15 PROCEDURE — 81003 URINALYSIS AUTO W/O SCOPE: CPT | Performed by: NURSE PRACTITIONER

## 2024-01-16 LAB
EST. AVERAGE GLUCOSE BLD GHB EST-MCNC: 137 MG/DL
HBA1C MFR BLD: 6.4 % (ref 4.8–5.6)

## 2024-01-22 ENCOUNTER — OFFICE VISIT (OUTPATIENT)
Dept: FAMILY MEDICINE CLINIC | Facility: CLINIC | Age: 59
End: 2024-01-22
Payer: COMMERCIAL

## 2024-01-22 VITALS
OXYGEN SATURATION: 97 % | BODY MASS INDEX: 32.5 KG/M2 | SYSTOLIC BLOOD PRESSURE: 120 MMHG | HEART RATE: 76 BPM | WEIGHT: 227 LBS | HEIGHT: 70 IN | DIASTOLIC BLOOD PRESSURE: 80 MMHG

## 2024-01-22 DIAGNOSIS — M19.90 ARTHRITIS: ICD-10-CM

## 2024-01-22 DIAGNOSIS — E11.40 CONTROLLED TYPE 2 DIABETES MELLITUS WITH NEUROPATHY (HCC): ICD-10-CM

## 2024-01-22 DIAGNOSIS — Z00.00 ROUTINE GENERAL MEDICAL EXAMINATION AT A HEALTH CARE FACILITY: Primary | ICD-10-CM

## 2024-01-22 DIAGNOSIS — Z12.5 ENCOUNTER FOR PROSTATE CANCER SCREENING: ICD-10-CM

## 2024-01-22 DIAGNOSIS — Z87.442 HISTORY OF NEPHROLITHIASIS: ICD-10-CM

## 2024-01-22 DIAGNOSIS — E55.9 VITAMIN D DEFICIENCY: ICD-10-CM

## 2024-01-22 DIAGNOSIS — G62.9 NEUROPATHY: ICD-10-CM

## 2024-01-22 DIAGNOSIS — E78.00 PURE HYPERCHOLESTEROLEMIA: ICD-10-CM

## 2024-01-22 DIAGNOSIS — I10 ESSENTIAL HYPERTENSION, BENIGN: ICD-10-CM

## 2024-01-22 DIAGNOSIS — B35.3 TINEA PEDIS OF BOTH FEET: ICD-10-CM

## 2024-01-22 PROCEDURE — 3079F DIAST BP 80-89 MM HG: CPT | Performed by: NURSE PRACTITIONER

## 2024-01-22 PROCEDURE — 99396 PREV VISIT EST AGE 40-64: CPT | Performed by: NURSE PRACTITIONER

## 2024-01-22 PROCEDURE — 3074F SYST BP LT 130 MM HG: CPT | Performed by: NURSE PRACTITIONER

## 2024-01-22 PROCEDURE — 99214 OFFICE O/P EST MOD 30 MIN: CPT | Performed by: NURSE PRACTITIONER

## 2024-01-22 RX ORDER — GABAPENTIN 300 MG/1
300 CAPSULE ORAL NIGHTLY
Qty: 90 CAPSULE | Refills: 1 | Status: SHIPPED | OUTPATIENT
Start: 2024-01-22 | End: 2024-07-20

## 2024-01-22 RX ORDER — ATENOLOL 25 MG/1
25 TABLET ORAL NIGHTLY
Qty: 90 TABLET | Refills: 3 | Status: SHIPPED | OUTPATIENT
Start: 2024-01-22

## 2024-01-22 RX ORDER — CELECOXIB 200 MG/1
200 CAPSULE ORAL 2 TIMES DAILY
Qty: 180 CAPSULE | Refills: 3 | Status: SHIPPED | OUTPATIENT
Start: 2024-01-22

## 2024-01-22 RX ORDER — ATORVASTATIN CALCIUM 80 MG/1
80 TABLET, FILM COATED ORAL NIGHTLY
Qty: 90 TABLET | Refills: 3 | Status: SHIPPED | OUTPATIENT
Start: 2024-01-22

## 2024-01-22 RX ORDER — OMEPRAZOLE 40 MG/1
40 CAPSULE, DELAYED RELEASE ORAL DAILY PRN
Qty: 90 CAPSULE | Refills: 3 | Status: SHIPPED | OUTPATIENT
Start: 2024-01-22

## 2024-01-22 RX ORDER — CLOTRIMAZOLE AND BETAMETHASONE DIPROPIONATE 10; .64 MG/G; MG/G
CREAM TOPICAL
Qty: 90 G | Refills: 11 | Status: SHIPPED | OUTPATIENT
Start: 2024-01-22

## 2024-01-22 RX ORDER — DAPAGLIFLOZIN AND METFORMIN HYDROCHLORIDE 5; 1000 MG/1; MG/1
1 TABLET, FILM COATED, EXTENDED RELEASE ORAL NIGHTLY
Qty: 90 TABLET | Refills: 3 | Status: SHIPPED | OUTPATIENT
Start: 2024-01-22

## 2024-01-22 RX ORDER — FENOFIBRATE 160 MG/1
160 TABLET ORAL NIGHTLY
Qty: 90 TABLET | Refills: 3 | Status: SHIPPED | OUTPATIENT
Start: 2024-01-22

## 2024-01-22 RX ORDER — AMITRIPTYLINE HYDROCHLORIDE 50 MG/1
50 TABLET, FILM COATED ORAL NIGHTLY
Qty: 90 TABLET | Refills: 3 | Status: SHIPPED | OUTPATIENT
Start: 2024-01-22

## 2024-01-22 ASSESSMENT — PATIENT HEALTH QUESTIONNAIRE - PHQ9
5. POOR APPETITE OR OVEREATING: 0
4. FEELING TIRED OR HAVING LITTLE ENERGY: 0
6. FEELING BAD ABOUT YOURSELF - OR THAT YOU ARE A FAILURE OR HAVE LET YOURSELF OR YOUR FAMILY DOWN: 0
8. MOVING OR SPEAKING SO SLOWLY THAT OTHER PEOPLE COULD HAVE NOTICED. OR THE OPPOSITE, BEING SO FIGETY OR RESTLESS THAT YOU HAVE BEEN MOVING AROUND A LOT MORE THAN USUAL: 0
2. FEELING DOWN, DEPRESSED OR HOPELESS: 0
1. LITTLE INTEREST OR PLEASURE IN DOING THINGS: 0
SUM OF ALL RESPONSES TO PHQ9 QUESTIONS 1 & 2: 0
3. TROUBLE FALLING OR STAYING ASLEEP: 0
10. IF YOU CHECKED OFF ANY PROBLEMS, HOW DIFFICULT HAVE THESE PROBLEMS MADE IT FOR YOU TO DO YOUR WORK, TAKE CARE OF THINGS AT HOME, OR GET ALONG WITH OTHER PEOPLE: 0
SUM OF ALL RESPONSES TO PHQ QUESTIONS 1-9: 0
7. TROUBLE CONCENTRATING ON THINGS, SUCH AS READING THE NEWSPAPER OR WATCHING TELEVISION: 0
9. THOUGHTS THAT YOU WOULD BE BETTER OFF DEAD, OR OF HURTING YOURSELF: 0
SUM OF ALL RESPONSES TO PHQ QUESTIONS 1-9: 0

## 2024-01-22 ASSESSMENT — ENCOUNTER SYMPTOMS
RECTAL PAIN: 0
SINUS PRESSURE: 0
BLOOD IN STOOL: 0
COLOR CHANGE: 0
WHEEZING: 0
ABDOMINAL DISTENTION: 0
FACIAL SWELLING: 0
DIARRHEA: 0
APNEA: 0
SHORTNESS OF BREATH: 0
CONSTIPATION: 0
EYE DISCHARGE: 0
TROUBLE SWALLOWING: 0
VOICE CHANGE: 0
COUGH: 0
BACK PAIN: 0
NAUSEA: 0
EYES NEGATIVE: 1
SINUS PAIN: 0
RESPIRATORY NEGATIVE: 1
PHOTOPHOBIA: 0
VOMITING: 0
EYE ITCHING: 0
CHOKING: 0
EYE PAIN: 0
ALLERGIC/IMMUNOLOGIC NEGATIVE: 1
GASTROINTESTINAL NEGATIVE: 1
STRIDOR: 0
ABDOMINAL PAIN: 0
EYE REDNESS: 0
SORE THROAT: 0

## 2024-01-22 NOTE — PROGRESS NOTES
above.    5. Tinea pedis of both feet  -     clotrimazole-betamethasone (LOTRISONE) 1-0.05 % cream; Apply to 2 times per day for fungal treatment (athlete's foot), Disp-90 g, R-11, Normal    Continue with current regimen.  Advised patient on avoidance of continuous daily use more than 10 days due to risk of hypopigmentation and thinning of the skin from steroidal cream use.    6. Essential hypertension, benign  -     atenolol (TENORMIN) 25 MG tablet; Take 1 tablet by mouth at bedtime For blood pressure, Disp-90 tablet, R-3Normal  -     Comprehensive Metabolic Panel; Future  -     AMB POC URINALYSIS DIP STICK AUTO W/O MICRO; Future  -     TSH with Reflex; Future    Blood pressure is well-controlled today at 120/80.  Continue with atenolol daily.    7. Pure hypercholesterolemia  -     atorvastatin (LIPITOR) 80 MG tablet; Take 1 tablet by mouth nightly For cholesterol, Disp-90 tablet, R-3Normal  -     Lipid Panel; Future  -     Comprehensive Metabolic Panel; Future    Cholesterol level have improved and is now at goal noted to be at 63.  Continue with current therapy.    8. Arthritis  -     celecoxib (CELEBREX) 200 MG capsule; Take 1 capsule by mouth 2 times daily With food for pain, Disp-180 capsule, R-3Normal    Stable.  Continue current therapy.  Advised patient on importance of increasing hydration of at least 2 L to 1 gallon of fluid per day.    9. Vitamin D deficiency  -     Vitamin D 25 Hydroxy; Future    Vitamin D level stable.  Continue current supplementation.    10. History of nephrolithiasis  -     Stone Analysis; Future    Patient continue to have some kidney stones as he believes.  He has had a lithotripsy and has tried to increase his water intake but not as successful.  He has several collection of kidney stones at home.  His recent UA still showed a 2+ moderate blood.  We will advise him to continue with increasing water intake as close to 1 gallon of water per day as possible.  We will have him at a

## 2024-01-29 DIAGNOSIS — M19.90 ARTHRITIS: ICD-10-CM

## 2024-01-29 DIAGNOSIS — I10 ESSENTIAL HYPERTENSION, BENIGN: ICD-10-CM

## 2024-01-29 DIAGNOSIS — E78.00 PURE HYPERCHOLESTEROLEMIA: ICD-10-CM

## 2024-01-29 DIAGNOSIS — E11.40 CONTROLLED TYPE 2 DIABETES MELLITUS WITH NEUROPATHY (HCC): ICD-10-CM

## 2024-01-29 RX ORDER — AMITRIPTYLINE HYDROCHLORIDE 50 MG/1
TABLET, FILM COATED ORAL
Qty: 90 TABLET | Refills: 3 | OUTPATIENT
Start: 2024-01-29

## 2024-01-29 RX ORDER — CELECOXIB 200 MG/1
CAPSULE ORAL
Qty: 180 CAPSULE | Refills: 3 | OUTPATIENT
Start: 2024-01-29

## 2024-01-29 RX ORDER — DAPAGLIFLOZIN AND METFORMIN HYDROCHLORIDE 5; 1000 MG/1; MG/1
TABLET, FILM COATED, EXTENDED RELEASE ORAL
Qty: 90 TABLET | Refills: 3 | OUTPATIENT
Start: 2024-01-29

## 2024-01-29 RX ORDER — ATORVASTATIN CALCIUM 80 MG/1
TABLET, FILM COATED ORAL
Qty: 90 TABLET | Refills: 3 | OUTPATIENT
Start: 2024-01-29

## 2024-01-29 RX ORDER — ATENOLOL 25 MG/1
TABLET ORAL
Qty: 90 TABLET | Refills: 3 | OUTPATIENT
Start: 2024-01-29

## 2024-02-01 LAB
CALCIUM OXALATE DIHYDRATE MFR STONE IR: 10 %
COLOR STONE: NORMAL
COM MFR STONE: 90 %
LABORATORY COMMENT REPORT: NORMAL
Lab: NORMAL
Lab: NORMAL
PHOTO: NORMAL
SIZE STONE: NORMAL MM
SPECIMEN SOURCE: NORMAL
STONE COMPOSITION: NORMAL
WT STONE: 776 MG

## 2024-02-28 ENCOUNTER — OFFICE VISIT (OUTPATIENT)
Dept: UROLOGY | Age: 59
End: 2024-02-28
Payer: COMMERCIAL

## 2024-02-28 DIAGNOSIS — N20.0 KIDNEY STONE: Primary | ICD-10-CM

## 2024-02-28 LAB
BILIRUBIN, URINE, POC: NEGATIVE
BLOOD URINE, POC: NORMAL
GLUCOSE URINE, POC: >=1000
KETONES, URINE, POC: NEGATIVE
LEUKOCYTE ESTERASE, URINE, POC: NEGATIVE
NITRITE, URINE, POC: NEGATIVE
PH, URINE, POC: 5.5 (ref 4.6–8)
PROTEIN,URINE, POC: NEGATIVE
SPECIFIC GRAVITY, URINE, POC: 1.02 (ref 1–1.03)
URINALYSIS CLARITY, POC: NORMAL
URINALYSIS COLOR, POC: NORMAL
UROBILINOGEN, POC: NORMAL

## 2024-02-28 PROCEDURE — 99213 OFFICE O/P EST LOW 20 MIN: CPT | Performed by: UROLOGY

## 2024-02-28 PROCEDURE — 81003 URINALYSIS AUTO W/O SCOPE: CPT | Performed by: UROLOGY

## 2024-02-28 ASSESSMENT — ENCOUNTER SYMPTOMS: BACK PAIN: 0

## 2024-02-28 NOTE — PROGRESS NOTES
Heart Attack Mother 63        MI    Elevated Lipids Mother     Hypertension Mother     Heart Disease Mother     No Known Problems Father     No Known Problems Sister     Mult Sclerosis Sister     Cancer Sister        Review of Systems  Constitutional:   Negative for fever.  Genitourinary:  Negative for urinary burning.  Musculoskeletal:  Negative for back pain.      There were no vitals taken for this visit.    Urinalysis  UA - Dipstick  Results for orders placed or performed in visit on 02/28/24   AMB POC URINALYSIS DIP STICK AUTO W/O MICRO   Result Value Ref Range    Color (UA POC)      Clarity (UA POC)      Glucose, Urine, POC >=1000     Bilirubin, Urine, POC Negative     KETONES, Urine, POC Negative     Specific Gravity, Urine, POC 1.020 1.001 - 1.035    Blood (UA POC) Moderate     pH, Urine, POC 5.5 4.6 - 8.0    Protein, Urine, POC Negative     Urobilinogen, POC 0.2 mg/dL     Nitrite, Urine, POC Negative     Leukocyte Esterase, Urine, POC Negative    Results for orders placed or performed in visit on 01/15/24   AMB POC URINALYSIS DIP STICK AUTO W/O MICRO   Result Value Ref Range    Color, Urine, POC Yellow     Clarity, Urine, POC Slightly Cloudy     Glucose, Urine, POC 4+     Bilirubin, Urine, POC Negative     Ketones, Urine, POC Negative     Specific Gravity, Urine, POC 1.020 1.001 - 1.035    Blood, Urine, POC 2+     pH, Urine, POC 5.0 4.6 - 8.0    Protein, Urine, POC Negative     Urobilinogen, POC Normal     Nitrite, Urine, POC Negative     Leukocyte Esterase, Urine, POC Negative        UA - Micro  WBC - 0-2  RBC - 0  Bacteria - 0  Epith - 0    Physical Exam    Assessment and Plan  Zane was seen today for follow-up.    Diagnoses and all orders for this visit:    Kidney stone  -     AMB POC URINALYSIS DIP STICK AUTO W/O MICRO  -     XR ABDOMEN (KUB) (SINGLE AP VIEW); Future  -     CT ABDOMEN PELVIS RENAL STONE; Future     Stones appear to be enlarging, especially on the right. Will get CT wo. Call patient

## 2024-03-12 ENCOUNTER — HOSPITAL ENCOUNTER (OUTPATIENT)
Dept: CT IMAGING | Age: 59
Discharge: HOME | End: 2024-03-15
Attending: UROLOGY
Payer: COMMERCIAL

## 2024-03-12 DIAGNOSIS — N20.0 KIDNEY STONE: ICD-10-CM

## 2024-03-12 PROCEDURE — 74176 CT ABD & PELVIS W/O CONTRAST: CPT

## 2024-03-25 ENCOUNTER — TELEPHONE (OUTPATIENT)
Dept: UROLOGY | Age: 59
End: 2024-03-25

## 2024-03-27 ENCOUNTER — TELEPHONE (OUTPATIENT)
Dept: UROLOGY | Age: 59
End: 2024-03-27

## 2024-03-28 ENCOUNTER — TELEPHONE (OUTPATIENT)
Dept: UROLOGY | Age: 59
End: 2024-03-28

## 2024-03-28 ENCOUNTER — NURSE ONLY (OUTPATIENT)
Dept: UROLOGY | Age: 59
End: 2024-03-28

## 2024-03-28 DIAGNOSIS — N20.0 KIDNEY STONE: Primary | ICD-10-CM

## 2024-03-28 DIAGNOSIS — N20.0 KIDNEY STONE: ICD-10-CM

## 2024-03-28 LAB — URATE SERPL-MCNC: 4.7 MG/DL (ref 2.6–6)

## 2024-03-28 NOTE — TELEPHONE ENCOUNTER
Called pt about CT, shows 14 mm stone in left renal pelvis and 10 mm stone in right mid calyx.   No hydronephrosis.   He has no pain.   He is passing frequent stones. He does not want to treat the right renal stone.   Set up metabolic workup and make appt with me in 6 weeks

## 2024-03-28 NOTE — TELEPHONE ENCOUNTER
Pt called to schedule lab work and 6 week follow up with Rory. Lab work scheduled for today in [place], OV is scheduled for 5/13 in [place] due to availability, pt is aware and okay with [place] location and provided with address.

## 2024-03-31 LAB
CALCIUM SERPL-MCNC: 9.4 MG/DL (ref 8.7–10.2)
PTH-INTACT SERPL-MCNC: NORMAL PG/ML (ref 15–65)

## 2024-04-01 DIAGNOSIS — N20.0 KIDNEY STONE: ICD-10-CM

## 2024-04-06 LAB
AMMONIA 24H UR-SRATE: 31 MMOL/24 HR (ref 15–60)
CALCIUM 24H UR-MRATE: 511 MG/24 HR
CALCIUM PHOSPHATE SATURATION: 2.54 (ref 0.5–2)
CALCIUM/CREAT.RATIO: 283 MG/G CREAT (ref 34–196)
CALCIUM/KG BODY WEIGHT: 4.9 MG/24 HR/KG
CAOX INDEX 24H UR-RTO: 9.92 RATIO (ref 6–10)
CHLORIDE 24H UR-SRATE: 131 MMOL/24 HR (ref 70–250)
CITRATE 24H UR-MRATE: 1984 MG/24 HR
COLLECT DURATION TIME UR: ABNORMAL HR
COMMENT: ABNORMAL
CREAT 24H UR-MRATE: 1804 MG/24 HR
CREATININE/KG BODY WEIGHT: 17.3 MG/24 HR/KG (ref 11.9–24.4)
CYSTINE, URINE: ABNORMAL
MAGNESIUM 24H UR-MRATE: 166 MG/24 HR (ref 30–120)
OXALATE 24H UR-MRATE: 37 MG/24 HR (ref 20–40)
PH 24H UR: 5.93 (ref 5.8–6.2)
PHOSPHATE 24H UR-MRATE: 1461 MG/24 HR (ref 600–1200)
POTASSIUM 24H UR-SRATE: 51 MMOL/24 HR (ref 20–100)
PROTEIN CATABOLIC RATE, U: 0.9 G/KG/24 HR (ref 0.8–1.4)
SODIUM 24H UR-SRATE: 164 MMOL/24 HR (ref 50–150)
SPECIMEN VOL 24H UR: 1980 ML/24 HR (ref 500–4000)
SPECIMEN VOL ?TM UR: 1700 ML
SULFATE 24H UR-SRATE: 39 MEQ/24 HR (ref 20–80)
URATE 24H UR-MRATE: 926 MG/24 HR
UREA NITROGEN, UR: 11.63 G/24 HR (ref 6–14)
URIC ACID SATURATION: 1.07

## 2024-05-13 ENCOUNTER — OFFICE VISIT (OUTPATIENT)
Dept: UROLOGY | Age: 59
End: 2024-05-13
Payer: COMMERCIAL

## 2024-05-13 DIAGNOSIS — R82.994 HYPERCALCIURIA: ICD-10-CM

## 2024-05-13 DIAGNOSIS — N20.0 NEPHROLITHIASIS: ICD-10-CM

## 2024-05-13 DIAGNOSIS — N20.0 KIDNEY STONE: Primary | ICD-10-CM

## 2024-05-13 PROCEDURE — 81003 URINALYSIS AUTO W/O SCOPE: CPT | Performed by: UROLOGY

## 2024-05-13 PROCEDURE — 99214 OFFICE O/P EST MOD 30 MIN: CPT | Performed by: UROLOGY

## 2024-05-13 RX ORDER — HYDROCHLOROTHIAZIDE 25 MG/1
25 TABLET ORAL EVERY MORNING
Qty: 90 TABLET | Refills: 3 | Status: SHIPPED | OUTPATIENT
Start: 2024-05-13

## 2024-05-13 ASSESSMENT — ENCOUNTER SYMPTOMS
BACK PAIN: 0
NAUSEA: 0

## 2024-05-13 NOTE — PROGRESS NOTES
[facility]  [address] [place] [id number]  [phone]    Zane Fitch  : 1965    Chief Complaint   Patient presents with    Follow-up        HPI     Zane Fitch is a 58 y.o. male with hx of kidney stones.   S/p left ESWL zo[address] 23 for 19 mm stone left superior kidney.  Patient had large stone in the mid to upper pole of the left kidney measuring 19 x 15 mm.  It appeared that a total of 3000 shockwaves were given there was minimal change in the stone after lithotripsy.    S/p left PNL 23:    FINDINGS:  Large stone within a mid bob in the left kidney, triangular in shape, measuring 22 mm in maximum diameter.  Approximately, three stones in the inferior collecting system each of which measures 3 mm.  Stone analysis:   Ca Oxalate,Dihydrate % 30    CALCIUM OXALATE MONOHYDRATE % 70    He did not want to do metabolic workup.  He has been passing stones occasionally. KUB 24, shows am 11 mm and 15 mm ca2++s in area of right mid kidney these look new. Also small stones in left kidney.      Stones appear to be enlarging, especially on the right. May need metabolic workup.   ??? CT, shows 14 mm stone in left renal pelvis and 10 mm stone in right mid calyx.   No hydronephrosis.   He has no pain.   He is passing frequent stones. He does not want to treat the right renal stone.     CT stone protocol in 3-24:   IMPRESSION:  Bilateral nonobstructing renal calculi in addition to a larger 14 mm  diameter calculus in right renal pelvis.  Minimally perceptible hydronephrosis in right kidney. No hydroureter. No  hydronephrosis left kidney.  Review of CT and KUB shows 16 mm stone in right renal pelvis, 12 mm stone in right mid calyx.   Metabolic workup reviewed, serum studies normal, urine studies shows elevated urine calcium of 511, elevated urine uric acid of 926   Past Medical History:   Diagnosis Date    Diabetes (HCC)     type 2- oral agents- does not check

## 2024-06-17 ENCOUNTER — PROCEDURE VISIT (OUTPATIENT)
Dept: NEUROLOGY | Age: 59
End: 2024-06-17
Payer: COMMERCIAL

## 2024-06-17 ENCOUNTER — NURSE ONLY (OUTPATIENT)
Dept: FAMILY MEDICINE CLINIC | Facility: CLINIC | Age: 59
End: 2024-06-17
Payer: COMMERCIAL

## 2024-06-17 VITALS — OXYGEN SATURATION: 98 % | BODY MASS INDEX: 30.99 KG/M2 | WEIGHT: 216 LBS | HEART RATE: 68 BPM

## 2024-06-17 DIAGNOSIS — Z12.5 ENCOUNTER FOR PROSTATE CANCER SCREENING: ICD-10-CM

## 2024-06-17 DIAGNOSIS — G56.02 LEFT CARPAL TUNNEL SYNDROME: ICD-10-CM

## 2024-06-17 DIAGNOSIS — R20.2 NUMBNESS AND TINGLING OF BOTH FEET: Primary | ICD-10-CM

## 2024-06-17 DIAGNOSIS — I10 ESSENTIAL HYPERTENSION, BENIGN: ICD-10-CM

## 2024-06-17 DIAGNOSIS — G60.0 CMT (CHARCOT-MARIE-TOOTH DISEASE): ICD-10-CM

## 2024-06-17 DIAGNOSIS — E55.9 VITAMIN D DEFICIENCY: Primary | ICD-10-CM

## 2024-06-17 DIAGNOSIS — E11.40 CONTROLLED TYPE 2 DIABETES MELLITUS WITH NEUROPATHY (HCC): ICD-10-CM

## 2024-06-17 DIAGNOSIS — R20.0 NUMBNESS AND TINGLING IN BOTH HANDS: ICD-10-CM

## 2024-06-17 DIAGNOSIS — R20.0 NUMBNESS AND TINGLING OF BOTH FEET: Primary | ICD-10-CM

## 2024-06-17 DIAGNOSIS — R20.2 NUMBNESS AND TINGLING IN BOTH HANDS: ICD-10-CM

## 2024-06-17 LAB
25(OH)D3 SERPL-MCNC: 32 NG/ML (ref 30–100)
ALBUMIN SERPL-MCNC: 4.2 G/DL (ref 3.5–5)
ALBUMIN/GLOB SERPL: 1.5 (ref 1–1.9)
ALP SERPL-CCNC: 68 U/L (ref 40–129)
ALT SERPL-CCNC: 40 U/L (ref 12–65)
ANION GAP SERPL CALC-SCNC: 11 MMOL/L (ref 9–18)
AST SERPL-CCNC: 44 U/L (ref 15–37)
BASOPHILS # BLD: 0.1 K/UL (ref 0–0.2)
BASOPHILS NFR BLD: 1 % (ref 0–2)
BILIRUB SERPL-MCNC: 0.5 MG/DL (ref 0–1.2)
BILIRUBIN, URINE, POC: NEGATIVE
BLOOD URINE, POC: ABNORMAL
BUN SERPL-MCNC: 17 MG/DL (ref 6–23)
CALCIUM SERPL-MCNC: 9.4 MG/DL (ref 8.8–10.2)
CHLORIDE SERPL-SCNC: 101 MMOL/L (ref 98–107)
CHOLEST SERPL-MCNC: 118 MG/DL (ref 0–200)
CO2 SERPL-SCNC: 27 MMOL/L (ref 20–28)
CREAT SERPL-MCNC: 1.06 MG/DL (ref 0.8–1.3)
CREAT UR-MCNC: 119 MG/DL (ref 39–259)
DIFFERENTIAL METHOD BLD: NORMAL
EOSINOPHIL # BLD: 0.3 K/UL (ref 0–0.8)
EOSINOPHIL NFR BLD: 3 % (ref 0.5–7.8)
ERYTHROCYTE [DISTWIDTH] IN BLOOD BY AUTOMATED COUNT: 13.5 % (ref 11.9–14.6)
EST. AVERAGE GLUCOSE BLD GHB EST-MCNC: 152 MG/DL
GLOBULIN SER CALC-MCNC: 2.8 G/DL (ref 2.3–3.5)
GLUCOSE SERPL-MCNC: 128 MG/DL (ref 70–99)
GLUCOSE URINE, POC: ABNORMAL
HBA1C MFR BLD: 6.9 % (ref 0–5.6)
HCT VFR BLD AUTO: 49 % (ref 41.1–50.3)
HDLC SERPL-MCNC: 26 MG/DL (ref 40–60)
HDLC SERPL: 4.6 (ref 0–5)
HGB BLD-MCNC: 16.1 G/DL (ref 13.6–17.2)
IMM GRANULOCYTES # BLD AUTO: 0 K/UL (ref 0–0.5)
IMM GRANULOCYTES NFR BLD AUTO: 0 % (ref 0–5)
KETONES, URINE, POC: NEGATIVE
LDLC SERPL CALC-MCNC: 49 MG/DL (ref 0–100)
LEUKOCYTE ESTERASE, URINE, POC: NEGATIVE
LYMPHOCYTES # BLD: 2.8 K/UL (ref 0.5–4.6)
LYMPHOCYTES NFR BLD: 28 % (ref 13–44)
MCH RBC QN AUTO: 30.1 PG (ref 26.1–32.9)
MCHC RBC AUTO-ENTMCNC: 32.9 G/DL (ref 31.4–35)
MCV RBC AUTO: 91.6 FL (ref 82–102)
MICROALBUMIN UR-MCNC: 4.68 MG/DL (ref 0–20)
MICROALBUMIN/CREAT UR-RTO: 39 MG/G (ref 0–30)
MONOCYTES # BLD: 0.7 K/UL (ref 0.1–1.3)
MONOCYTES NFR BLD: 7 % (ref 4–12)
NEUTS SEG # BLD: 6 K/UL (ref 1.7–8.2)
NEUTS SEG NFR BLD: 61 % (ref 43–78)
NITRITE, URINE, POC: NEGATIVE
NRBC # BLD: 0 K/UL (ref 0–0.2)
PH, URINE, POC: 5.5 (ref 4.6–8)
PLATELET # BLD AUTO: 306 K/UL (ref 150–450)
PMV BLD AUTO: 10.1 FL (ref 9.4–12.3)
POTASSIUM SERPL-SCNC: 4.1 MMOL/L (ref 3.5–5.1)
PROT SERPL-MCNC: 7.1 G/DL (ref 6.3–8.2)
PROTEIN,URINE, POC: NEGATIVE
PSA SERPL-MCNC: 1.2 NG/ML (ref 0–4)
RBC # BLD AUTO: 5.35 M/UL (ref 4.23–5.6)
SODIUM SERPL-SCNC: 139 MMOL/L (ref 136–145)
SPECIFIC GRAVITY, URINE, POC: 1.02 (ref 1–1.03)
TRIGL SERPL-MCNC: 217 MG/DL (ref 0–150)
TSH W FREE THYROID IF ABNORMAL: 3.43 UIU/ML (ref 0.27–4.2)
URINALYSIS CLARITY, POC: ABNORMAL
URINALYSIS COLOR, POC: YELLOW
UROBILINOGEN, POC: NORMAL
VLDLC SERPL CALC-MCNC: 43 MG/DL (ref 6–23)
WBC # BLD AUTO: 9.9 K/UL (ref 4.3–11.1)

## 2024-06-17 PROCEDURE — 95913 NRV CNDJ TEST 13/> STUDIES: CPT | Performed by: PSYCHIATRY & NEUROLOGY

## 2024-06-17 PROCEDURE — 95885 MUSC TST DONE W/NERV TST LIM: CPT | Performed by: PSYCHIATRY & NEUROLOGY

## 2024-06-17 PROCEDURE — 81003 URINALYSIS AUTO W/O SCOPE: CPT | Performed by: NURSE PRACTITIONER

## 2024-06-17 NOTE — PROGRESS NOTES
AXONAL DENERVATION.           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      Procedure Details:     Correlates with a generalized length-dependent polyneuropathy with associated carpal tunnel syndrome superimposed on the left of moderate severity.    Patient made fully aware especially in regards to his increased likelihood of falling etc. and he will review with PCP referring in regards to further pursuit of                Please Note::     Data and waveforms * filed under Procedure category [facility].    See Procedure Files for complete data pages.       [ *NOTE:  parts or all of this consultation are produced using artificial voice recognition software.  Some speech errors are inherent in such software and may be included in the produced record. ]           Niranjan Jesus MD  [facility]  [place] [address]  Phone:  [phone]  Fax:   [phone]          + + +   Glossary:   MUP: motor unit potential;  SNAP: sensory nerve action potential; Fibs:  Fibrillations;  Fascic: fasciculations; IA: insertional activity;  IP: interference pattern;  SCV :  Sensory conduction velocity;  MCV: motor conduction velocity; NOTE:: muscles are abbreviated latin initials.     Nicolet raw datafile::   * filed at Procedure or Media Files. *

## 2024-06-24 ENCOUNTER — OFFICE VISIT (OUTPATIENT)
Dept: FAMILY MEDICINE CLINIC | Facility: CLINIC | Age: 59
End: 2024-06-24
Payer: COMMERCIAL

## 2024-06-24 VITALS
SYSTOLIC BLOOD PRESSURE: 120 MMHG | HEIGHT: 70 IN | DIASTOLIC BLOOD PRESSURE: 78 MMHG | OXYGEN SATURATION: 97 % | HEART RATE: 67 BPM | WEIGHT: 216 LBS | BODY MASS INDEX: 30.92 KG/M2

## 2024-06-24 DIAGNOSIS — E55.9 VITAMIN D DEFICIENCY: ICD-10-CM

## 2024-06-24 DIAGNOSIS — I10 ESSENTIAL HYPERTENSION, BENIGN: ICD-10-CM

## 2024-06-24 DIAGNOSIS — L23.7 ALLERGIC CONTACT DERMATITIS DUE TO PLANTS, EXCEPT FOOD: ICD-10-CM

## 2024-06-24 DIAGNOSIS — E78.5 HYPERLIPIDEMIA LDL GOAL <70: ICD-10-CM

## 2024-06-24 DIAGNOSIS — E11.40 CONTROLLED TYPE 2 DIABETES MELLITUS WITH NEUROPATHY (HCC): Primary | ICD-10-CM

## 2024-06-24 DIAGNOSIS — E53.8 B12 DEFICIENCY: ICD-10-CM

## 2024-06-24 DIAGNOSIS — M10.9 GOUT, UNSPECIFIED CAUSE, UNSPECIFIED CHRONICITY, UNSPECIFIED SITE: ICD-10-CM

## 2024-06-24 PROBLEM — K57.30 DIVERTICULOSIS OF LARGE INTESTINE WITHOUT PERFORATION OR ABSCESS WITHOUT BLEEDING: Status: ACTIVE | Noted: 2023-05-02

## 2024-06-24 PROBLEM — R74.01 ELEVATION OF LEVEL OF TRANSAMINASE AND LACTIC ACID DEHYDROGENASE (LDH): Status: ACTIVE | Noted: 2024-06-24

## 2024-06-24 PROBLEM — N28.9 KIDNEY PROBLEM: Status: ACTIVE | Noted: 2024-06-24

## 2024-06-24 PROBLEM — C80.1 MALIGNANT NEOPLASM (HCC): Status: ACTIVE | Noted: 2024-06-24

## 2024-06-24 PROBLEM — C81.90 HODGKINS LYMPHOMA (HCC): Status: ACTIVE | Noted: 2024-06-24

## 2024-06-24 PROBLEM — K59.00 CONSTIPATION IN MALE: Status: ACTIVE | Noted: 2022-12-04

## 2024-06-24 PROBLEM — E11.9 DIABETES (HCC): Status: ACTIVE | Noted: 2024-06-24

## 2024-06-24 PROBLEM — E78.00 PURE HYPERCHOLESTEROLEMIA: Status: ACTIVE | Noted: 2024-06-24

## 2024-06-24 PROBLEM — R74.02 ELEVATION OF LEVEL OF TRANSAMINASE AND LACTIC ACID DEHYDROGENASE (LDH): Status: ACTIVE | Noted: 2024-06-24

## 2024-06-24 PROCEDURE — 3074F SYST BP LT 130 MM HG: CPT | Performed by: NURSE PRACTITIONER

## 2024-06-24 PROCEDURE — 3044F HG A1C LEVEL LT 7.0%: CPT | Performed by: NURSE PRACTITIONER

## 2024-06-24 PROCEDURE — 99214 OFFICE O/P EST MOD 30 MIN: CPT | Performed by: NURSE PRACTITIONER

## 2024-06-24 PROCEDURE — 3078F DIAST BP <80 MM HG: CPT | Performed by: NURSE PRACTITIONER

## 2024-06-24 RX ORDER — GABAPENTIN 100 MG/1
100 CAPSULE ORAL EVERY MORNING
Qty: 90 CAPSULE | Refills: 1 | Status: SHIPPED | OUTPATIENT
Start: 2024-06-24 | End: 2024-12-21

## 2024-06-24 RX ORDER — CYANOCOBALAMIN (VITAMIN B-12) 100 MCG
1 TABLET ORAL DAILY
Qty: 90 TABLET | Refills: 3 | Status: SHIPPED | OUTPATIENT
Start: 2024-06-24

## 2024-06-24 RX ORDER — CLOBETASOL PROPIONATE 0.5 MG/G
CREAM TOPICAL
Qty: 60 G | Refills: 0 | Status: SHIPPED | OUTPATIENT
Start: 2024-06-24

## 2024-06-24 RX ORDER — EZETIMIBE 10 MG/1
10 TABLET ORAL DAILY
Qty: 90 TABLET | Refills: 3 | Status: SHIPPED | OUTPATIENT
Start: 2024-06-24

## 2024-06-24 RX ORDER — FENOFIBRATE 160 MG/1
160 TABLET ORAL NIGHTLY
Qty: 90 TABLET | Refills: 3 | Status: SHIPPED | OUTPATIENT
Start: 2024-06-24

## 2024-06-24 ASSESSMENT — ENCOUNTER SYMPTOMS
CHEST TIGHTNESS: 0
NAUSEA: 0
EYE REDNESS: 0
SINUS PAIN: 0
SORE THROAT: 0
SINUS PRESSURE: 0
ALLERGIC/IMMUNOLOGIC NEGATIVE: 1
APNEA: 0
PHOTOPHOBIA: 0
FACIAL SWELLING: 0
CONSTIPATION: 0
ANAL BLEEDING: 0
EYE DISCHARGE: 0
EYES NEGATIVE: 1
EYE PAIN: 0
RECTAL PAIN: 0
COLOR CHANGE: 0
WHEEZING: 0
ABDOMINAL PAIN: 0
VOMITING: 0
GASTROINTESTINAL NEGATIVE: 1
RHINORRHEA: 0
COUGH: 0
DIARRHEA: 0
SHORTNESS OF BREATH: 0
ABDOMINAL DISTENTION: 0
TROUBLE SWALLOWING: 0
EYE ITCHING: 0
CHOKING: 0
STRIDOR: 0
BLOOD IN STOOL: 0
VOICE CHANGE: 0
BACK PAIN: 0
RESPIRATORY NEGATIVE: 1

## 2024-06-24 NOTE — PROGRESS NOTES
PROGRESS NOTE    Chief Complaint   Patient presents with    Follow-up     On existing condtions    Poison Ivy     On left leg and arms       SUBJECTIVE:     Zane Fitch is a very pleasant 59 y.o. male with hx of Hod[place]/lymphoma - currently following oncology, elevated liver function-currently following GI, diabetes, hypertension and hyperlipidemia, seen today in office for lab results review.  Patient continues to have numbness and tingling to his extremities upper and lower.  Recently had a nerve conduction test completed that show severe peripheral neuropathy/polyneuropathy of the sensorimotor type distal and likely hereditary.  Currently taking amitriptyline at bedtime in addition to gabapentin at bedtime.  Reports no significant improvement.  Has some imbalance with walking and finds himself \"walking sideways\" at work sometimes.  He does endorse drinking a lot of sodas mainly at work for thirst response.  Does not drink much water.  Also currently does a lot of landscaping and has been exposed to poison ivy and use the cream Lotrisone but reports that cream does not help.      Past Medical History, Past Surgical History, Family history, Social History, and Medications were all reviewed with the patient today and updated as necessary.       Current Outpatient Medications   Medication Sig Dispense Refill    gabapentin (NEURONTIN) 100 MG capsule Take 1 capsule by mouth every morning for 180 days. For nerve pain. May cause drowsiness 90 capsule 1    fenofibrate (TRIGLIDE) 160 MG tablet Take 1 tablet by mouth nightly For cholesterol 90 tablet 3    Cyanocobalamin (B-12-SL) 1000 MCG SUBL Place 1 tablet under the tongue daily For B12 supplementation 90 tablet 3    clobetasol (TEMOVATE) 0.05 % cream Apply topically 2 times daily for poison ivy rash 60 g 0    Handicap Placard MISC by Does not apply route 1 each 0    ezetimibe (ZETIA) 10 MG tablet Take 1 tablet by mouth daily For cholesterol lowering 90

## 2024-07-22 ENCOUNTER — TELEPHONE (OUTPATIENT)
Dept: FAMILY MEDICINE CLINIC | Facility: CLINIC | Age: 59
End: 2024-07-22

## 2024-09-16 ENCOUNTER — LAB (OUTPATIENT)
Dept: FAMILY MEDICINE CLINIC | Facility: CLINIC | Age: 59
End: 2024-09-16

## 2024-09-16 DIAGNOSIS — E55.9 VITAMIN D DEFICIENCY: ICD-10-CM

## 2024-09-16 DIAGNOSIS — E53.8 B12 DEFICIENCY: ICD-10-CM

## 2024-09-16 DIAGNOSIS — M10.9 GOUT, UNSPECIFIED CAUSE, UNSPECIFIED CHRONICITY, UNSPECIFIED SITE: ICD-10-CM

## 2024-09-16 DIAGNOSIS — E11.40 CONTROLLED TYPE 2 DIABETES MELLITUS WITH NEUROPATHY (HCC): ICD-10-CM

## 2024-09-16 LAB
25(OH)D3 SERPL-MCNC: 36.8 NG/ML (ref 30–100)
ALBUMIN SERPL-MCNC: 4 G/DL (ref 3.5–5)
ALBUMIN/GLOB SERPL: 1.2 (ref 1–1.9)
ALP SERPL-CCNC: 58 U/L (ref 40–129)
ALT SERPL-CCNC: 39 U/L (ref 12–65)
ANION GAP SERPL CALC-SCNC: 10 MMOL/L (ref 9–18)
AST SERPL-CCNC: 40 U/L (ref 15–37)
BASOPHILS # BLD: 0.1 K/UL (ref 0–0.2)
BASOPHILS NFR BLD: 1 % (ref 0–2)
BILIRUB SERPL-MCNC: 0.4 MG/DL (ref 0–1.2)
BUN SERPL-MCNC: 18 MG/DL (ref 6–23)
CALCIUM SERPL-MCNC: 9.3 MG/DL (ref 8.8–10.2)
CHLORIDE SERPL-SCNC: 108 MMOL/L (ref 98–107)
CHOLEST SERPL-MCNC: 130 MG/DL (ref 0–200)
CO2 SERPL-SCNC: 26 MMOL/L (ref 20–28)
CREAT SERPL-MCNC: 0.94 MG/DL (ref 0.8–1.3)
DIFFERENTIAL METHOD BLD: NORMAL
EOSINOPHIL # BLD: 0.2 K/UL (ref 0–0.8)
EOSINOPHIL NFR BLD: 3 % (ref 0.5–7.8)
ERYTHROCYTE [DISTWIDTH] IN BLOOD BY AUTOMATED COUNT: 13.5 % (ref 11.9–14.6)
EST. AVERAGE GLUCOSE BLD GHB EST-MCNC: 139 MG/DL
GLOBULIN SER CALC-MCNC: 3.2 G/DL (ref 2.3–3.5)
GLUCOSE SERPL-MCNC: 97 MG/DL (ref 70–99)
HBA1C MFR BLD: 6.5 % (ref 0–5.6)
HCT VFR BLD AUTO: 49.1 % (ref 41.1–50.3)
HDLC SERPL-MCNC: 29 MG/DL (ref 40–60)
HDLC SERPL: 4.5 (ref 0–5)
HGB BLD-MCNC: 16.1 G/DL (ref 13.6–17.2)
IMM GRANULOCYTES # BLD AUTO: 0 K/UL (ref 0–0.5)
IMM GRANULOCYTES NFR BLD AUTO: 0 % (ref 0–5)
LDLC SERPL CALC-MCNC: 75 MG/DL (ref 0–100)
LYMPHOCYTES # BLD: 1.5 K/UL (ref 0.5–4.6)
LYMPHOCYTES NFR BLD: 22 % (ref 13–44)
MCH RBC QN AUTO: 30.1 PG (ref 26.1–32.9)
MCHC RBC AUTO-ENTMCNC: 32.8 G/DL (ref 31.4–35)
MCV RBC AUTO: 91.8 FL (ref 82–102)
MONOCYTES # BLD: 0.6 K/UL (ref 0.1–1.3)
MONOCYTES NFR BLD: 9 % (ref 4–12)
NEUTS SEG # BLD: 4.3 K/UL (ref 1.7–8.2)
NEUTS SEG NFR BLD: 65 % (ref 43–78)
NRBC # BLD: 0 K/UL (ref 0–0.2)
PLATELET # BLD AUTO: 300 K/UL (ref 150–450)
PMV BLD AUTO: 9.9 FL (ref 9.4–12.3)
POTASSIUM SERPL-SCNC: 4.4 MMOL/L (ref 3.5–5.1)
PROT SERPL-MCNC: 7.2 G/DL (ref 6.3–8.2)
RBC # BLD AUTO: 5.35 M/UL (ref 4.23–5.6)
SODIUM SERPL-SCNC: 144 MMOL/L (ref 136–145)
TRIGL SERPL-MCNC: 129 MG/DL (ref 0–150)
TSH W FREE THYROID IF ABNORMAL: 2.83 UIU/ML (ref 0.27–4.2)
URATE SERPL-MCNC: 4 MG/DL (ref 3.9–8.2)
VIT B12 SERPL-MCNC: 485 PG/ML (ref 193–986)
VLDLC SERPL CALC-MCNC: 26 MG/DL (ref 6–23)
WBC # BLD AUTO: 6.6 K/UL (ref 4.3–11.1)

## 2024-09-23 ENCOUNTER — OFFICE VISIT (OUTPATIENT)
Dept: FAMILY MEDICINE CLINIC | Facility: CLINIC | Age: 59
End: 2024-09-23
Payer: COMMERCIAL

## 2024-09-23 VITALS
SYSTOLIC BLOOD PRESSURE: 110 MMHG | WEIGHT: 199 LBS | OXYGEN SATURATION: 99 % | HEART RATE: 65 BPM | HEIGHT: 70 IN | DIASTOLIC BLOOD PRESSURE: 74 MMHG | BODY MASS INDEX: 28.49 KG/M2

## 2024-09-23 DIAGNOSIS — Z12.5 SPECIAL SCREENING FOR MALIGNANT NEOPLASM OF PROSTATE: ICD-10-CM

## 2024-09-23 DIAGNOSIS — E53.8 B12 DEFICIENCY: ICD-10-CM

## 2024-09-23 DIAGNOSIS — E11.40 CONTROLLED TYPE 2 DIABETES MELLITUS WITH NEUROPATHY (HCC): ICD-10-CM

## 2024-09-23 DIAGNOSIS — M10.9 GOUT, UNSPECIFIED CAUSE, UNSPECIFIED CHRONICITY, UNSPECIFIED SITE: ICD-10-CM

## 2024-09-23 DIAGNOSIS — B96.89 ACUTE BACTERIAL SINUSITIS: Primary | ICD-10-CM

## 2024-09-23 DIAGNOSIS — J40 BRONCHITIS: ICD-10-CM

## 2024-09-23 DIAGNOSIS — E78.5 HYPERLIPIDEMIA LDL GOAL <70: ICD-10-CM

## 2024-09-23 DIAGNOSIS — E55.9 VITAMIN D DEFICIENCY: ICD-10-CM

## 2024-09-23 DIAGNOSIS — I10 ESSENTIAL HYPERTENSION, BENIGN: ICD-10-CM

## 2024-09-23 DIAGNOSIS — J01.90 ACUTE BACTERIAL SINUSITIS: Primary | ICD-10-CM

## 2024-09-23 PROCEDURE — 3074F SYST BP LT 130 MM HG: CPT | Performed by: NURSE PRACTITIONER

## 2024-09-23 PROCEDURE — 96372 THER/PROPH/DIAG INJ SC/IM: CPT | Performed by: NURSE PRACTITIONER

## 2024-09-23 PROCEDURE — 3044F HG A1C LEVEL LT 7.0%: CPT | Performed by: NURSE PRACTITIONER

## 2024-09-23 PROCEDURE — 99214 OFFICE O/P EST MOD 30 MIN: CPT | Performed by: NURSE PRACTITIONER

## 2024-09-23 PROCEDURE — 3078F DIAST BP <80 MM HG: CPT | Performed by: NURSE PRACTITIONER

## 2024-09-23 RX ORDER — GABAPENTIN 100 MG/1
100 CAPSULE ORAL EVERY MORNING
Qty: 90 CAPSULE | Refills: 3 | Status: SHIPPED | OUTPATIENT
Start: 2024-09-23 | End: 2025-09-23

## 2024-09-23 RX ORDER — CYANOCOBALAMIN 1000 UG/ML
1000 INJECTION, SOLUTION INTRAMUSCULAR; SUBCUTANEOUS ONCE
Status: COMPLETED | OUTPATIENT
Start: 2024-09-23 | End: 2024-09-23

## 2024-09-23 RX ORDER — GABAPENTIN 300 MG/1
300 CAPSULE ORAL NIGHTLY
Qty: 90 CAPSULE | Refills: 3 | Status: SHIPPED | OUTPATIENT
Start: 2024-09-23 | End: 2025-09-23

## 2024-09-23 RX ORDER — DOXYCYCLINE 100 MG/1
100 TABLET ORAL 2 TIMES DAILY
Qty: 20 TABLET | Refills: 0 | Status: SHIPPED | OUTPATIENT
Start: 2024-09-23 | End: 2024-10-03

## 2024-09-23 RX ADMIN — CYANOCOBALAMIN 1000 MCG: 1000 INJECTION, SOLUTION INTRAMUSCULAR; SUBCUTANEOUS at 08:24

## 2024-09-23 SDOH — ECONOMIC STABILITY: FOOD INSECURITY: WITHIN THE PAST 12 MONTHS, YOU WORRIED THAT YOUR FOOD WOULD RUN OUT BEFORE YOU GOT MONEY TO BUY MORE.: NEVER TRUE

## 2024-09-23 SDOH — ECONOMIC STABILITY: FOOD INSECURITY: WITHIN THE PAST 12 MONTHS, THE FOOD YOU BOUGHT JUST DIDN'T LAST AND YOU DIDN'T HAVE MONEY TO GET MORE.: NEVER TRUE

## 2024-09-23 SDOH — ECONOMIC STABILITY: INCOME INSECURITY: HOW HARD IS IT FOR YOU TO PAY FOR THE VERY BASICS LIKE FOOD, HOUSING, MEDICAL CARE, AND HEATING?: NOT HARD AT ALL

## 2024-09-23 ASSESSMENT — ENCOUNTER SYMPTOMS
STRIDOR: 0
SORE THROAT: 0
ANAL BLEEDING: 0
VOICE CHANGE: 0
EYE DISCHARGE: 0
CHEST TIGHTNESS: 0
CONSTIPATION: 0
NAUSEA: 0
VOMITING: 0
APNEA: 0
TROUBLE SWALLOWING: 0
DIARRHEA: 0
COUGH: 0
BACK PAIN: 0
ALLERGIC/IMMUNOLOGIC NEGATIVE: 1
EYE REDNESS: 0
RECTAL PAIN: 0
CHOKING: 0
SINUS PRESSURE: 0
RHINORRHEA: 0
EYE ITCHING: 0
SINUS PAIN: 0
EYES NEGATIVE: 1
WHEEZING: 0
RESPIRATORY NEGATIVE: 1
COLOR CHANGE: 0
SHORTNESS OF BREATH: 0
ABDOMINAL DISTENTION: 0
FACIAL SWELLING: 0
PHOTOPHOBIA: 0
ABDOMINAL PAIN: 0
EYE PAIN: 0
BLOOD IN STOOL: 0
GASTROINTESTINAL NEGATIVE: 1

## 2025-01-16 DIAGNOSIS — E11.40 CONTROLLED TYPE 2 DIABETES MELLITUS WITH NEUROPATHY (HCC): ICD-10-CM

## 2025-01-16 DIAGNOSIS — E78.00 PURE HYPERCHOLESTEROLEMIA: ICD-10-CM

## 2025-01-16 DIAGNOSIS — M19.90 ARTHRITIS: ICD-10-CM

## 2025-01-16 DIAGNOSIS — I10 ESSENTIAL HYPERTENSION, BENIGN: ICD-10-CM

## 2025-01-16 RX ORDER — ATORVASTATIN CALCIUM 80 MG/1
TABLET, FILM COATED ORAL
Qty: 90 TABLET | Refills: 3 | Status: SHIPPED | OUTPATIENT
Start: 2025-01-16

## 2025-01-16 RX ORDER — ATENOLOL 25 MG/1
TABLET ORAL
Qty: 90 TABLET | Refills: 3 | Status: SHIPPED | OUTPATIENT
Start: 2025-01-16

## 2025-01-16 RX ORDER — AMITRIPTYLINE HYDROCHLORIDE 50 MG/1
TABLET ORAL
Qty: 90 TABLET | Refills: 3 | Status: SHIPPED | OUTPATIENT
Start: 2025-01-16

## 2025-01-16 RX ORDER — DAPAGLIFLOZIN AND METFORMIN HYDROCHLORIDE 5; 1000 MG/1; MG/1
TABLET, FILM COATED, EXTENDED RELEASE ORAL
Qty: 90 TABLET | Refills: 3 | Status: SHIPPED | OUTPATIENT
Start: 2025-01-16

## 2025-01-16 RX ORDER — CELECOXIB 200 MG/1
CAPSULE ORAL
Qty: 180 CAPSULE | Refills: 3 | Status: SHIPPED | OUTPATIENT
Start: 2025-01-16

## 2025-03-03 ENCOUNTER — TELEPHONE (OUTPATIENT)
Dept: FAMILY MEDICINE CLINIC | Facility: CLINIC | Age: 60
End: 2025-03-03

## 2025-03-03 NOTE — TELEPHONE ENCOUNTER
Patient states [facility] is going to fax us his diabetic eye exam and we need to send it send it to whoever sends him his diabetic meds. Please call patient for further info after we get the fax

## 2025-03-24 ENCOUNTER — LAB (OUTPATIENT)
Dept: FAMILY MEDICINE CLINIC | Facility: CLINIC | Age: 60
End: 2025-03-24
Payer: COMMERCIAL

## 2025-03-24 DIAGNOSIS — Z12.5 SPECIAL SCREENING FOR MALIGNANT NEOPLASM OF PROSTATE: ICD-10-CM

## 2025-03-24 DIAGNOSIS — E11.40 CONTROLLED TYPE 2 DIABETES MELLITUS WITH NEUROPATHY (HCC): ICD-10-CM

## 2025-03-24 DIAGNOSIS — E78.5 HYPERLIPIDEMIA LDL GOAL <70: ICD-10-CM

## 2025-03-24 DIAGNOSIS — M10.9 GOUT, UNSPECIFIED CAUSE, UNSPECIFIED CHRONICITY, UNSPECIFIED SITE: ICD-10-CM

## 2025-03-24 DIAGNOSIS — E53.8 B12 DEFICIENCY: ICD-10-CM

## 2025-03-24 DIAGNOSIS — E55.9 VITAMIN D DEFICIENCY: ICD-10-CM

## 2025-03-24 DIAGNOSIS — I10 ESSENTIAL HYPERTENSION, BENIGN: ICD-10-CM

## 2025-03-24 LAB
25(OH)D3 SERPL-MCNC: 33 NG/ML (ref 30–100)
ALBUMIN SERPL-MCNC: 4.2 G/DL (ref 3.5–5)
ALBUMIN/GLOB SERPL: 1.4 (ref 1–1.9)
ALP SERPL-CCNC: 49 U/L (ref 40–129)
ALT SERPL-CCNC: 33 U/L (ref 8–55)
ANION GAP SERPL CALC-SCNC: 10 MMOL/L (ref 7–16)
AST SERPL-CCNC: 44 U/L (ref 15–37)
BASOPHILS # BLD: 0.07 K/UL (ref 0–0.2)
BASOPHILS NFR BLD: 1 % (ref 0–2)
BILIRUB SERPL-MCNC: 0.4 MG/DL (ref 0–1.2)
BUN SERPL-MCNC: 28 MG/DL (ref 6–23)
CALCIUM SERPL-MCNC: 9.8 MG/DL (ref 8.8–10.2)
CHLORIDE SERPL-SCNC: 106 MMOL/L (ref 98–107)
CHOLEST SERPL-MCNC: 105 MG/DL (ref 0–200)
CO2 SERPL-SCNC: 26 MMOL/L (ref 20–29)
CREAT SERPL-MCNC: 1.9 MG/DL (ref 0.8–1.3)
CREAT UR-MCNC: 107 MG/DL (ref 39–259)
DIFFERENTIAL METHOD BLD: NORMAL
EOSINOPHIL # BLD: 0.25 K/UL (ref 0–0.8)
EOSINOPHIL NFR BLD: 3.7 % (ref 0.5–7.8)
ERYTHROCYTE [DISTWIDTH] IN BLOOD BY AUTOMATED COUNT: 14 % (ref 11.9–14.6)
EST. AVERAGE GLUCOSE BLD GHB EST-MCNC: 117 MG/DL
GLOBULIN SER CALC-MCNC: 3 G/DL (ref 2.3–3.5)
GLUCOSE SERPL-MCNC: 101 MG/DL (ref 70–99)
HBA1C MFR BLD: 5.7 % (ref 0–5.6)
HCT VFR BLD AUTO: 47.6 % (ref 41.1–50.3)
HDLC SERPL-MCNC: 29 MG/DL (ref 40–60)
HDLC SERPL: 3.6 (ref 0–5)
HGB BLD-MCNC: 15.3 G/DL (ref 13.6–17.2)
IMM GRANULOCYTES # BLD AUTO: 0.02 K/UL (ref 0–0.5)
IMM GRANULOCYTES NFR BLD AUTO: 0.3 % (ref 0–5)
LDLC SERPL CALC-MCNC: 57 MG/DL (ref 0–100)
LYMPHOCYTES # BLD: 2.17 K/UL (ref 0.5–4.6)
LYMPHOCYTES NFR BLD: 32.2 % (ref 13–44)
MAGNESIUM SERPL-MCNC: 2.3 MG/DL (ref 1.8–2.4)
MCH RBC QN AUTO: 29.4 PG (ref 26.1–32.9)
MCHC RBC AUTO-ENTMCNC: 32.1 G/DL (ref 31.4–35)
MCV RBC AUTO: 91.5 FL (ref 82–102)
MICROALBUMIN UR-MCNC: <1.2 MG/DL (ref 0–20)
MICROALBUMIN/CREAT UR-RTO: NORMAL MG/G (ref 0–30)
MONOCYTES # BLD: 0.7 K/UL (ref 0.1–1.3)
MONOCYTES NFR BLD: 10.4 % (ref 4–12)
NEUTS SEG # BLD: 3.52 K/UL (ref 1.7–8.2)
NEUTS SEG NFR BLD: 52.4 % (ref 43–78)
NRBC # BLD: 0 K/UL (ref 0–0.2)
PLATELET # BLD AUTO: 336 K/UL (ref 150–450)
PMV BLD AUTO: 10.4 FL (ref 9.4–12.3)
POTASSIUM SERPL-SCNC: 4.8 MMOL/L (ref 3.5–5.1)
PROT SERPL-MCNC: 7.2 G/DL (ref 6.3–8.2)
PSA SERPL-MCNC: 1.7 NG/ML (ref 0–4)
RBC # BLD AUTO: 5.2 M/UL (ref 4.23–5.6)
SODIUM SERPL-SCNC: 141 MMOL/L (ref 136–145)
T4 FREE SERPL-MCNC: 1.1 NG/DL (ref 0.9–1.7)
TRIGL SERPL-MCNC: 95 MG/DL (ref 0–150)
TSH W FREE THYROID IF ABNORMAL: 5.27 UIU/ML (ref 0.27–4.2)
URATE SERPL-MCNC: 4.4 MG/DL (ref 3.9–8.2)
VIT B12 SERPL-MCNC: 339 PG/ML (ref 193–986)
VLDLC SERPL CALC-MCNC: 19 MG/DL (ref 6–23)
WBC # BLD AUTO: 6.7 K/UL (ref 4.3–11.1)

## 2025-03-24 PROCEDURE — 36415 COLL VENOUS BLD VENIPUNCTURE: CPT | Performed by: NURSE PRACTITIONER

## 2025-03-31 ENCOUNTER — OFFICE VISIT (OUTPATIENT)
Dept: FAMILY MEDICINE CLINIC | Facility: CLINIC | Age: 60
End: 2025-03-31
Payer: COMMERCIAL

## 2025-03-31 VITALS
BODY MASS INDEX: 26.66 KG/M2 | WEIGHT: 186.2 LBS | HEART RATE: 63 BPM | SYSTOLIC BLOOD PRESSURE: 124 MMHG | DIASTOLIC BLOOD PRESSURE: 80 MMHG | OXYGEN SATURATION: 97 % | HEIGHT: 70 IN

## 2025-03-31 DIAGNOSIS — E78.00 PURE HYPERCHOLESTEROLEMIA: ICD-10-CM

## 2025-03-31 DIAGNOSIS — E11.40 CONTROLLED TYPE 2 DIABETES MELLITUS WITH NEUROPATHY (HCC): Primary | ICD-10-CM

## 2025-03-31 DIAGNOSIS — R63.4 WEIGHT LOSS: ICD-10-CM

## 2025-03-31 DIAGNOSIS — Z01.84 LACK OF IMMUNITY TO HEPATITIS B VIRUS DEMONSTRATED BY SEROLOGIC TEST: ICD-10-CM

## 2025-03-31 DIAGNOSIS — I10 ESSENTIAL HYPERTENSION, BENIGN: ICD-10-CM

## 2025-03-31 DIAGNOSIS — N17.9 AKI (ACUTE KIDNEY INJURY): ICD-10-CM

## 2025-03-31 DIAGNOSIS — C81.10 NODULAR SCLEROSING HODGKIN'S LYMPHOMA, UNSPECIFIED BODY REGION (HCC): ICD-10-CM

## 2025-03-31 DIAGNOSIS — R05.9 COUGH, UNSPECIFIED TYPE: ICD-10-CM

## 2025-03-31 PROCEDURE — 3074F SYST BP LT 130 MM HG: CPT | Performed by: NURSE PRACTITIONER

## 2025-03-31 PROCEDURE — 99214 OFFICE O/P EST MOD 30 MIN: CPT | Performed by: NURSE PRACTITIONER

## 2025-03-31 PROCEDURE — 3044F HG A1C LEVEL LT 7.0%: CPT | Performed by: NURSE PRACTITIONER

## 2025-03-31 PROCEDURE — 3079F DIAST BP 80-89 MM HG: CPT | Performed by: NURSE PRACTITIONER

## 2025-03-31 SDOH — ECONOMIC STABILITY: FOOD INSECURITY: WITHIN THE PAST 12 MONTHS, YOU WORRIED THAT YOUR FOOD WOULD RUN OUT BEFORE YOU GOT MONEY TO BUY MORE.: NEVER TRUE

## 2025-03-31 SDOH — ECONOMIC STABILITY: FOOD INSECURITY: WITHIN THE PAST 12 MONTHS, THE FOOD YOU BOUGHT JUST DIDN'T LAST AND YOU DIDN'T HAVE MONEY TO GET MORE.: NEVER TRUE

## 2025-03-31 ASSESSMENT — ENCOUNTER SYMPTOMS
EYE PAIN: 0
RESPIRATORY NEGATIVE: 1
SHORTNESS OF BREATH: 0
EYE DISCHARGE: 0
FACIAL SWELLING: 0
BLOOD IN STOOL: 0
ALLERGIC/IMMUNOLOGIC NEGATIVE: 1
APNEA: 0
EYE REDNESS: 0
COUGH: 0
VOMITING: 0
ANAL BLEEDING: 0
RECTAL PAIN: 0
WHEEZING: 0
DIARRHEA: 0
TROUBLE SWALLOWING: 0
CHOKING: 0
SINUS PAIN: 0
PHOTOPHOBIA: 0
CONSTIPATION: 0
CHEST TIGHTNESS: 0
EYES NEGATIVE: 1
ABDOMINAL PAIN: 0
GASTROINTESTINAL NEGATIVE: 1
ABDOMINAL DISTENTION: 0
VOICE CHANGE: 0
EYE ITCHING: 0
COLOR CHANGE: 0
STRIDOR: 0
NAUSEA: 0
BACK PAIN: 0
SINUS PRESSURE: 0
RHINORRHEA: 0
SORE THROAT: 0

## 2025-03-31 ASSESSMENT — PATIENT HEALTH QUESTIONNAIRE - PHQ9
SUM OF ALL RESPONSES TO PHQ QUESTIONS 1-9: 0
SUM OF ALL RESPONSES TO PHQ QUESTIONS 1-9: 0
1. LITTLE INTEREST OR PLEASURE IN DOING THINGS: NOT AT ALL
SUM OF ALL RESPONSES TO PHQ QUESTIONS 1-9: 0
SUM OF ALL RESPONSES TO PHQ QUESTIONS 1-9: 0
2. FEELING DOWN, DEPRESSED OR HOPELESS: NOT AT ALL

## 2025-03-31 NOTE — PROGRESS NOTES
\"Have you been to the ER, urgent care clinic since your last visit?  Hospitalized since your last visit?\"    NO    “Have you seen or consulted any other health care providers outside our system since your last visit?”    NO            
(Formerly [facility] [place])  -      DIABETES FOOT EXAM    A1c has significantly improved and this is the lowest I have seen for patient's A1c since 2022.  A1c is now noted at 5.7.  He reports no hypoglycemia.  He currently takes Xigduo 1 tablet daily.  He reports having increase in unsteady gait and also instability due to the numbness and lack of sensation to his bilateral feet.  He reports currently taking gabapentin but only take it at bedtime due to sedation side effects.  He is not able to take medication gabapentin or amitriptyline during daytime due to sedation.  He has been recommended for physical therapy to help with his gait stability and prevention of falls.  However, patient respectfully declined offer for physical therapy at this time.  Will plan to repeat labs in 6 months.    Pt is up to date with his retinopathy examination through [facility].    2. ERIN (acute kidney injury)  -     Comprehensive Metabolic Panel; Future    He believes he has been drinking plenty of fluids but creatinine has increased significantly from 0.94 with a GFR greater than 90-1.90 with EGFR decreased to 40.  He currently works outside and has been encouraged to increase his fluid intake to a gallon per day if possible.  Patient is also currently taking Celebrex twice daily.  Patient has been advised to decrease his Celebrex to once a day if possible to help with his creatinine level.  We will plan to recheck his CMP and check his creatinine level in 2 weeks.    3. Pure hypercholesterolemia   Lipid panel was at goal.  LDL noted at 57.  Continue atorvastatin 80 mg daily.    4. Essential hypertension, benign   Normotensive 124/80.  Continue atenolol and HCTZ.    5. Weight loss  -     [facility]  -     XR CHEST PA LAT (2 VIEWS); Future  6. Cough, unspecified type  -     XR CHEST PA LAT (2 VIEWS); Future  7. Nodular sclerosing Hodgkin's lymphoma, unspecified body region (HCC)  -     [facility]

## 2025-04-01 ENCOUNTER — RESULTS FOLLOW-UP (OUTPATIENT)
Dept: FAMILY MEDICINE CLINIC | Facility: CLINIC | Age: 60
End: 2025-04-01

## 2025-04-02 NOTE — PROGRESS NOTES
[facility]: Office Visit New Patient H & P    Reason for visit:  H/o HL, unintentional weight loss    History of Present Illness:  History of Present Illness  The patient is a 59-year-old male with a history of Hodgkin lymphoma, nodular sclerosis subtype, stage IIb, treated with seven cycles of ABVD. Following treatment, an equivocal PET CT scan led to an autologous peripheral blood hematopoietic stem cell transplant in 12/2001. He was previously followed by Dr. Yost, with the last follow-up in 01/2022. No systemic imaging is available more recent than a CT abdomen and pelvis renal stone protocol from 03/2024. Recently, he was evaluated by his primary care physician for chronic medical problems including type 2 diabetes mellitus and hyperlipidemia. An acute kidney injury was noted with serum creatinine of 1.9 on 03/24/2025, up from 0.94 on 09/16/2024. Borderline elevated AST at 44 was noted, with otherwise unremarkable LFTs. CBC was unremarkable. Significant weight loss was reported, from 216 pounds in 06/2024 down to 186 pounds on 03/31/2025. Given his history of lymphoma, further evaluation was recommended. Recent labs included normal free T4 on 03/24/2025, hemoglobin A1c of 5.7, PSA of 1.7, and B12 of 339.    Unintentional weight loss has been experienced, despite no significant changes in diet or physical activity levels. A weight loss regimen was initiated last year due to perceived overweight status, incorporating sauerkraut, beets, and lemon water into his diet. Initial weight loss was followed by a reduction in food intake, yet weight continues to decrease. Current weight is approaching his weight from 40 years ago when he got , which was between 170 to 175 pounds. Difficulty swallowing large pills has been reported, necessitating increased water intake, a symptom persisting for the past 1 to 2 years. Difficulty swallowing large pieces of steak is also noted. Occasional

## 2025-04-03 ENCOUNTER — OFFICE VISIT (OUTPATIENT)
Dept: ONCOLOGY | Age: 60
End: 2025-04-03
Payer: COMMERCIAL

## 2025-04-03 ENCOUNTER — HOSPITAL ENCOUNTER (OUTPATIENT)
Dept: LAB | Age: 60
Discharge: HOME | End: 2025-04-03
Payer: COMMERCIAL

## 2025-04-03 VITALS
SYSTOLIC BLOOD PRESSURE: 134 MMHG | RESPIRATION RATE: 15 BRPM | TEMPERATURE: 97.9 F | HEIGHT: 70 IN | DIASTOLIC BLOOD PRESSURE: 85 MMHG | WEIGHT: 185 LBS | OXYGEN SATURATION: 97 % | BODY MASS INDEX: 26.48 KG/M2 | HEART RATE: 73 BPM

## 2025-04-03 DIAGNOSIS — K21.9 GASTROESOPHAGEAL REFLUX DISEASE, UNSPECIFIED WHETHER ESOPHAGITIS PRESENT: ICD-10-CM

## 2025-04-03 DIAGNOSIS — C81.90 HODGKIN LYMPHOMA, UNSPECIFIED HODGKIN LYMPHOMA TYPE, UNSPECIFIED BODY REGION (HCC): ICD-10-CM

## 2025-04-03 DIAGNOSIS — C81.90 HODGKIN LYMPHOMA, UNSPECIFIED HODGKIN LYMPHOMA TYPE, UNSPECIFIED BODY REGION (HCC): Primary | ICD-10-CM

## 2025-04-03 LAB
ALBUMIN SERPL-MCNC: 4 G/DL (ref 3.5–5)
ALBUMIN/GLOB SERPL: 1.1 (ref 1–1.9)
ALP SERPL-CCNC: 50 U/L (ref 40–129)
ALT SERPL-CCNC: 38 U/L (ref 8–55)
ANION GAP SERPL CALC-SCNC: 11 MMOL/L (ref 7–16)
AST SERPL-CCNC: 59 U/L (ref 15–37)
BASOPHILS # BLD: 0.07 K/UL (ref 0–0.2)
BASOPHILS NFR BLD: 1.2 % (ref 0–2)
BILIRUB SERPL-MCNC: 0.6 MG/DL (ref 0–1.2)
BUN SERPL-MCNC: 30 MG/DL (ref 6–23)
CALCIUM SERPL-MCNC: 10.3 MG/DL (ref 8.8–10.2)
CHLORIDE SERPL-SCNC: 106 MMOL/L (ref 98–107)
CO2 SERPL-SCNC: 24 MMOL/L (ref 20–29)
CREAT SERPL-MCNC: 2.17 MG/DL (ref 0.8–1.3)
CRP SERPL-MCNC: <0.3 MG/DL (ref 0–0.4)
DIFFERENTIAL METHOD BLD: NORMAL
EOSINOPHIL # BLD: 0.17 K/UL (ref 0–0.8)
EOSINOPHIL NFR BLD: 2.8 % (ref 0.5–7.8)
ERYTHROCYTE [DISTWIDTH] IN BLOOD BY AUTOMATED COUNT: 14.1 % (ref 11.9–14.6)
ERYTHROCYTE [SEDIMENTATION RATE] IN BLOOD: 3 MM/HR (ref 0–20)
GLOBULIN SER CALC-MCNC: 3.5 G/DL (ref 2.3–3.5)
GLUCOSE SERPL-MCNC: 88 MG/DL (ref 70–99)
HAV IGM SER QL: NONREACTIVE
HBV CORE IGM SER QL: NONREACTIVE
HBV SURFACE AG SER QL: NONREACTIVE
HCT VFR BLD AUTO: 44.7 % (ref 41.1–50.3)
HCV AB SER QL: NONREACTIVE
HGB BLD-MCNC: 15.1 G/DL (ref 13.6–17.2)
HIV 1+2 AB+HIV1 P24 AG SERPL QL IA: NONREACTIVE
HIV 1/2 RESULT COMMENT: NORMAL
IMM GRANULOCYTES # BLD AUTO: 0.01 K/UL (ref 0–0.5)
IMM GRANULOCYTES NFR BLD AUTO: 0.2 % (ref 0–5)
LYMPHOCYTES # BLD: 1.97 K/UL (ref 0.5–4.6)
LYMPHOCYTES NFR BLD: 32.6 % (ref 13–44)
MCH RBC QN AUTO: 29.6 PG (ref 26.1–32.9)
MCHC RBC AUTO-ENTMCNC: 33.8 G/DL (ref 31.4–35)
MCV RBC AUTO: 87.6 FL (ref 82–102)
MONOCYTES # BLD: 0.41 K/UL (ref 0.1–1.3)
MONOCYTES NFR BLD: 6.8 % (ref 4–12)
NEUTS SEG # BLD: 3.42 K/UL (ref 1.7–8.2)
NEUTS SEG NFR BLD: 56.4 % (ref 43–78)
NRBC # BLD: 0 K/UL (ref 0–0.2)
PLATELET # BLD AUTO: 310 K/UL (ref 150–450)
PMV BLD AUTO: 9.4 FL (ref 9.4–12.3)
POTASSIUM SERPL-SCNC: 4.2 MMOL/L (ref 3.5–5.1)
PROT SERPL-MCNC: 7.5 G/DL (ref 6.3–8.2)
RBC # BLD AUTO: 5.1 M/UL (ref 4.23–5.6)
SODIUM SERPL-SCNC: 141 MMOL/L (ref 136–145)
WBC # BLD AUTO: 6.1 K/UL (ref 4.3–11.1)

## 2025-04-03 PROCEDURE — 99204 OFFICE O/P NEW MOD 45 MIN: CPT | Performed by: INTERNAL MEDICINE

## 2025-04-03 PROCEDURE — 3075F SYST BP GE 130 - 139MM HG: CPT | Performed by: INTERNAL MEDICINE

## 2025-04-03 PROCEDURE — 36415 COLL VENOUS BLD VENIPUNCTURE: CPT

## 2025-04-03 PROCEDURE — 80053 COMPREHEN METABOLIC PANEL: CPT

## 2025-04-03 PROCEDURE — 86140 C-REACTIVE PROTEIN: CPT

## 2025-04-03 PROCEDURE — 85652 RBC SED RATE AUTOMATED: CPT

## 2025-04-03 PROCEDURE — 3079F DIAST BP 80-89 MM HG: CPT | Performed by: INTERNAL MEDICINE

## 2025-04-03 PROCEDURE — 85025 COMPLETE CBC W/AUTO DIFF WBC: CPT

## 2025-04-03 PROCEDURE — 80074 ACUTE HEPATITIS PANEL: CPT

## 2025-04-03 PROCEDURE — 87389 HIV-1 AG W/HIV-1&-2 AB AG IA: CPT

## 2025-04-03 PROCEDURE — 86038 ANTINUCLEAR ANTIBODIES: CPT

## 2025-04-03 ASSESSMENT — PATIENT HEALTH QUESTIONNAIRE - PHQ9
1. LITTLE INTEREST OR PLEASURE IN DOING THINGS: NOT AT ALL
SUM OF ALL RESPONSES TO PHQ QUESTIONS 1-9: 0
SUM OF ALL RESPONSES TO PHQ QUESTIONS 1-9: 0
2. FEELING DOWN, DEPRESSED OR HOPELESS: NOT AT ALL
SUM OF ALL RESPONSES TO PHQ QUESTIONS 1-9: 0
SUM OF ALL RESPONSES TO PHQ QUESTIONS 1-9: 0

## 2025-04-03 NOTE — PATIENT INSTRUCTIONS
Patient Information from Today's Visit    The members of your [facility] are listed below:    Physician Provider: Arnold Joseph, Medical Oncologist  Advanced Practice Clinician: Marta Gilmore NP  Registered Nurse: Serena MORALES RN  Navigator: N/A  Medical Assistant: Contreras LOWERY MA  : Jazmín SINCLAIR   Supportive Care Services: Anette TONG LMSW    Diagnosis: Lymphoma      Follow Up Instructions:   - Will check labs today  - CT scan ordered, to be scheduled for the first available appointment  - Referral placed to gastroenterology for EGD and colonoscopy     Follow up in 4 weeks to discuss results    Treatment Summary has been discussed and given to patient:No      Current Labs:   No visits with results within 3 Day(s) from this visit.   Latest known visit with results is:   Lab on 03/24/2025   Component Date Value Ref Range Status    Vitamin B-12 03/24/2025 339  193 - 986 pg/mL Final    Magnesium 03/24/2025 2.3  1.8 - 2.4 mg/dL Final    Uric Acid 03/24/2025 4.4  3.9 - 8.2 MG/DL Final    PSA 03/24/2025 1.7  0.0 - 4.0 ng/mL Final    Comment: Roche ECLIA methodology  Patient's results of tumor marker testing may not be comparable to labs using different manufacturers/methods.      Albumin Urine 03/24/2025 <1.20  0.00 - 20.00 MG/DL Final    Creatinine, Ur 03/24/2025 107.00  39.00 - 259.00 mg/dL Final    Albumin/Creatinine Ratio 03/24/2025 Cannot calculate ratio due to microalbumin result outside reportable range.  0 - 30 mg/g Final    WBC 03/24/2025 6.7  4.3 - 11.1 K/uL Final    RBC 03/24/2025 5.20  4.23 - 5.6 M/uL Final    Hemoglobin 03/24/2025 15.3  13.6 - 17.2 g/dL Final    Hematocrit 03/24/2025 47.6  41.1 - 50.3 % Final    MCV 03/24/2025 91.5  82 - 102 FL Final    MCH 03/24/2025 29.4  26.1 - 32.9 PG Final    MCHC 03/24/2025 32.1  31.4 - 35.0 g/dL Final    RDW 03/24/2025 14.0  11.9 - 14.6 % Final    Platelets 03/24/2025 336  150 - 450 K/uL Final    MPV 03/24/2025 10.4  9.4 - 12.3 FL Final    nRBC

## 2025-04-04 LAB — ANA SER QL: NEGATIVE

## 2025-04-11 ENCOUNTER — HOSPITAL ENCOUNTER (OUTPATIENT)
Dept: LAB | Age: 60
Discharge: HOME | End: 2025-04-11
Payer: COMMERCIAL

## 2025-04-11 DIAGNOSIS — C81.90 HODGKIN LYMPHOMA, UNSPECIFIED HODGKIN LYMPHOMA TYPE, UNSPECIFIED BODY REGION (HCC): ICD-10-CM

## 2025-04-11 LAB
HEMOCCULT STL QL: NEGATIVE

## 2025-04-11 PROCEDURE — 82272 OCCULT BLD FECES 1-3 TESTS: CPT

## 2025-04-13 NOTE — ROUTINE PROCESS
Pt. Discharged to car by Dia Angelucci with wife. IV removed. Vital signs stable. Able to tolerate PO fluids. Passing gas.  Seen by MD.
2 seconds or less

## 2025-04-15 DIAGNOSIS — C81.90 HODGKIN LYMPHOMA, UNSPECIFIED HODGKIN LYMPHOMA TYPE, UNSPECIFIED BODY REGION (HCC): Primary | ICD-10-CM

## 2025-04-15 RX ORDER — SODIUM CHLORIDE 9 MG/ML
INJECTION, SOLUTION INTRAVENOUS CONTINUOUS
Status: CANCELLED | OUTPATIENT
Start: 2025-04-15

## 2025-04-15 RX ORDER — SODIUM CHLORIDE 0.9 % (FLUSH) 0.9 %
5-40 SYRINGE (ML) INJECTION PRN
Status: CANCELLED | OUTPATIENT
Start: 2025-04-15

## 2025-04-15 RX ORDER — HYDROCORTISONE SODIUM SUCCINATE 100 MG/2ML
100 INJECTION INTRAMUSCULAR; INTRAVENOUS
Status: CANCELLED | OUTPATIENT
Start: 2025-04-15

## 2025-04-15 RX ORDER — HEPARIN SODIUM (PORCINE) LOCK FLUSH IV SOLN 100 UNIT/ML 100 UNIT/ML
500 SOLUTION INTRAVENOUS PRN
Status: CANCELLED | OUTPATIENT
Start: 2025-04-15

## 2025-04-15 RX ORDER — DIPHENHYDRAMINE HYDROCHLORIDE 50 MG/ML
50 INJECTION, SOLUTION INTRAMUSCULAR; INTRAVENOUS
Status: CANCELLED | OUTPATIENT
Start: 2025-04-15

## 2025-04-15 RX ORDER — ONDANSETRON 2 MG/ML
8 INJECTION INTRAMUSCULAR; INTRAVENOUS
Status: CANCELLED | OUTPATIENT
Start: 2025-04-15

## 2025-04-15 RX ORDER — FAMOTIDINE 10 MG/ML
20 INJECTION, SOLUTION INTRAVENOUS
Status: CANCELLED | OUTPATIENT
Start: 2025-04-15

## 2025-04-15 RX ORDER — ALBUTEROL SULFATE 90 UG/1
4 INHALANT RESPIRATORY (INHALATION) PRN
Status: CANCELLED | OUTPATIENT
Start: 2025-04-15

## 2025-04-15 RX ORDER — SODIUM CHLORIDE 9 MG/ML
5-250 INJECTION, SOLUTION INTRAVENOUS PRN
Status: CANCELLED | OUTPATIENT
Start: 2025-04-15

## 2025-04-15 RX ORDER — ACETAMINOPHEN 325 MG/1
650 TABLET ORAL
Status: CANCELLED | OUTPATIENT
Start: 2025-04-15

## 2025-04-15 RX ORDER — 0.9 % SODIUM CHLORIDE 0.9 %
1000 INTRAVENOUS SOLUTION INTRAVENOUS ONCE
Status: CANCELLED | OUTPATIENT
Start: 2025-04-15 | End: 2025-04-15

## 2025-04-15 RX ORDER — EPINEPHRINE 1 MG/ML
0.3 INJECTION, SOLUTION, CONCENTRATE INTRAVENOUS PRN
Status: CANCELLED | OUTPATIENT
Start: 2025-04-15

## 2025-04-17 ENCOUNTER — OFFICE VISIT (OUTPATIENT)
Dept: FAMILY MEDICINE CLINIC | Facility: CLINIC | Age: 60
End: 2025-04-17
Payer: COMMERCIAL

## 2025-04-17 ENCOUNTER — HOSPITAL ENCOUNTER (OUTPATIENT)
Dept: INFUSION THERAPY | Age: 60
Setting detail: INFUSION SERIES
Discharge: HOME | End: 2025-04-17
Payer: COMMERCIAL

## 2025-04-17 VITALS
DIASTOLIC BLOOD PRESSURE: 82 MMHG | SYSTOLIC BLOOD PRESSURE: 124 MMHG | HEART RATE: 60 BPM | OXYGEN SATURATION: 100 % | BODY MASS INDEX: 25.54 KG/M2 | WEIGHT: 178.4 LBS | HEIGHT: 70 IN

## 2025-04-17 DIAGNOSIS — E11.40 CONTROLLED TYPE 2 DIABETES MELLITUS WITH NEUROPATHY (HCC): ICD-10-CM

## 2025-04-17 DIAGNOSIS — J30.89 OTHER ALLERGIC RHINITIS: ICD-10-CM

## 2025-04-17 DIAGNOSIS — L24.7 CONTACT DERMATITIS AND ECZEMA DUE TO PLANT: ICD-10-CM

## 2025-04-17 DIAGNOSIS — K59.00 CONSTIPATION, UNSPECIFIED CONSTIPATION TYPE: ICD-10-CM

## 2025-04-17 DIAGNOSIS — C81.90 HODGKIN LYMPHOMA, UNSPECIFIED HODGKIN LYMPHOMA TYPE, UNSPECIFIED BODY REGION (HCC): Primary | ICD-10-CM

## 2025-04-17 DIAGNOSIS — E55.9 VITAMIN D DEFICIENCY: ICD-10-CM

## 2025-04-17 DIAGNOSIS — C81.10 NODULAR SCLEROSING HODGKIN'S LYMPHOMA, UNSPECIFIED BODY REGION (HCC): Primary | ICD-10-CM

## 2025-04-17 DIAGNOSIS — N17.9 AKI (ACUTE KIDNEY INJURY): ICD-10-CM

## 2025-04-17 PROCEDURE — 2500000003 HC RX 250 WO HCPCS: Performed by: INTERNAL MEDICINE

## 2025-04-17 PROCEDURE — 3074F SYST BP LT 130 MM HG: CPT | Performed by: NURSE PRACTITIONER

## 2025-04-17 PROCEDURE — 99214 OFFICE O/P EST MOD 30 MIN: CPT | Performed by: NURSE PRACTITIONER

## 2025-04-17 PROCEDURE — 2580000003 HC RX 258: Performed by: INTERNAL MEDICINE

## 2025-04-17 PROCEDURE — 96360 HYDRATION IV INFUSION INIT: CPT

## 2025-04-17 PROCEDURE — 3079F DIAST BP 80-89 MM HG: CPT | Performed by: NURSE PRACTITIONER

## 2025-04-17 PROCEDURE — 96372 THER/PROPH/DIAG INJ SC/IM: CPT | Performed by: NURSE PRACTITIONER

## 2025-04-17 PROCEDURE — 3044F HG A1C LEVEL LT 7.0%: CPT | Performed by: NURSE PRACTITIONER

## 2025-04-17 RX ORDER — DIPHENHYDRAMINE HYDROCHLORIDE 50 MG/ML
50 INJECTION, SOLUTION INTRAMUSCULAR; INTRAVENOUS
OUTPATIENT
Start: 2025-04-17

## 2025-04-17 RX ORDER — HEPARIN 100 UNIT/ML
500 SYRINGE INTRAVENOUS PRN
OUTPATIENT
Start: 2025-04-17

## 2025-04-17 RX ORDER — SODIUM CHLORIDE 9 MG/ML
5-250 INJECTION, SOLUTION INTRAVENOUS PRN
OUTPATIENT
Start: 2025-04-17

## 2025-04-17 RX ORDER — EPINEPHRINE 1 MG/ML
0.3 INJECTION, SOLUTION, CONCENTRATE INTRAVENOUS PRN
OUTPATIENT
Start: 2025-04-17

## 2025-04-17 RX ORDER — SODIUM CHLORIDE 9 MG/ML
INJECTION, SOLUTION INTRAVENOUS CONTINUOUS
OUTPATIENT
Start: 2025-04-17

## 2025-04-17 RX ORDER — 0.9 % SODIUM CHLORIDE 0.9 %
1000 INTRAVENOUS SOLUTION INTRAVENOUS ONCE
Status: COMPLETED | OUTPATIENT
Start: 2025-04-17 | End: 2025-04-17

## 2025-04-17 RX ORDER — FLUTICASONE PROPIONATE 50 MCG
1 SPRAY, SUSPENSION (ML) NASAL 2 TIMES DAILY
Qty: 32 G | Refills: 1 | Status: SHIPPED | OUTPATIENT
Start: 2025-04-17

## 2025-04-17 RX ORDER — ALBUTEROL SULFATE 90 UG/1
4 INHALANT RESPIRATORY (INHALATION) PRN
OUTPATIENT
Start: 2025-04-17

## 2025-04-17 RX ORDER — SODIUM CHLORIDE 0.9 % (FLUSH) 0.9 %
5-40 SYRINGE (ML) INJECTION PRN
Status: DISCONTINUED | OUTPATIENT
Start: 2025-04-17 | End: 2025-04-18 | Stop reason: HOSPADM

## 2025-04-17 RX ORDER — DAPAGLIFLOZIN 5 MG/1
5 TABLET, FILM COATED ORAL EVERY MORNING
Qty: 90 TABLET | Refills: 1 | Status: ON HOLD | OUTPATIENT
Start: 2025-04-17 | End: 2025-04-19 | Stop reason: HOSPADM

## 2025-04-17 RX ORDER — SODIUM CHLORIDE 0.9 % (FLUSH) 0.9 %
5-40 SYRINGE (ML) INJECTION PRN
OUTPATIENT
Start: 2025-04-17

## 2025-04-17 RX ORDER — HYDROCORTISONE SODIUM SUCCINATE 100 MG/2ML
100 INJECTION INTRAMUSCULAR; INTRAVENOUS
OUTPATIENT
Start: 2025-04-17

## 2025-04-17 RX ORDER — ACETAMINOPHEN 325 MG/1
650 TABLET ORAL
OUTPATIENT
Start: 2025-04-17

## 2025-04-17 RX ORDER — ONDANSETRON 2 MG/ML
8 INJECTION INTRAMUSCULAR; INTRAVENOUS
OUTPATIENT
Start: 2025-04-17

## 2025-04-17 RX ORDER — AZELASTINE 1 MG/ML
1 SPRAY, METERED NASAL 2 TIMES DAILY
Qty: 60 ML | Refills: 1 | Status: SHIPPED | OUTPATIENT
Start: 2025-04-17

## 2025-04-17 RX ORDER — 0.9 % SODIUM CHLORIDE 0.9 %
1000 INTRAVENOUS SOLUTION INTRAVENOUS ONCE
Status: CANCELLED | OUTPATIENT
Start: 2025-04-17 | End: 2025-04-17

## 2025-04-17 RX ORDER — DAPAGLIFLOZIN 5 MG/1
5 TABLET, FILM COATED ORAL EVERY MORNING
Qty: 30 TABLET | Refills: 0 | Status: ON HOLD | OUTPATIENT
Start: 2025-04-17 | End: 2025-04-19 | Stop reason: HOSPADM

## 2025-04-17 RX ORDER — TRIAMCINOLONE ACETONIDE 40 MG/ML
40 INJECTION, SUSPENSION INTRA-ARTICULAR; INTRAMUSCULAR ONCE
Status: COMPLETED | OUTPATIENT
Start: 2025-04-17 | End: 2025-04-17

## 2025-04-17 RX ADMIN — TRIAMCINOLONE ACETONIDE 40 MG: 40 INJECTION, SUSPENSION INTRA-ARTICULAR; INTRAMUSCULAR at 08:22

## 2025-04-17 RX ADMIN — SODIUM CHLORIDE 1000 ML: 0.9 INJECTION, SOLUTION INTRAVENOUS at 09:15

## 2025-04-17 RX ADMIN — SODIUM CHLORIDE, PRESERVATIVE FREE 10 ML: 5 INJECTION INTRAVENOUS at 09:12

## 2025-04-17 ASSESSMENT — ENCOUNTER SYMPTOMS
EYE DISCHARGE: 0
DIARRHEA: 0
BACK PAIN: 0
PHOTOPHOBIA: 0
EYE ITCHING: 0
FACIAL SWELLING: 0
CHEST TIGHTNESS: 0
SINUS PRESSURE: 1
CHOKING: 0
EYE PAIN: 0
NAUSEA: 0
COUGH: 0
STRIDOR: 0
APNEA: 0
WHEEZING: 0
VOMITING: 0
ANAL BLEEDING: 0
EYE REDNESS: 0
COLOR CHANGE: 0
GASTROINTESTINAL NEGATIVE: 1
VOICE CHANGE: 1
ALLERGIC/IMMUNOLOGIC NEGATIVE: 1
EYES NEGATIVE: 1
TROUBLE SWALLOWING: 0
ABDOMINAL DISTENTION: 0
RECTAL PAIN: 0
BLOOD IN STOOL: 0
SINUS PAIN: 1
ABDOMINAL PAIN: 0
RESPIRATORY NEGATIVE: 1
SHORTNESS OF BREATH: 0
SORE THROAT: 1
RHINORRHEA: 0
CONSTIPATION: 0

## 2025-04-17 ASSESSMENT — PATIENT HEALTH QUESTIONNAIRE - PHQ9
SUM OF ALL RESPONSES TO PHQ QUESTIONS 1-9: 0
1. LITTLE INTEREST OR PLEASURE IN DOING THINGS: NOT AT ALL
SUM OF ALL RESPONSES TO PHQ QUESTIONS 1-9: 0
2. FEELING DOWN, DEPRESSED OR HOPELESS: NOT AT ALL
SUM OF ALL RESPONSES TO PHQ QUESTIONS 1-9: 0
SUM OF ALL RESPONSES TO PHQ QUESTIONS 1-9: 0

## 2025-04-17 NOTE — PROGRESS NOTES
PROGRESS NOTE    Chief Complaint   Patient presents with    Follow-up       SUBJECTIVE:         History of Present Illness  The patient is a 59-year-old male with a history of Hodgkin lymphoma, currently established and following with oncology, elevated liver function, currently following GI, diabetes, hypertension and hyperlipidemia.    He presents to the office for follow-up.  He was seen 2 weeks prior.  He was referred to hematology/oncology to reestablish care for his Hodgkin lymphoma follow-up in addition to new abnormal weight loss as he has not been seen in some time.  He also was noted to have an increase in creatinine level that is new for him.  He has been advised to decrease his Celebrex intake and to increase his fluid intake.  He is noted to have had recent labs on 4/3 that showed increase in worsening creatinine level from 1.9 with EGFR of 40 now to 2.17 with EGFR in the 30s.      Today, he reports no significant changes in his health status since the last consultation.  He is scheduled for an infusion today at 11:00 AM for fluids.  He also has a CT scan scheduled for today at 1:45 PM. He expresses concern about the possibility of cancer recurrence. He notes that his weight is similar to what it was 40 years ago when he got .     Hoarseness is reported, which is attributed to exposure to pollen while mowing the lawn. Nasal sprays or allergy medications have not been used but he is open to trying them. Frequent dirt accumulation in his nose is cleaned out when it becomes dry.    Daily application of lotion is reported, and efforts to increase fluid intake have been made.    Blood sugar levels are not monitored at home as they have been stable. He believes he has a meter at home.    Constipation is reported.    Patient reports he was exposed to Poison ivy and has a rash with itching.  He requested a Kenalog injection.  He has creams for treatment for it at home.        Past Medical History,

## 2025-04-17 NOTE — PROGRESS NOTES
Patient arrived to infusion. 1L IVF completed. Patient tolerated without difficulty. PIV removed and patient discharged ambulatory. Patient is aware of follow up appts.

## 2025-04-18 ENCOUNTER — TELEPHONE (OUTPATIENT)
Dept: ONCOLOGY | Age: 60
End: 2025-04-18

## 2025-04-18 ENCOUNTER — HOSPITAL ENCOUNTER (INPATIENT)
Age: 60
LOS: 2 days | Discharge: HOME | DRG: 660 | End: 2025-04-20
Attending: EMERGENCY MEDICINE | Admitting: INTERNAL MEDICINE
Payer: COMMERCIAL

## 2025-04-18 ENCOUNTER — TELEPHONE (OUTPATIENT)
Dept: UROLOGY | Age: 60
End: 2025-04-18

## 2025-04-18 DIAGNOSIS — N17.9 AKI (ACUTE KIDNEY INJURY): ICD-10-CM

## 2025-04-18 DIAGNOSIS — N20.0 RENAL STONE: ICD-10-CM

## 2025-04-18 DIAGNOSIS — N20.0 NEPHROLITHIASIS: Primary | ICD-10-CM

## 2025-04-18 PROBLEM — N13.4 HYDROURETER: Status: ACTIVE | Noted: 2025-04-18

## 2025-04-18 PROBLEM — R74.8 ELEVATED LIVER ENZYMES: Status: ACTIVE | Noted: 2025-04-18

## 2025-04-18 PROBLEM — C81.90 HODGKIN'S LYMPHOMA (HCC): Status: ACTIVE | Noted: 2025-04-18

## 2025-04-18 PROBLEM — N13.30 HYDRONEPHROSIS: Status: ACTIVE | Noted: 2025-04-18

## 2025-04-18 LAB
ALBUMIN SERPL-MCNC: 4 G/DL (ref 3.5–5)
ALBUMIN/GLOB SERPL: 1.2 (ref 1–1.9)
ALP SERPL-CCNC: 55 U/L (ref 40–129)
ALT SERPL-CCNC: 30 U/L (ref 8–55)
ANION GAP SERPL CALC-SCNC: 11 MMOL/L (ref 7–16)
APPEARANCE UR: CLEAR
AST SERPL-CCNC: 46 U/L (ref 15–37)
BACTERIA URNS QL MICRO: NEGATIVE /HPF
BASOPHILS # BLD: 0.07 K/UL (ref 0–0.2)
BASOPHILS NFR BLD: 0.9 % (ref 0–2)
BILIRUB SERPL-MCNC: 0.3 MG/DL (ref 0–1.2)
BILIRUB UR QL: NEGATIVE
BUN SERPL-MCNC: 27 MG/DL (ref 6–23)
CALCIUM SERPL-MCNC: 9.1 MG/DL (ref 8.8–10.2)
CHLORIDE SERPL-SCNC: 105 MMOL/L (ref 98–107)
CO2 SERPL-SCNC: 24 MMOL/L (ref 20–29)
COLOR UR: ABNORMAL
CREAT SERPL-MCNC: 2.06 MG/DL (ref 0.8–1.3)
DIFFERENTIAL METHOD BLD: NORMAL
EOSINOPHIL # BLD: 0.14 K/UL (ref 0–0.8)
EOSINOPHIL NFR BLD: 1.7 % (ref 0.5–7.8)
EPI CELLS #/AREA URNS HPF: ABNORMAL /HPF
ERYTHROCYTE [DISTWIDTH] IN BLOOD BY AUTOMATED COUNT: 14.5 % (ref 11.9–14.6)
GLOBULIN SER CALC-MCNC: 3.4 G/DL (ref 2.3–3.5)
GLUCOSE BLD STRIP.AUTO-MCNC: 130 MG/DL (ref 65–100)
GLUCOSE BLD STRIP.AUTO-MCNC: 82 MG/DL (ref 65–100)
GLUCOSE SERPL-MCNC: 123 MG/DL (ref 70–99)
GLUCOSE UR STRIP.AUTO-MCNC: >1000 MG/DL
HCT VFR BLD AUTO: 41.6 % (ref 41.1–50.3)
HGB BLD-MCNC: 14.3 G/DL (ref 13.6–17.2)
HGB UR QL STRIP: ABNORMAL
HYALINE CASTS URNS QL MICRO: ABNORMAL /LPF
IMM GRANULOCYTES # BLD AUTO: 0.03 K/UL (ref 0–0.5)
IMM GRANULOCYTES NFR BLD AUTO: 0.4 % (ref 0–5)
KETONES UR QL STRIP.AUTO: NEGATIVE MG/DL
LEUKOCYTE ESTERASE UR QL STRIP.AUTO: NEGATIVE
LYMPHOCYTES # BLD: 2.15 K/UL (ref 0.5–4.6)
LYMPHOCYTES NFR BLD: 26.8 % (ref 13–44)
MCH RBC QN AUTO: 29.7 PG (ref 26.1–32.9)
MCHC RBC AUTO-ENTMCNC: 34.4 G/DL (ref 31.4–35)
MCV RBC AUTO: 86.3 FL (ref 82–102)
MONOCYTES # BLD: 0.59 K/UL (ref 0.1–1.3)
MONOCYTES NFR BLD: 7.4 % (ref 4–12)
NEUTS SEG # BLD: 5.04 K/UL (ref 1.7–8.2)
NEUTS SEG NFR BLD: 62.8 % (ref 43–78)
NITRITE UR QL STRIP.AUTO: NEGATIVE
NRBC # BLD: 0 K/UL (ref 0–0.2)
PH UR STRIP: 6 (ref 5–9)
PLATELET # BLD AUTO: 343 K/UL (ref 150–450)
PMV BLD AUTO: 9.6 FL (ref 9.4–12.3)
POTASSIUM SERPL-SCNC: 4.2 MMOL/L (ref 3.5–5.1)
PROT SERPL-MCNC: 7.4 G/DL (ref 6.3–8.2)
PROT UR STRIP-MCNC: NEGATIVE MG/DL
RBC # BLD AUTO: 4.82 M/UL (ref 4.23–5.6)
RBC #/AREA URNS HPF: ABNORMAL /HPF
SERVICE CMNT-IMP: ABNORMAL
SERVICE CMNT-IMP: NORMAL
SODIUM SERPL-SCNC: 139 MMOL/L (ref 136–145)
SP GR UR REFRACTOMETRY: 1.03 (ref 1–1.02)
UROBILINOGEN UR QL STRIP.AUTO: 0.2 EU/DL (ref 0.2–1)
WBC # BLD AUTO: 8 K/UL (ref 4.3–11.1)
WBC URNS QL MICRO: ABNORMAL /HPF

## 2025-04-18 PROCEDURE — 6370000000 HC RX 637 (ALT 250 FOR IP): Performed by: NURSE PRACTITIONER

## 2025-04-18 PROCEDURE — 80053 COMPREHEN METABOLIC PANEL: CPT

## 2025-04-18 PROCEDURE — 2500000003 HC RX 250 WO HCPCS: Performed by: INTERNAL MEDICINE

## 2025-04-18 PROCEDURE — 87086 URINE CULTURE/COLONY COUNT: CPT

## 2025-04-18 PROCEDURE — 2580000003 HC RX 258: Performed by: NURSE PRACTITIONER

## 2025-04-18 PROCEDURE — 85025 COMPLETE CBC W/AUTO DIFF WBC: CPT

## 2025-04-18 PROCEDURE — 99222 1ST HOSP IP/OBS MODERATE 55: CPT | Performed by: NURSE PRACTITIONER

## 2025-04-18 PROCEDURE — 1100000000 HC RM PRIVATE

## 2025-04-18 PROCEDURE — 6370000000 HC RX 637 (ALT 250 FOR IP): Performed by: INTERNAL MEDICINE

## 2025-04-18 PROCEDURE — 81001 URINALYSIS AUTO W/SCOPE: CPT

## 2025-04-18 PROCEDURE — 82962 GLUCOSE BLOOD TEST: CPT

## 2025-04-18 PROCEDURE — 99285 EMERGENCY DEPT VISIT HI MDM: CPT

## 2025-04-18 RX ORDER — DEXTROSE MONOHYDRATE 100 MG/ML
INJECTION, SOLUTION INTRAVENOUS CONTINUOUS PRN
Status: DISCONTINUED | OUTPATIENT
Start: 2025-04-18 | End: 2025-04-19 | Stop reason: SDUPTHER

## 2025-04-18 RX ORDER — INSULIN LISPRO 100 [IU]/ML
0-4 INJECTION, SOLUTION INTRAVENOUS; SUBCUTANEOUS EVERY 6 HOURS SCHEDULED
Status: DISCONTINUED | OUTPATIENT
Start: 2025-04-18 | End: 2025-04-20 | Stop reason: HOSPADM

## 2025-04-18 RX ORDER — SODIUM CHLORIDE 0.9 % (FLUSH) 0.9 %
5-40 SYRINGE (ML) INJECTION EVERY 12 HOURS SCHEDULED
Status: DISCONTINUED | OUTPATIENT
Start: 2025-04-18 | End: 2025-04-20 | Stop reason: HOSPADM

## 2025-04-18 RX ORDER — IBUPROFEN 600 MG/1
1 TABLET ORAL PRN
Status: DISCONTINUED | OUTPATIENT
Start: 2025-04-18 | End: 2025-04-19 | Stop reason: SDUPTHER

## 2025-04-18 RX ORDER — EZETIMIBE 10 MG/1
10 TABLET ORAL DAILY
Status: DISCONTINUED | OUTPATIENT
Start: 2025-04-19 | End: 2025-04-20 | Stop reason: HOSPADM

## 2025-04-18 RX ORDER — GABAPENTIN 300 MG/1
300 CAPSULE ORAL NIGHTLY
Status: DISCONTINUED | OUTPATIENT
Start: 2025-04-18 | End: 2025-04-20 | Stop reason: HOSPADM

## 2025-04-18 RX ORDER — MORPHINE SULFATE 2 MG/ML
2 INJECTION, SOLUTION INTRAMUSCULAR; INTRAVENOUS EVERY 4 HOURS PRN
Status: DISCONTINUED | OUTPATIENT
Start: 2025-04-18 | End: 2025-04-20 | Stop reason: HOSPADM

## 2025-04-18 RX ORDER — HYDROCODONE BITARTRATE AND ACETAMINOPHEN 5; 325 MG/1; MG/1
1 TABLET ORAL EVERY 4 HOURS PRN
Refills: 0 | Status: DISCONTINUED | OUTPATIENT
Start: 2025-04-18 | End: 2025-04-20 | Stop reason: HOSPADM

## 2025-04-18 RX ORDER — ATENOLOL 25 MG/1
25 TABLET ORAL NIGHTLY
Status: DISCONTINUED | OUTPATIENT
Start: 2025-04-18 | End: 2025-04-20 | Stop reason: HOSPADM

## 2025-04-18 RX ORDER — SODIUM CHLORIDE 9 MG/ML
INJECTION, SOLUTION INTRAVENOUS CONTINUOUS
Status: ACTIVE | OUTPATIENT
Start: 2025-04-18 | End: 2025-04-19

## 2025-04-18 RX ORDER — SODIUM CHLORIDE 0.9 % (FLUSH) 0.9 %
5-40 SYRINGE (ML) INJECTION PRN
Status: DISCONTINUED | OUTPATIENT
Start: 2025-04-18 | End: 2025-04-20 | Stop reason: HOSPADM

## 2025-04-18 RX ORDER — POLYETHYLENE GLYCOL 3350 17 G/17G
17 POWDER, FOR SOLUTION ORAL DAILY PRN
Status: DISCONTINUED | OUTPATIENT
Start: 2025-04-18 | End: 2025-04-20 | Stop reason: HOSPADM

## 2025-04-18 RX ORDER — ACETAMINOPHEN 650 MG/1
650 SUPPOSITORY RECTAL EVERY 6 HOURS PRN
Status: DISCONTINUED | OUTPATIENT
Start: 2025-04-18 | End: 2025-04-20 | Stop reason: HOSPADM

## 2025-04-18 RX ORDER — TAMSULOSIN HYDROCHLORIDE 0.4 MG/1
0.4 CAPSULE ORAL
Status: DISCONTINUED | OUTPATIENT
Start: 2025-04-18 | End: 2025-04-20 | Stop reason: HOSPADM

## 2025-04-18 RX ORDER — PANTOPRAZOLE SODIUM 40 MG/1
40 TABLET, DELAYED RELEASE ORAL
Status: DISCONTINUED | OUTPATIENT
Start: 2025-04-19 | End: 2025-04-20 | Stop reason: HOSPADM

## 2025-04-18 RX ORDER — ASPIRIN 81 MG/1
81 TABLET ORAL NIGHTLY
Status: DISCONTINUED | OUTPATIENT
Start: 2025-04-19 | End: 2025-04-20 | Stop reason: HOSPADM

## 2025-04-18 RX ORDER — AMITRIPTYLINE HYDROCHLORIDE 50 MG/1
50 TABLET ORAL NIGHTLY PRN
Status: DISCONTINUED | OUTPATIENT
Start: 2025-04-18 | End: 2025-04-20 | Stop reason: HOSPADM

## 2025-04-18 RX ORDER — ONDANSETRON 4 MG/1
4 TABLET, ORALLY DISINTEGRATING ORAL EVERY 8 HOURS PRN
Status: DISCONTINUED | OUTPATIENT
Start: 2025-04-18 | End: 2025-04-20 | Stop reason: HOSPADM

## 2025-04-18 RX ORDER — TAMSULOSIN HYDROCHLORIDE 0.4 MG/1
0.4 CAPSULE ORAL DAILY
Status: DISCONTINUED | OUTPATIENT
Start: 2025-04-18 | End: 2025-04-18 | Stop reason: SDUPTHER

## 2025-04-18 RX ORDER — FLUTICASONE PROPIONATE 50 MCG
1 SPRAY, SUSPENSION (ML) NASAL DAILY PRN
Status: DISCONTINUED | OUTPATIENT
Start: 2025-04-18 | End: 2025-04-20 | Stop reason: HOSPADM

## 2025-04-18 RX ORDER — ACETAMINOPHEN 325 MG/1
650 TABLET ORAL EVERY 6 HOURS PRN
Status: DISCONTINUED | OUTPATIENT
Start: 2025-04-18 | End: 2025-04-20 | Stop reason: HOSPADM

## 2025-04-18 RX ORDER — SODIUM CHLORIDE 9 MG/ML
INJECTION, SOLUTION INTRAVENOUS PRN
Status: DISCONTINUED | OUTPATIENT
Start: 2025-04-18 | End: 2025-04-20 | Stop reason: HOSPADM

## 2025-04-18 RX ORDER — ATORVASTATIN CALCIUM 80 MG/1
80 TABLET, FILM COATED ORAL NIGHTLY
Status: DISCONTINUED | OUTPATIENT
Start: 2025-04-18 | End: 2025-04-20 | Stop reason: HOSPADM

## 2025-04-18 RX ORDER — FENOFIBRATE 160 MG/1
160 TABLET ORAL
Status: DISCONTINUED | OUTPATIENT
Start: 2025-04-18 | End: 2025-04-20 | Stop reason: HOSPADM

## 2025-04-18 RX ORDER — ONDANSETRON 2 MG/ML
4 INJECTION INTRAMUSCULAR; INTRAVENOUS EVERY 6 HOURS PRN
Status: DISCONTINUED | OUTPATIENT
Start: 2025-04-18 | End: 2025-04-20 | Stop reason: HOSPADM

## 2025-04-18 RX ADMIN — SODIUM CHLORIDE: 0.9 INJECTION, SOLUTION INTRAVENOUS at 17:08

## 2025-04-18 RX ADMIN — GABAPENTIN 300 MG: 300 CAPSULE ORAL at 20:33

## 2025-04-18 RX ADMIN — ATORVASTATIN CALCIUM 80 MG: 80 TABLET, FILM COATED ORAL at 20:33

## 2025-04-18 RX ADMIN — SODIUM CHLORIDE, PRESERVATIVE FREE 10 ML: 5 INJECTION INTRAVENOUS at 20:35

## 2025-04-18 RX ADMIN — TAMSULOSIN HYDROCHLORIDE 0.4 MG: 0.4 CAPSULE ORAL at 20:33

## 2025-04-18 RX ADMIN — ATENOLOL 25 MG: 25 TABLET ORAL at 20:33

## 2025-04-18 RX ADMIN — FENOFIBRATE 160 MG: 160 TABLET ORAL at 20:33

## 2025-04-18 ASSESSMENT — PAIN - FUNCTIONAL ASSESSMENT: PAIN_FUNCTIONAL_ASSESSMENT: 0-10

## 2025-04-18 ASSESSMENT — PAIN SCALES - GENERAL: PAINLEVEL_OUTOF10: 0

## 2025-04-18 NOTE — H&P
Hospitalist History and Physical   Admit Date:  2025  1:44 PM   Name:  Zane Fitch   Age:  59 y.o.  Sex:  male  :  1965   MRN:  [id number]   Room:  [place] [address]    Presenting/Chief Complaint: Nephrolithiasis     Reason(s) for Admission: Nephrolithiasis [N20.0]     History of Present Illness:     Zane Fitch is a 59 y.o. male with medical history of :      -hodgkins lymphoma followed by oncology  -DM2  -HTN  -elevated LFTS  -ERIN/CKD  -weight loss   -nephrolithiasis, prior stents and nephrostomy     Sent to the ED for workup of abnormal CT scan Chest/ AP showing\"     No evidence of lymphoma.     Obstructing 10 mm calculus in the distal right ureter with moderate upstream  hydronephrosis. Additional nonobstructing bilateral renal calculi measuring up  to 13 mm within the right kidney.     Mildly enlarged prostate. Correlate with PSA.     Sigmoid diverticulosis.\"      Denies pain    Creatinine 2.06 and has been elevated on recent trends   Has outpatient referral to nephrology by PCP     UA negative       FULL CODE  Wife Vidya present [phone]      Assessment & Plan:     Principal Problem:    Nephrolithiasis  Plan:    Hydronephrosis  Plan:     Hydroureter  Plan:   Admit medical bed  NPO  Urology consult -sent perfect serve to on call provider   UA not infected appearing- hold antibiotics         Active Problems:    Type 2 diabetes mellitus  Plan:   SSI   Hold oral meds         Hypertension  Plan:   atenolol         ERIN (acute kidney injury)  Plan:  vs CKD:  He has recent increased creatinine and pending nephrology referral   Has obstruction noted on images -urology consulted   Add NS 100cc/hr and trend labs  S/p CT today with contrast   If worsens while admitted then can engage nephrology sooner   Trend daily BMP           Hodgkin's lymphoma (HCC)  Plan:   Defer to oncology           Elevated liver enzymes  Plan:   Trend LFTs      PT/OT evals ordered?  Not ordered; patient not

## 2025-04-18 NOTE — ED TRIAGE NOTES
Patient arrived with a complaint of potential kidney stone per oncology office and wanted patient to be seen.

## 2025-04-18 NOTE — ED NOTES
TRANSFER - OUT REPORT:    Verbal report given to Margaret AZEVEDO on Zane Fitch  being transferred to [facility] for routine progression of patient care       Report consisted of patient's Situation, Background, Assessment and   Recommendations(SBAR).     Information from the following report(s) Nurse Handoff Report, ED Encounter Summary, and ED SBAR was reviewed with the receiving nurse.    [place] Assessment:                           Lines:   Peripheral IV 04/18/25 Left Forearm (Active)   Site Assessment Clean, dry & intact 04/18/25 1404        Opportunity for questions and clarification was provided.      Patient transported with:  Manoj Gary RN  04/18/25 0640

## 2025-04-18 NOTE — PROGRESS NOTES
TRANSFER - IN REPORT:    Verbal report received from ROBERTO CARLOS Aranda on Zane Fitch  being received from ER for routine progression of patient care      Report consisted of patient's Situation, Background, Assessment and   Recommendations(SBAR).     Information from the following report(s) ED SBAR, Adult Overview, and Recent Results was reviewed with the receiving nurse.    Opportunity for questions and clarification was provided.      Assessment completed upon patient's arrival to unit and care assumed.

## 2025-04-18 NOTE — ED PROVIDER NOTES
Emergency Department Provider Note       PCP: Shima Gabrer, APRN - CNP   Age: 59 y.o.   Sex: male     DISPOSITION Admitted 04/18/2025 03:33:18 PM    ICD-10-CM    1. Nephrolithiasis  N20.0       2. ERIN (acute kidney injury)  N17.9           Medical Decision Making     59-year-old male who initially presented with a chief complaint of abnormal CT scan results from oncology.  Patient reports that he was told he had two kidney stones on his CT scan and needed to be evaluated in the ER.  Upon initial evaluation, patient was not acutely distressed, denied pain at this time, baseline labs obtained.  CT revealed bilateral nonobstructing renal calculi, largest of which is within the right kidney measuring 13 mm, there is also moderate right hydronephrosis and hydroureter with an obstructing stone in the distal ureter measuring 10 mm.  UA revealed dehydration, USG 1.027, small blood, >1000 glucose - currently on Farxiga.  No leukocytosis, no left shift H&H stable no thrombocytopenia.  No electrolyte derangement, mild AST elevation 46.  ERIN noted, BUN 27, creatinine 2.06, EGFR 36.  Per chart review, last known normal renal function September 2024 with creatinine of 0.94, BUN 18, GFR >90.  Case discussed with urology and hospitalist.  Patient deemed suitable for admission.  Patient transferred to the floor, urology consulting.     1 acute, uncomplicated illness or injury.  Discussion with external consultants.  Shared medical decision making was utilized in creating the patients health plan today.  I independently ordered and reviewed each unique test.               The patient was admitted and I have discussed patient management with the admitting provider.  The management of this patient was discussed with an external consultant.            History     58yo ambulatory M who presents with a chief complaint of nephrolithiasis on CT scan which resulted from oncology yesterday. CT revealed: \"There are bilateral

## 2025-04-18 NOTE — TELEPHONE ENCOUNTER
Contacted pt regarding CT scan results - informed of scan result. \"Obstructing 10 mm calculus in the distal right ureter with moderate upstream hydronephrosis. Additional nonobstructing bilateral renal calculi measuring up to 13 mm within the right kidney.\"    Per NP Mando, pt can call Dr. Valadez and see if they can work him in today. If he's symptomatic with pain or fevers he should go to the ER.      Pt states he is not currently having any pain/fevers.  Relayed recommendations to pt - pt verbalized understanding and intention to call Dr. Valadez's office now for an appointment. Provided pt number to their office - read back and verified.

## 2025-04-18 NOTE — CONSULTS
Urology Consult    Requesting MD:     Patient: Zane Fitch MRN: [id number]  SSN: [id number]    YOB: 1965  Age: 59 y.o.  Sex: male      Subjective:      Zane Fitch is a 59 y.o. male who with medical history of :        -hodgkins lymphoma followed by oncology  -DM2  -HTN  -elevated LFTS  -ERIN/CKD  -weight loss   -nephrolithiasis, prior stents and nephrostomy      Sent to the ED for workup of abnormal CT scan Chest/ AP showing\"     No evidence of lymphoma.     Obstructing 10 mm calculus in the distal right ureter with moderate upstream  hydronephrosis. Additional nonobstructing bilateral renal calculi measuring up  to 13 mm within the right kidney.     Mildly enlarged prostate. Correlate with PSA.     Sigmoid diverticulosis.\"        Denies pain     Creatinine 2.06 and has been elevated on recent trends   Has outpatient referral to nephrology by PCP      UA negative         Urology consult for 10 mm right distal ureteral stone.    Patient seen at bedside. Patient is known to our practice followed by Dr. Valadez for kidney stones. Patient was sent to the ED by his oncology office because ureteral stone seen on CT. He denies pain, fever, or nausea.     Past Medical History:   Diagnosis Date    Diabetes (HCC)     type 2- oral agents- does not check glucose; denies hypoglycemia;    Generalized osteoarthrosis, unspecified site 9/7/2013    knees    GERD (gastroesophageal reflux disease)     prn meds    Hepatomegaly 9/7/2013    History of athlete's foot     History of cerebral venous sinus thrombosis associated with congenital heart disease     Pt denies any knowledge of this dx 2019- Pt denies ever having this dx    History of kidney stones     Hodgkin lymphoma     dx 2001- no recurrence- chemo and stem cell     Hyperlipidemia     Hypertension     medication    Neuropathy     OA (osteoarthritis)     knees    Obstructive sleep apnea (adult) (pediatric) 9/7/2013    uses CPAP    Prediabetes

## 2025-04-18 NOTE — TELEPHONE ENCOUNTER
Patient called in pain.  His MRI showed he does have a stone.  Per maegan I advised him to go to the ER

## 2025-04-18 NOTE — PROGRESS NOTES
4 Eyes Skin Assessment     NAME:  Zane Fitch  YOB: 1965  MEDICAL RECORD NUMBER:  [id number]    The patient is being assessed for  Admission    I agree that at least one RN has performed a thorough Head to Toe Skin Assessment on the patient. ALL assessment sites listed below have been assessed.      Areas assessed by both nurses:    Head, Face, Ears, Shoulders, Back, Chest, Arms, Elbows, Hands, Sacrum. Buttock, Coccyx, Ischium, Legs. Feet and Heels, and Under Medical Devices         Does the Patient have a Wound? No noted wound(s)       Kyree Prevention initiated by RN: Yes  Wound Care Orders initiated by RN: No    Pressure Injury (Stage 3,4, Unstageable, DTI, NWPT, and Complex wounds) if present, place Wound referral order by RN under : No    New Ostomies, if present place, Ostomy referral order under : No     Nurse 1 eSignature: Electronically signed by Margaret Mata RN on 4/18/25 at 4:22 PM EDT    **SHARE this note so that the co-signing nurse can place an eSignature**    Nurse 2 eSignature: Electronically signed by Luanne Ho RN on 4/18/25 at 6:19 PM EDT

## 2025-04-19 ENCOUNTER — APPOINTMENT (OUTPATIENT)
Dept: GENERAL RADIOLOGY | Age: 60
DRG: 660 | End: 2025-04-19
Payer: COMMERCIAL

## 2025-04-19 ENCOUNTER — ANESTHESIA (OUTPATIENT)
Dept: SURGERY | Age: 60
End: 2025-04-19
Payer: COMMERCIAL

## 2025-04-19 ENCOUNTER — ANESTHESIA EVENT (OUTPATIENT)
Dept: SURGERY | Age: 60
End: 2025-04-19
Payer: COMMERCIAL

## 2025-04-19 LAB
ANION GAP SERPL CALC-SCNC: 8 MMOL/L (ref 7–16)
BASOPHILS # BLD: 0.06 K/UL (ref 0–0.2)
BASOPHILS NFR BLD: 0.8 % (ref 0–2)
BUN SERPL-MCNC: 25 MG/DL (ref 6–23)
CALCIUM SERPL-MCNC: 8.8 MG/DL (ref 8.8–10.2)
CHLORIDE SERPL-SCNC: 107 MMOL/L (ref 98–107)
CO2 SERPL-SCNC: 23 MMOL/L (ref 20–29)
CREAT SERPL-MCNC: 1.75 MG/DL (ref 0.8–1.3)
DIFFERENTIAL METHOD BLD: NORMAL
EOSINOPHIL # BLD: 0.14 K/UL (ref 0–0.8)
EOSINOPHIL NFR BLD: 1.8 % (ref 0.5–7.8)
ERYTHROCYTE [DISTWIDTH] IN BLOOD BY AUTOMATED COUNT: 14.5 % (ref 11.9–14.6)
EST. AVERAGE GLUCOSE BLD GHB EST-MCNC: 117 MG/DL
GLUCOSE BLD STRIP.AUTO-MCNC: 105 MG/DL (ref 65–100)
GLUCOSE BLD STRIP.AUTO-MCNC: 125 MG/DL (ref 65–100)
GLUCOSE BLD STRIP.AUTO-MCNC: 96 MG/DL (ref 65–100)
GLUCOSE BLD STRIP.AUTO-MCNC: 98 MG/DL (ref 65–100)
GLUCOSE BLD STRIP.AUTO-MCNC: 99 MG/DL (ref 65–100)
GLUCOSE SERPL-MCNC: 113 MG/DL (ref 70–99)
HBA1C MFR BLD: 5.7 % (ref 0–5.6)
HCT VFR BLD AUTO: 44.3 % (ref 41.1–50.3)
HGB BLD-MCNC: 14.3 G/DL (ref 13.6–17.2)
IMM GRANULOCYTES # BLD AUTO: 0.04 K/UL (ref 0–0.5)
IMM GRANULOCYTES NFR BLD AUTO: 0.5 % (ref 0–5)
LYMPHOCYTES # BLD: 2.23 K/UL (ref 0.5–4.6)
LYMPHOCYTES NFR BLD: 28.8 % (ref 13–44)
MCH RBC QN AUTO: 29.1 PG (ref 26.1–32.9)
MCHC RBC AUTO-ENTMCNC: 32.3 G/DL (ref 31.4–35)
MCV RBC AUTO: 90.2 FL (ref 82–102)
MONOCYTES # BLD: 0.65 K/UL (ref 0.1–1.3)
MONOCYTES NFR BLD: 8.4 % (ref 4–12)
NEUTS SEG # BLD: 4.61 K/UL (ref 1.7–8.2)
NEUTS SEG NFR BLD: 59.7 % (ref 43–78)
NRBC # BLD: 0 K/UL (ref 0–0.2)
PLATELET # BLD AUTO: 317 K/UL (ref 150–450)
PMV BLD AUTO: 9.6 FL (ref 9.4–12.3)
POTASSIUM SERPL-SCNC: 4.5 MMOL/L (ref 3.5–5.1)
RBC # BLD AUTO: 4.91 M/UL (ref 4.23–5.6)
SERVICE CMNT-IMP: ABNORMAL
SERVICE CMNT-IMP: ABNORMAL
SERVICE CMNT-IMP: NORMAL
SODIUM SERPL-SCNC: 139 MMOL/L (ref 136–145)
WBC # BLD AUTO: 7.7 K/UL (ref 4.3–11.1)

## 2025-04-19 PROCEDURE — 2720000010 HC SURG SUPPLY STERILE: Performed by: UROLOGY

## 2025-04-19 PROCEDURE — 6360000002 HC RX W HCPCS: Performed by: ANESTHESIOLOGY

## 2025-04-19 PROCEDURE — 3700000000 HC ANESTHESIA ATTENDED CARE: Performed by: UROLOGY

## 2025-04-19 PROCEDURE — C1747 HC ENDOSCOPE, SINGLE, URINARY TRACT: HCPCS | Performed by: UROLOGY

## 2025-04-19 PROCEDURE — 3600000014 HC SURGERY LEVEL 4 ADDTL 15MIN: Performed by: UROLOGY

## 2025-04-19 PROCEDURE — 1100000000 HC RM PRIVATE

## 2025-04-19 PROCEDURE — 2580000003 HC RX 258: Performed by: NURSE PRACTITIONER

## 2025-04-19 PROCEDURE — 2580000003 HC RX 258: Performed by: ANESTHESIOLOGY

## 2025-04-19 PROCEDURE — 6370000000 HC RX 637 (ALT 250 FOR IP): Performed by: ANESTHESIOLOGY

## 2025-04-19 PROCEDURE — 6360000002 HC RX W HCPCS

## 2025-04-19 PROCEDURE — 85025 COMPLETE CBC W/AUTO DIFF WBC: CPT

## 2025-04-19 PROCEDURE — 82962 GLUCOSE BLOOD TEST: CPT

## 2025-04-19 PROCEDURE — 7100000001 HC PACU RECOVERY - ADDTL 15 MIN: Performed by: UROLOGY

## 2025-04-19 PROCEDURE — 2709999900 HC NON-CHARGEABLE SUPPLY: Performed by: UROLOGY

## 2025-04-19 PROCEDURE — 0TC68ZZ EXTIRPATION OF MATTER FROM RIGHT URETER, VIA NATURAL OR ARTIFICIAL OPENING ENDOSCOPIC: ICD-10-PCS | Performed by: UROLOGY

## 2025-04-19 PROCEDURE — C2617 STENT, NON-COR, TEM W/O DEL: HCPCS | Performed by: UROLOGY

## 2025-04-19 PROCEDURE — 2500000003 HC RX 250 WO HCPCS

## 2025-04-19 PROCEDURE — 3700000001 HC ADD 15 MINUTES (ANESTHESIA): Performed by: UROLOGY

## 2025-04-19 PROCEDURE — 0TC38ZZ EXTIRPATION OF MATTER FROM RIGHT KIDNEY PELVIS, VIA NATURAL OR ARTIFICIAL OPENING ENDOSCOPIC: ICD-10-PCS | Performed by: UROLOGY

## 2025-04-19 PROCEDURE — 52356 CYSTO/URETERO W/LITHOTRIPSY: CPT | Performed by: UROLOGY

## 2025-04-19 PROCEDURE — 80048 BASIC METABOLIC PNL TOTAL CA: CPT

## 2025-04-19 PROCEDURE — 99231 SBSQ HOSP IP/OBS SF/LOW 25: CPT | Performed by: NURSE PRACTITIONER

## 2025-04-19 PROCEDURE — 2500000003 HC RX 250 WO HCPCS: Performed by: INTERNAL MEDICINE

## 2025-04-19 PROCEDURE — 36415 COLL VENOUS BLD VENIPUNCTURE: CPT

## 2025-04-19 PROCEDURE — 7100000000 HC PACU RECOVERY - FIRST 15 MIN: Performed by: UROLOGY

## 2025-04-19 PROCEDURE — C1769 GUIDE WIRE: HCPCS | Performed by: UROLOGY

## 2025-04-19 PROCEDURE — 3600000004 HC SURGERY LEVEL 4 BASE: Performed by: UROLOGY

## 2025-04-19 PROCEDURE — C1894 INTRO/SHEATH, NON-LASER: HCPCS | Performed by: UROLOGY

## 2025-04-19 PROCEDURE — 6370000000 HC RX 637 (ALT 250 FOR IP): Performed by: NURSE PRACTITIONER

## 2025-04-19 PROCEDURE — 0T768DZ DILATION OF RIGHT URETER WITH INTRALUMINAL DEVICE, VIA NATURAL OR ARTIFICIAL OPENING ENDOSCOPIC: ICD-10-PCS | Performed by: UROLOGY

## 2025-04-19 PROCEDURE — 6360000002 HC RX W HCPCS: Performed by: UROLOGY

## 2025-04-19 PROCEDURE — 83036 HEMOGLOBIN GLYCOSYLATED A1C: CPT

## 2025-04-19 PROCEDURE — 6370000000 HC RX 637 (ALT 250 FOR IP): Performed by: INTERNAL MEDICINE

## 2025-04-19 DEVICE — URETERAL STENT WITH SIDE HOLES 7FX26CM
Type: IMPLANTABLE DEVICE | Site: URETER | Status: FUNCTIONAL
Brand: TRIA™ SOFT

## 2025-04-19 RX ORDER — DEXAMETHASONE SODIUM PHOSPHATE 4 MG/ML
INJECTION, SOLUTION INTRA-ARTICULAR; INTRALESIONAL; INTRAMUSCULAR; INTRAVENOUS; SOFT TISSUE
Status: DISCONTINUED | OUTPATIENT
Start: 2025-04-19 | End: 2025-04-19 | Stop reason: SDUPTHER

## 2025-04-19 RX ORDER — TAMSULOSIN HYDROCHLORIDE 0.4 MG/1
0.4 CAPSULE ORAL
Qty: 30 CAPSULE | Refills: 0 | Status: SHIPPED | OUTPATIENT
Start: 2025-04-19 | End: 2025-04-19

## 2025-04-19 RX ORDER — NALOXONE HYDROCHLORIDE 0.4 MG/ML
INJECTION, SOLUTION INTRAMUSCULAR; INTRAVENOUS; SUBCUTANEOUS PRN
Status: DISCONTINUED | OUTPATIENT
Start: 2025-04-19 | End: 2025-04-19 | Stop reason: HOSPADM

## 2025-04-19 RX ORDER — SODIUM CHLORIDE 0.9 % (FLUSH) 0.9 %
5-40 SYRINGE (ML) INJECTION PRN
Status: DISCONTINUED | OUTPATIENT
Start: 2025-04-19 | End: 2025-04-19 | Stop reason: HOSPADM

## 2025-04-19 RX ORDER — HYDROCODONE BITARTRATE AND ACETAMINOPHEN 5; 325 MG/1; MG/1
1 TABLET ORAL EVERY 8 HOURS PRN
Qty: 9 TABLET | Refills: 0 | Status: SHIPPED | OUTPATIENT
Start: 2025-04-19 | End: 2025-04-22

## 2025-04-19 RX ORDER — PHENYLEPHRINE HYDROCHLORIDE 10 MG/ML
INJECTION INTRAVENOUS
Status: DISCONTINUED | OUTPATIENT
Start: 2025-04-19 | End: 2025-04-19 | Stop reason: SDUPTHER

## 2025-04-19 RX ORDER — SODIUM CHLORIDE, SODIUM LACTATE, POTASSIUM CHLORIDE, CALCIUM CHLORIDE 600; 310; 30; 20 MG/100ML; MG/100ML; MG/100ML; MG/100ML
INJECTION, SOLUTION INTRAVENOUS CONTINUOUS
Status: DISCONTINUED | OUTPATIENT
Start: 2025-04-19 | End: 2025-04-19 | Stop reason: HOSPADM

## 2025-04-19 RX ORDER — TAMSULOSIN HYDROCHLORIDE 0.4 MG/1
0.4 CAPSULE ORAL
Qty: 30 CAPSULE | Refills: 0 | Status: SHIPPED | OUTPATIENT
Start: 2025-04-19 | End: 2025-04-20

## 2025-04-19 RX ORDER — ONDANSETRON 2 MG/ML
INJECTION INTRAMUSCULAR; INTRAVENOUS
Status: DISCONTINUED | OUTPATIENT
Start: 2025-04-19 | End: 2025-04-19 | Stop reason: SDUPTHER

## 2025-04-19 RX ORDER — HYDROCODONE BITARTRATE AND ACETAMINOPHEN 5; 325 MG/1; MG/1
1 TABLET ORAL EVERY 8 HOURS PRN
Qty: 9 TABLET | Refills: 0 | Status: SHIPPED | OUTPATIENT
Start: 2025-04-19 | End: 2025-04-19

## 2025-04-19 RX ORDER — ACETAMINOPHEN 500 MG
1000 TABLET ORAL ONCE
Status: COMPLETED | OUTPATIENT
Start: 2025-04-19 | End: 2025-04-19

## 2025-04-19 RX ORDER — OXYCODONE HYDROCHLORIDE 5 MG/1
5 TABLET ORAL
Status: COMPLETED | OUTPATIENT
Start: 2025-04-19 | End: 2025-04-19

## 2025-04-19 RX ORDER — HYDROCODONE BITARTRATE AND ACETAMINOPHEN 5; 325 MG/1; MG/1
1 TABLET ORAL EVERY 6 HOURS PRN
Qty: 12 TABLET | Refills: 0 | Status: SHIPPED | OUTPATIENT
Start: 2025-04-19 | End: 2025-04-20

## 2025-04-19 RX ORDER — PHENAZOPYRIDINE HYDROCHLORIDE 200 MG/1
200 TABLET, FILM COATED ORAL 3 TIMES DAILY PRN
Qty: 30 TABLET | Refills: 0 | Status: SHIPPED | OUTPATIENT
Start: 2025-04-19 | End: 2025-04-20

## 2025-04-19 RX ORDER — ROCURONIUM BROMIDE 10 MG/ML
INJECTION, SOLUTION INTRAVENOUS
Status: DISCONTINUED | OUTPATIENT
Start: 2025-04-19 | End: 2025-04-19 | Stop reason: SDUPTHER

## 2025-04-19 RX ORDER — DEXTROSE MONOHYDRATE 100 MG/ML
INJECTION, SOLUTION INTRAVENOUS CONTINUOUS PRN
Status: DISCONTINUED | OUTPATIENT
Start: 2025-04-19 | End: 2025-04-19 | Stop reason: HOSPADM

## 2025-04-19 RX ORDER — SODIUM CHLORIDE 9 MG/ML
INJECTION, SOLUTION INTRAVENOUS PRN
Status: DISCONTINUED | OUTPATIENT
Start: 2025-04-19 | End: 2025-04-19 | Stop reason: HOSPADM

## 2025-04-19 RX ORDER — HYOSCYAMINE SULFATE 0.12 MG/1
0.12 TABLET SUBLINGUAL
Status: COMPLETED | OUTPATIENT
Start: 2025-04-19 | End: 2025-04-19

## 2025-04-19 RX ORDER — LIDOCAINE HYDROCHLORIDE 10 MG/ML
1 INJECTION, SOLUTION INFILTRATION; PERINEURAL
Status: DISCONTINUED | OUTPATIENT
Start: 2025-04-19 | End: 2025-04-19 | Stop reason: HOSPADM

## 2025-04-19 RX ORDER — ONDANSETRON 4 MG/1
4 TABLET, ORALLY DISINTEGRATING ORAL EVERY 8 HOURS PRN
Qty: 20 TABLET | Refills: 0 | Status: SHIPPED | OUTPATIENT
Start: 2025-04-19 | End: 2025-04-19

## 2025-04-19 RX ORDER — NEOSTIGMINE METHYLSULFATE 1 MG/ML
INJECTION, SOLUTION INTRAVENOUS
Status: DISCONTINUED | OUTPATIENT
Start: 2025-04-19 | End: 2025-04-19 | Stop reason: SDUPTHER

## 2025-04-19 RX ORDER — LIDOCAINE HYDROCHLORIDE 20 MG/ML
INJECTION, SOLUTION EPIDURAL; INFILTRATION; INTRACAUDAL; PERINEURAL
Status: DISCONTINUED | OUTPATIENT
Start: 2025-04-19 | End: 2025-04-19 | Stop reason: SDUPTHER

## 2025-04-19 RX ORDER — GLYCOPYRROLATE 0.2 MG/ML
INJECTION INTRAMUSCULAR; INTRAVENOUS
Status: DISCONTINUED | OUTPATIENT
Start: 2025-04-19 | End: 2025-04-19 | Stop reason: SDUPTHER

## 2025-04-19 RX ORDER — MIDAZOLAM HYDROCHLORIDE 2 MG/2ML
2 INJECTION, SOLUTION INTRAMUSCULAR; INTRAVENOUS
Status: DISCONTINUED | OUTPATIENT
Start: 2025-04-19 | End: 2025-04-19 | Stop reason: HOSPADM

## 2025-04-19 RX ORDER — PROCHLORPERAZINE EDISYLATE 5 MG/ML
5 INJECTION INTRAMUSCULAR; INTRAVENOUS
Status: DISCONTINUED | OUTPATIENT
Start: 2025-04-19 | End: 2025-04-19 | Stop reason: HOSPADM

## 2025-04-19 RX ORDER — ONDANSETRON 4 MG/1
4 TABLET, ORALLY DISINTEGRATING ORAL EVERY 8 HOURS PRN
Qty: 20 TABLET | Refills: 0 | Status: SHIPPED | OUTPATIENT
Start: 2025-04-19 | End: 2025-04-20

## 2025-04-19 RX ORDER — SODIUM CHLORIDE 0.9 % (FLUSH) 0.9 %
5-40 SYRINGE (ML) INJECTION EVERY 12 HOURS SCHEDULED
Status: DISCONTINUED | OUTPATIENT
Start: 2025-04-19 | End: 2025-04-19 | Stop reason: HOSPADM

## 2025-04-19 RX ORDER — PROPOFOL 10 MG/ML
INJECTION, EMULSION INTRAVENOUS
Status: DISCONTINUED | OUTPATIENT
Start: 2025-04-19 | End: 2025-04-19 | Stop reason: SDUPTHER

## 2025-04-19 RX ORDER — DIPHENHYDRAMINE HYDROCHLORIDE 50 MG/ML
12.5 INJECTION, SOLUTION INTRAMUSCULAR; INTRAVENOUS
Status: DISCONTINUED | OUTPATIENT
Start: 2025-04-19 | End: 2025-04-19 | Stop reason: HOSPADM

## 2025-04-19 RX ORDER — IBUPROFEN 600 MG/1
1 TABLET ORAL PRN
Status: DISCONTINUED | OUTPATIENT
Start: 2025-04-19 | End: 2025-04-19 | Stop reason: HOSPADM

## 2025-04-19 RX ORDER — FENTANYL CITRATE 50 UG/ML
100 INJECTION, SOLUTION INTRAMUSCULAR; INTRAVENOUS
Status: DISCONTINUED | OUTPATIENT
Start: 2025-04-19 | End: 2025-04-19 | Stop reason: HOSPADM

## 2025-04-19 RX ADMIN — ATORVASTATIN CALCIUM 80 MG: 80 TABLET, FILM COATED ORAL at 20:15

## 2025-04-19 RX ADMIN — SODIUM CHLORIDE, SODIUM LACTATE, POTASSIUM CHLORIDE, AND CALCIUM CHLORIDE: 600; 310; 30; 20 INJECTION, SOLUTION INTRAVENOUS at 15:22

## 2025-04-19 RX ADMIN — HYOSCYAMINE SULFATE 0.12 MG: 0.12 TABLET ORAL; SUBLINGUAL at 16:35

## 2025-04-19 RX ADMIN — OXYCODONE 5 MG: 5 TABLET ORAL at 16:39

## 2025-04-19 RX ADMIN — Medication 5 MG: at 16:03

## 2025-04-19 RX ADMIN — EZETIMIBE 10 MG: 10 TABLET ORAL at 08:40

## 2025-04-19 RX ADMIN — DEXAMETHASONE SODIUM PHOSPHATE 4 MG: 4 INJECTION INTRA-ARTICULAR; INTRALESIONAL; INTRAMUSCULAR; INTRAVENOUS; SOFT TISSUE at 14:48

## 2025-04-19 RX ADMIN — PROPOFOL 300 MG: 10 INJECTION, EMULSION INTRAVENOUS at 14:45

## 2025-04-19 RX ADMIN — ATENOLOL 25 MG: 25 TABLET ORAL at 20:16

## 2025-04-19 RX ADMIN — GABAPENTIN 300 MG: 300 CAPSULE ORAL at 20:16

## 2025-04-19 RX ADMIN — AMITRIPTYLINE HYDROCHLORIDE 50 MG: 50 TABLET ORAL at 21:48

## 2025-04-19 RX ADMIN — SODIUM CHLORIDE, PRESERVATIVE FREE 10 ML: 5 INJECTION INTRAVENOUS at 08:50

## 2025-04-19 RX ADMIN — LIDOCAINE HYDROCHLORIDE 100 MG: 20 INJECTION, SOLUTION EPIDURAL; INFILTRATION; INTRACAUDAL; PERINEURAL at 14:45

## 2025-04-19 RX ADMIN — FENOFIBRATE 160 MG: 160 TABLET ORAL at 20:15

## 2025-04-19 RX ADMIN — ASPIRIN 81 MG: 81 TABLET ORAL at 20:15

## 2025-04-19 RX ADMIN — SODIUM CHLORIDE, PRESERVATIVE FREE 10 ML: 5 INJECTION INTRAVENOUS at 20:18

## 2025-04-19 RX ADMIN — HYDROCODONE BITARTRATE AND ACETAMINOPHEN 1 TABLET: 5; 325 TABLET ORAL at 21:45

## 2025-04-19 RX ADMIN — GLYCOPYRROLATE 0.4 MG: 0.2 INJECTION INTRAMUSCULAR; INTRAVENOUS at 16:03

## 2025-04-19 RX ADMIN — ROCURONIUM BROMIDE 40 MG: 10 INJECTION, SOLUTION INTRAVENOUS at 14:45

## 2025-04-19 RX ADMIN — ACETAMINOPHEN 1000 MG: 500 TABLET, FILM COATED ORAL at 13:07

## 2025-04-19 RX ADMIN — Medication 2000 MG: at 14:50

## 2025-04-19 RX ADMIN — MORPHINE SULFATE 2 MG: 2 INJECTION, SOLUTION INTRAMUSCULAR; INTRAVENOUS at 18:18

## 2025-04-19 RX ADMIN — EPHEDRINE SULFATE 10 MG: 5 INJECTION INTRAVENOUS at 15:05

## 2025-04-19 RX ADMIN — PHENYLEPHRINE HYDROCHLORIDE 200 MCG: 10 INJECTION INTRAVENOUS at 15:09

## 2025-04-19 RX ADMIN — PANTOPRAZOLE SODIUM 40 MG: 40 TABLET, DELAYED RELEASE ORAL at 05:34

## 2025-04-19 RX ADMIN — GLYCOPYRROLATE 0.2 MG: 0.2 INJECTION INTRAMUSCULAR; INTRAVENOUS at 15:06

## 2025-04-19 RX ADMIN — EPHEDRINE SULFATE 10 MG: 5 INJECTION INTRAVENOUS at 14:59

## 2025-04-19 RX ADMIN — SODIUM CHLORIDE, SODIUM LACTATE, POTASSIUM CHLORIDE, AND CALCIUM CHLORIDE: 600; 310; 30; 20 INJECTION, SOLUTION INTRAVENOUS at 13:07

## 2025-04-19 RX ADMIN — GLYCOPYRROLATE 0.2 MG: 0.2 INJECTION INTRAMUSCULAR; INTRAVENOUS at 15:09

## 2025-04-19 RX ADMIN — PHENYLEPHRINE HYDROCHLORIDE 200 MCG: 10 INJECTION INTRAVENOUS at 15:54

## 2025-04-19 RX ADMIN — ONDANSETRON 4 MG: 2 INJECTION INTRAMUSCULAR; INTRAVENOUS at 14:48

## 2025-04-19 RX ADMIN — ROCURONIUM BROMIDE 10 MG: 10 INJECTION, SOLUTION INTRAVENOUS at 15:32

## 2025-04-19 RX ADMIN — TAMSULOSIN HYDROCHLORIDE 0.4 MG: 0.4 CAPSULE ORAL at 20:15

## 2025-04-19 RX ADMIN — SODIUM CHLORIDE: 0.9 INJECTION, SOLUTION INTRAVENOUS at 08:46

## 2025-04-19 ASSESSMENT — PAIN DESCRIPTION - LOCATION
LOCATION: PELVIS
LOCATION: GROIN
LOCATION: PENIS

## 2025-04-19 ASSESSMENT — PAIN SCALES - GENERAL
PAINLEVEL_OUTOF10: 7
PAINLEVEL_OUTOF10: 3
PAINLEVEL_OUTOF10: 2
PAINLEVEL_OUTOF10: 6
PAINLEVEL_OUTOF10: 9
PAINLEVEL_OUTOF10: 2

## 2025-04-19 ASSESSMENT — PAIN - FUNCTIONAL ASSESSMENT
PAIN_FUNCTIONAL_ASSESSMENT: 0-10
PAIN_FUNCTIONAL_ASSESSMENT: ACTIVITIES ARE NOT PREVENTED

## 2025-04-19 ASSESSMENT — PAIN DESCRIPTION - DESCRIPTORS
DESCRIPTORS: ACHING;DISCOMFORT;TENDER
DESCRIPTORS: BURNING;CRAMPING

## 2025-04-19 NOTE — ANESTHESIA PROCEDURE NOTES
Airway  Date/Time: 4/19/2025 2:47 PM  Urgency: elective    Airway not difficult    General Information and Staff    Patient location during procedure: OR  Resident/CRNA: Ariella Peng APRN - CRNA  Performed: resident/CRNA   Performed by: Ariella Peng APRN - CRNA  Authorized by: Darren Guevara MD      Indications and Patient Condition  Indications for airway management: anesthesia  Spontaneous Ventilation: absent  Sedation level: deep  Preoxygenated: yes  Patient position: sniffing  MILS not maintained throughout  Mask difficulty assessment: vent by bag mask    Final Airway Details  Final airway type: endotracheal airway      Successful airway: ETT  Cuffed: yes   Successful intubation technique: video laryngoscopy  Facilitating devices/methods: intubating stylet  Endotracheal tube insertion site: oral  Blade: Hernandez  Blade size: #3  ETT size (mm): 7.5  Cormack-Lehane Classification: grade I - full view of glottis  Placement verified by: chest auscultation and capnometry   Cuff volume (mL): 8  Measured from: teeth  ETT to teeth (cm): 22  Number of attempts at approach: 1  Ventilation between attempts: bag mask  Number of other approaches attempted: 0    Additional Comments  Lips and dentition unchanged.  no

## 2025-04-19 NOTE — PROGRESS NOTES
[facility]: Office Visit Established Patient    Reason for follow up:    H/o HL, unintentional weight loss     Overview: (copied from prior)    4/3/25:The patient is a 59-year-old male with a history of Hodgkin lymphoma, nodular sclerosis subtype, stage IIb, treated with seven cycles of ABVD. Following treatment, an equivocal PET CT scan led to an autologous peripheral blood hematopoietic stem cell transplant in 12/2001. He was previously followed by Dr. Yost, with the last follow-up in 01/2022. No systemic imaging is available more recent than a CT abdomen and pelvis renal stone protocol from 03/2024. Recently, he was evaluated by his primary care physician for chronic medical problems including type 2 diabetes mellitus and hyperlipidemia. An acute kidney injury was noted with serum creatinine of 1.9 on 03/24/2025, up from 0.94 on 09/16/2024. Borderline elevated AST at 44 was noted, with otherwise unremarkable LFTs. CBC was unremarkable. Significant weight loss was reported, from 216 pounds in 06/2024 down to 186 pounds on 03/31/2025. Given his history of lymphoma, further evaluation was recommended. Recent labs included normal free T4 on 03/24/2025, hemoglobin A1c of 5.7, PSA of 1.7, and B12 of 339.     Unintentional weight loss has been experienced, despite no significant changes in diet or physical activity levels. A weight loss regimen was initiated last year due to perceived overweight status, incorporating sauerkraut, beets, and lemon water into his diet. Initial weight loss was followed by a reduction in food intake, yet weight continues to decrease. Current weight is approaching his weight from 40 years ago when he got , which was between 170 to 175 pounds. Difficulty swallowing large pills has been reported, necessitating increased water intake, a symptom persisting for the past 1 to 2 years. Difficulty swallowing large pieces of steak is also noted. Occasional heartburn is

## 2025-04-19 NOTE — PROGRESS NOTES
Pt's pain was not well controlled when he arrived back to room. NP was made aware of uncontrolled pain and that pt's  pharmacy had closed.  D/C canceled for tonight.  Rounding done during the shift, all needs met, wife at side.  Bed L/L with call bell in reach.  Report given to oncoming RN.

## 2025-04-19 NOTE — PROGRESS NOTES
Strained urine overnight, no stones noted. Hourly rounds completed, all needs met this shift. Bed in L/L with call light in reach. Report to be given to dayshift nurse.       NPO since midnight. CHG bath given.

## 2025-04-19 NOTE — PERIOP NOTE
TRANSFER - OUT REPORT:    Verbal report given to 6th floor RN on Zane Fitch  being transferred to [facility] for routine post-op       Report consisted of patient’s Situation, Background, Assessment and   Recommendations(SBAR).     Information from the following report(s) Nurse Handoff Report, Adult Overview, MAR, and Cardiac Rhythm SB  was reviewed with the receiving nurse.    Lines:   Peripheral IV 04/18/25 Left Forearm (Active)   Site Assessment Clean, dry & intact 04/19/25 1617   Line Status Normal saline locked 04/19/25 1617   Line Care Connections checked and tightened 04/19/25 1617   Phlebitis Assessment No symptoms 04/19/25 1617   Infiltration Assessment 0 04/19/25 1617   Alcohol Cap Used Yes 04/19/25 0716   Dressing Status Clean, dry & intact 04/19/25 1617   Dressing Type Transparent 04/19/25 1617       Peripheral IV 04/19/25 Left Antecubital (Active)   Site Assessment Clean, dry & intact 04/19/25 1617   Line Status Infusing 04/19/25 1617   Line Care Connections checked and tightened 04/19/25 1617   Phlebitis Assessment No symptoms 04/19/25 1617   Infiltration Assessment 0 04/19/25 1617   Alcohol Cap Used Yes 04/19/25 1300   Dressing Status Clean, dry & intact 04/19/25 1617   Dressing Type Transparent 04/19/25 1617        Opportunity for questions and clarification was provided.      Patient transported with:   O2 @ 2 liters    VTE prophylaxis orders have been written for Zane Fitch.    Patient and family given floor number and nurses name.  Family updated re: pt status after security code verified.

## 2025-04-19 NOTE — ANESTHESIA PRE PROCEDURE
flush 0.9 % injection 5-40 mL  5-40 mL IntraVENous 2 times per day Marla Tapia MD   10 mL at 04/19/25 0850    sodium chloride flush 0.9 % injection 5-40 mL  5-40 mL IntraVENous PRN Marla Tapia MD        0.9 % sodium chloride infusion   IntraVENous PRN Marla Tapia MD        ondansetron (ZOFRAN-ODT) disintegrating tablet 4 mg  4 mg Oral Q8H PRN Marla Tapia MD        Or    ondansetron (ZOFRAN) injection 4 mg  4 mg IntraVENous Q6H PRN Marla Tapia MD        polyethylene glycol (GLYCOLAX) packet 17 g  17 g Oral Daily PRN Marla Tapia MD        acetaminophen (TYLENOL) tablet 650 mg  650 mg Oral Q6H PRN Marla Tapia MD        Or    acetaminophen (TYLENOL) suppository 650 mg  650 mg Rectal Q6H PRN Marla Tapia MD        0.9 % sodium chloride infusion   IntraVENous Continuous Lisa Lea NP-C 125 mL/hr at 04/19/25 0846 New Bag at 04/19/25 0846    glucose chewable tablet 16 g  4 tablet Oral PRN Marla Tapia MD        dextrose bolus 10% 125 mL  125 mL IntraVENous PRN Marla Tapia MD        Or    dextrose bolus 10% 250 mL  250 mL IntraVENous PRN Marla Tapia MD        Glucagon Emergency KIT 1 mg  1 mg SubCUTAneous PRN Marla Tapia MD        dextrose 10 % infusion   IntraVENous Continuous PRN Marla Tapia MD        insulin lispro (HUMALOG,ADMELOG) injection vial 0-4 Units  0-4 Units SubCUTAneous 4 times per day Marla Tapia MD        amitriptyline (ELAVIL) tablet 50 mg  50 mg Oral Nightly PRN Marla Tapia MD        aspirin EC tablet 81 mg  81 mg Oral Nightly Marla Tapia MD        atenolol (TENORMIN) tablet 25 mg  25 mg Oral Nightly Marla Tapia MD   25 mg at 04/18/25 2033    atorvastatin (LIPITOR) tablet 80 mg  80 mg Oral Nightly Marla Tapia MD   80 mg at 04/18/25 2033    ezetimibe (ZETIA) tablet 10 mg  10 mg Oral Daily Marla Tapia MD   10 mg at 04/19/25 0840

## 2025-04-19 NOTE — PLAN OF CARE
Problem: Chronic Conditions and Co-morbidities  Goal: Patient's chronic conditions and co-morbidity symptoms are monitored and maintained or improved  Outcome: Progressing  Flowsheets (Taken 4/18/2025 1918)  Care Plan - Patient's Chronic Conditions and Co-Morbidity Symptoms are Monitored and Maintained or Improved:   Monitor and assess patient's chronic conditions and comorbid symptoms for stability, deterioration, or improvement   Collaborate with multidisciplinary team to address chronic and comorbid conditions and prevent exacerbation or deterioration   Update acute care plan with appropriate goals if chronic or comorbid symptoms are exacerbated and prevent overall improvement and discharge     Problem: Discharge Planning  Goal: Discharge to home or other facility with appropriate resources  Outcome: Progressing  Flowsheets  Taken 4/19/2025 0023  Discharge to home or other facility with appropriate resources: Identify barriers to discharge with patient and caregiver  Taken 4/18/2025 1918  Discharge to home or other facility with appropriate resources:   Identify barriers to discharge with patient and caregiver   Arrange for needed discharge resources and transportation as appropriate   Identify discharge learning needs (meds, wound care, etc)     Problem: Safety - Adult  Goal: Free from fall injury  Outcome: Progressing  Flowsheets (Taken 4/19/2025 0023)  Free From Fall Injury: Instruct family/caregiver on patient safety     Problem: Pain  Goal: Verbalizes/displays adequate comfort level or baseline comfort level  Outcome: Progressing  Flowsheets (Taken 4/19/2025 0023)  Verbalizes/displays adequate comfort level or baseline comfort level:   Encourage patient to monitor pain and request assistance   Assess pain using appropriate pain scale   Administer analgesics based on type and severity of pain and evaluate response   Implement non-pharmacological measures as appropriate and evaluate response

## 2025-04-19 NOTE — PROGRESS NOTES
Admit Date: 4/18/2025      Subjective:     Zane Fitch is resting in bed. No new complaints.  Objective:     Patient Vitals for the past 8 hrs:   BP Temp Temp src Pulse Resp SpO2   04/19/25 0704 (!) 147/95 98.1 °F (36.7 °C) Oral 51 16 100 %   04/19/25 0258 (!) 142/84 97.7 °F (36.5 °C) Oral 58 16 99 %     No intake/output data recorded.  04/17 1901 - 04/19 0700  In: -   Out: 2050 [Urine:2050]    Physical Exam:  GENERAL ASSESSMENT: alert, oriented to person, place and time, no acute distress and no anxiety, depression or agitation  Chest: normal work of breathing  CVS exam: normal rate, regular rhythm, normal S1, S2, no murmurs, rubs, clicks or gallops.  ABDOMEN: not done  Neurological exam reveals alert, oriented, normal speech, no focal findings or movement disorder noted.  FEMALE GENITOURINARY EXAM: not done  MALE GENITAL EXAM: not done    Data Review   Recent Results (from the past 24 hours)   CBC with Auto Differential    Collection Time: 04/18/25  2:02 PM   Result Value Ref Range    WBC 8.0 4.3 - 11.1 K/uL    RBC 4.82 4.23 - 5.6 M/uL    Hemoglobin 14.3 13.6 - 17.2 g/dL    Hematocrit 41.6 41.1 - 50.3 %    MCV 86.3 82 - 102 FL    MCH 29.7 26.1 - 32.9 PG    MCHC 34.4 31.4 - 35.0 g/dL    RDW 14.5 11.9 - 14.6 %    Platelets 343 150 - 450 K/uL    MPV 9.6 9.4 - 12.3 FL    nRBC 0.00 0.0 - 0.2 K/uL    Differential Type AUTOMATED      Neutrophils % 62.8 43.0 - 78.0 %    Lymphocytes % 26.8 13.0 - 44.0 %    Monocytes % 7.4 4.0 - 12.0 %    Eosinophils % 1.7 0.5 - 7.8 %    Basophils % 0.9 0.0 - 2.0 %    Immature Granulocytes % 0.4 0.0 - 5.0 %    Neutrophils Absolute 5.04 1.70 - 8.20 K/UL    Lymphocytes Absolute 2.15 0.50 - 4.60 K/UL    Monocytes Absolute 0.59 0.10 - 1.30 K/UL    Eosinophils Absolute 0.14 0.00 - 0.80 K/UL    Basophils Absolute 0.07 0.00 - 0.20 K/UL    Immature Granulocytes Absolute 0.03 0.0 - 0.5 K/UL   CMP    Collection Time: 04/18/25  2:02 PM   Result Value Ref Range    Sodium 139 136 - 145 mmol/L

## 2025-04-19 NOTE — PERIOP NOTE
TRANSFER - IN REPORT:    Verbal report received from ROBERTO CARLOS Robles on Zane Fitch being received from 601 for ordered procedure      Report consisted of patient’s Situation, Background, Assessment and Recommendations(SBAR).     Information from the following report(s) SBAR, Kardex, ED Summary, Intake/Output, MAR, Recent Results, Med Rec Status, Procedure Verification, and Quality Measures was reviewed with the receiving nurse.    Opportunity for questions and clarification was provided.      Assessment completed upon patient’s arrival to unit and care assumed.

## 2025-04-19 NOTE — FLOWSHEET NOTE
Case Management Assessment  Initial Evaluation    Date/Time of Evaluation: 4/19/2025 12:37 PM  Assessment Completed by: Demetrio Mao    If patient is discharged prior to next notation, then this note serves as note for discharge by case management.    Patient Name: Zane Fitch                   YOB: 1965  Diagnosis: Nephrolithiasis [N20.0]                   Date / Time: 4/18/2025  1:44 PM    Patient Admission Status: Inpatient   Readmission Risk (Low < 19, Mod (19-27), High > 27): Readmission Risk Score: 11.6    Current PCP: Shima Garber, APRN - CNP  PCP verified by CM? (P) Yes    Chart Reviewed: Yes      History Provided by: (P) Patient  Patient Orientation: (P) Alert and Oriented    Patient Cognition: (P) Alert    Hospitalization in the last 30 days (Readmission):  No    If yes, Readmission Assessment in CM Navigator will be completed.    Advance Directives:      Code Status: Full Code   Patient's Primary Decision Maker is: (P) Legal Next of Kin    Primary Decision Maker: Vidya Fitch - Ana - [phone]    Discharge Planning:    Patient lives with: (P) Spouse/Significant Other Type of Home: (P) House  Primary Care Giver: (P) Self  Patient Support Systems include: (P) Spouse/Significant Other   Current Financial resources:    Current community resources:    Current services prior to admission: (P) None            Current DME:              Type of Home Care services:  (P) None    ADLS  Prior functional level: (P) Independent in ADLs/IADLs  Current functional level: (P) Independent in ADLs/IADLs    PT AM-PAC:   /24  OT AM-PAC:   /24    Family can provide assistance at DC: (P) Yes  Would you like Case Management to discuss the discharge plan with any other family members/significant others, and if so, who? (P) Yes (Emergency contact - Vidya Fitch \"Spouse\")  Plans to Return to Present Housing: (P) Yes  Other Identified Issues/Barriers to RETURNING to current housing: none  Potential

## 2025-04-19 NOTE — ANESTHESIA POSTPROCEDURE EVALUATION
Department of Anesthesiology  Postprocedure Note    Patient: Zane Fitch  MRN: [id number]  YOB: 1965  Date of evaluation: 4/19/2025    Procedure Summary       Date: 04/19/25 Room / Location: [facility] MAIN OR 02 / [facility] MAIN OR    Anesthesia Start: 1436 Anesthesia Stop: 1618    Procedure: CYSTOSCOPY, RIGHT URETEROSCOPY, LASER LITHO, RIGHT STENT (Right: Ureter) Diagnosis:       Kidney calculi      (Kidney calculi [N20.0])    Providers: River Valadez Jr., MD Responsible Provider: Darren Guevara MD    Anesthesia Type: general ASA Status: 2 - Emergent            Anesthesia Type: No value filed.    Edgar Phase I: Edgar Score: 10    Edgar Phase II:      Anesthesia Post Evaluation    Patient location during evaluation: PACU  Patient participation: complete - patient participated  Level of consciousness: awake and alert  Airway patency: patent  Nausea: well controlled.  Cardiovascular status: acceptable.  Respiratory status: acceptable  Hydration status: stable  Pain management: adequate    No notable events documented.

## 2025-04-19 NOTE — PROGRESS NOTES
TRANSFER - IN REPORT:    Verbal report received from ROBERTO CARLOS Mercedes on Zane Fitch  being received from PACU for routine post-op      Report consisted of patient's Situation, Background, Assessment and   Recommendations(SBAR).     Information from the following report(s) Surgery Report was reviewed with the receiving nurse.    Opportunity for questions and clarification was provided.      Assessment completed upon patient's arrival to unit and care assumed.

## 2025-04-19 NOTE — BRIEF OP NOTE
Brief Postoperative Note      Patient: Zane Fitch  YOB: 1965  MRN: [id number]    Date of Procedure: 4/19/2025    Pre-Op Diagnosis Codes:      * Kidney calculi [N20.0]    Post-Op Diagnosis: Same       Procedure(s):  CYSTOSCOPY, RIGHT URETEROSCOPY, LASER LITHO, RIGHT STENT    Surgeon(s):  Dee Valadez Jr., MD    Assistant:  * No surgical staff found *    Anesthesia: General    Estimated Blood Loss (mL): Minimal    Complications: None    Specimens:   * No specimens in log *    Implants:  Implant Name Type Inv. Item Serial No.  Lot No. LRB No. Used Action   STENT URETERAL 7 FRX26 CM SOFT MONOFILAMENT TRIA - [id number]  STENT URETERAL 7 FRX26 CM SOFT MONOFILAMENT TRIA  [employer] [facility]-WD [id number] Right 1 Implanted         Drains: * No LDAs found *    Findings:  Infection Present At Time Of Surgery (PATOS) (choose all levels that have infection present):  No infection present  Other Findings: see op note    Electronically signed by DEE VALADEZ JR, MD on 4/19/2025 at 4:11 PM

## 2025-04-20 VITALS
OXYGEN SATURATION: 92 % | SYSTOLIC BLOOD PRESSURE: 107 MMHG | BODY MASS INDEX: 24.37 KG/M2 | RESPIRATION RATE: 18 BRPM | DIASTOLIC BLOOD PRESSURE: 69 MMHG | TEMPERATURE: 97.7 F | HEIGHT: 70 IN | HEART RATE: 60 BPM | WEIGHT: 170.19 LBS

## 2025-04-20 LAB
ANION GAP SERPL CALC-SCNC: 11 MMOL/L (ref 7–16)
BACTERIA SPEC CULT: NORMAL
BASOPHILS # BLD: 0.04 K/UL (ref 0–0.2)
BASOPHILS NFR BLD: 0.3 % (ref 0–2)
BUN SERPL-MCNC: 27 MG/DL (ref 6–23)
CALCIUM SERPL-MCNC: 8.9 MG/DL (ref 8.8–10.2)
CHLORIDE SERPL-SCNC: 106 MMOL/L (ref 98–107)
CO2 SERPL-SCNC: 22 MMOL/L (ref 20–29)
CREAT SERPL-MCNC: 1.93 MG/DL (ref 0.8–1.3)
DIFFERENTIAL METHOD BLD: ABNORMAL
EOSINOPHIL # BLD: 0 K/UL (ref 0–0.8)
EOSINOPHIL NFR BLD: 0 % (ref 0.5–7.8)
ERYTHROCYTE [DISTWIDTH] IN BLOOD BY AUTOMATED COUNT: 14.6 % (ref 11.9–14.6)
GLUCOSE BLD STRIP.AUTO-MCNC: 102 MG/DL (ref 65–100)
GLUCOSE BLD STRIP.AUTO-MCNC: 227 MG/DL (ref 65–100)
GLUCOSE SERPL-MCNC: 146 MG/DL (ref 70–99)
HCT VFR BLD AUTO: 43.6 % (ref 41.1–50.3)
HGB BLD-MCNC: 14.4 G/DL (ref 13.6–17.2)
IMM GRANULOCYTES # BLD AUTO: 0.06 K/UL (ref 0–0.5)
IMM GRANULOCYTES NFR BLD AUTO: 0.5 % (ref 0–5)
LYMPHOCYTES # BLD: 1.72 K/UL (ref 0.5–4.6)
LYMPHOCYTES NFR BLD: 14 % (ref 13–44)
MCH RBC QN AUTO: 29.2 PG (ref 26.1–32.9)
MCHC RBC AUTO-ENTMCNC: 33 G/DL (ref 31.4–35)
MCV RBC AUTO: 88.4 FL (ref 82–102)
MONOCYTES # BLD: 0.75 K/UL (ref 0.1–1.3)
MONOCYTES NFR BLD: 6.1 % (ref 4–12)
NEUTS SEG # BLD: 9.75 K/UL (ref 1.7–8.2)
NEUTS SEG NFR BLD: 79.1 % (ref 43–78)
NRBC # BLD: 0 K/UL (ref 0–0.2)
PLATELET # BLD AUTO: 346 K/UL (ref 150–450)
PMV BLD AUTO: 9.7 FL (ref 9.4–12.3)
POTASSIUM SERPL-SCNC: 4.7 MMOL/L (ref 3.5–5.1)
RBC # BLD AUTO: 4.93 M/UL (ref 4.23–5.6)
SERVICE CMNT-IMP: ABNORMAL
SERVICE CMNT-IMP: ABNORMAL
SERVICE CMNT-IMP: NORMAL
SODIUM SERPL-SCNC: 138 MMOL/L (ref 136–145)
WBC # BLD AUTO: 12.3 K/UL (ref 4.3–11.1)

## 2025-04-20 PROCEDURE — 85025 COMPLETE CBC W/AUTO DIFF WBC: CPT

## 2025-04-20 PROCEDURE — 36415 COLL VENOUS BLD VENIPUNCTURE: CPT

## 2025-04-20 PROCEDURE — 2500000003 HC RX 250 WO HCPCS: Performed by: INTERNAL MEDICINE

## 2025-04-20 PROCEDURE — 6370000000 HC RX 637 (ALT 250 FOR IP): Performed by: INTERNAL MEDICINE

## 2025-04-20 PROCEDURE — 99232 SBSQ HOSP IP/OBS MODERATE 35: CPT | Performed by: PHYSICIAN ASSISTANT

## 2025-04-20 PROCEDURE — 80048 BASIC METABOLIC PNL TOTAL CA: CPT

## 2025-04-20 RX ORDER — HYOSCYAMINE SULFATE 0.12 MG/1
0.12 TABLET SUBLINGUAL EVERY 6 HOURS PRN
Qty: 30 TABLET | Refills: 0 | Status: SHIPPED | OUTPATIENT
Start: 2025-04-20

## 2025-04-20 RX ORDER — HYDROCODONE BITARTRATE AND ACETAMINOPHEN 5; 325 MG/1; MG/1
1 TABLET ORAL EVERY 6 HOURS PRN
Qty: 12 TABLET | Refills: 0 | Status: SHIPPED | OUTPATIENT
Start: 2025-04-20 | End: 2025-04-23

## 2025-04-20 RX ORDER — PHENAZOPYRIDINE HYDROCHLORIDE 200 MG/1
200 TABLET, FILM COATED ORAL 3 TIMES DAILY PRN
Qty: 30 TABLET | Refills: 0 | Status: SHIPPED | OUTPATIENT
Start: 2025-04-20 | End: 2025-04-30

## 2025-04-20 RX ORDER — ONDANSETRON 4 MG/1
4 TABLET, ORALLY DISINTEGRATING ORAL EVERY 8 HOURS PRN
Qty: 20 TABLET | Refills: 0 | Status: SHIPPED | OUTPATIENT
Start: 2025-04-20

## 2025-04-20 RX ORDER — TAMSULOSIN HYDROCHLORIDE 0.4 MG/1
0.4 CAPSULE ORAL
Qty: 30 CAPSULE | Refills: 0 | Status: SHIPPED | OUTPATIENT
Start: 2025-04-20

## 2025-04-20 RX ORDER — HYOSCYAMINE SULFATE 0.12 MG/1
0.12 TABLET SUBLINGUAL EVERY 4 HOURS PRN
Status: DISCONTINUED | OUTPATIENT
Start: 2025-04-20 | End: 2025-04-20 | Stop reason: HOSPADM

## 2025-04-20 RX ORDER — HYOSCYAMINE SULFATE 0.12 MG/1
0.12 TABLET SUBLINGUAL EVERY 4 HOURS PRN
Qty: 90 TABLET | Refills: 3 | Status: SHIPPED | OUTPATIENT
Start: 2025-04-20 | End: 2025-04-20

## 2025-04-20 RX ADMIN — INSULIN LISPRO 1 UNITS: 100 INJECTION, SOLUTION INTRAVENOUS; SUBCUTANEOUS at 00:06

## 2025-04-20 RX ADMIN — HYDROCODONE BITARTRATE AND ACETAMINOPHEN 1 TABLET: 5; 325 TABLET ORAL at 06:04

## 2025-04-20 RX ADMIN — MORPHINE SULFATE 2 MG: 2 INJECTION, SOLUTION INTRAMUSCULAR; INTRAVENOUS at 00:00

## 2025-04-20 RX ADMIN — PANTOPRAZOLE SODIUM 40 MG: 40 TABLET, DELAYED RELEASE ORAL at 06:05

## 2025-04-20 RX ADMIN — HYOSCYAMINE SULFATE 0.12 MG: 0.12 TABLET ORAL; SUBLINGUAL at 08:50

## 2025-04-20 RX ADMIN — EZETIMIBE 10 MG: 10 TABLET ORAL at 08:50

## 2025-04-20 ASSESSMENT — PAIN DESCRIPTION - ORIENTATION: ORIENTATION: LOWER

## 2025-04-20 ASSESSMENT — PAIN SCALES - GENERAL
PAINLEVEL_OUTOF10: 9
PAINLEVEL_OUTOF10: 2
PAINLEVEL_OUTOF10: 4
PAINLEVEL_OUTOF10: 0
PAINLEVEL_OUTOF10: 4

## 2025-04-20 ASSESSMENT — PAIN - FUNCTIONAL ASSESSMENT
PAIN_FUNCTIONAL_ASSESSMENT: ACTIVITIES ARE NOT PREVENTED
PAIN_FUNCTIONAL_ASSESSMENT: ACTIVITIES ARE NOT PREVENTED

## 2025-04-20 ASSESSMENT — PAIN DESCRIPTION - DESCRIPTORS
DESCRIPTORS: ACHING;DISCOMFORT;TENDER
DESCRIPTORS: ACHING;DISCOMFORT;TENDER;CRAMPING
DESCRIPTORS: SPASM

## 2025-04-20 ASSESSMENT — PAIN DESCRIPTION - LOCATION
LOCATION: PELVIS;ABDOMEN
LOCATION: ABDOMEN;PELVIS

## 2025-04-20 NOTE — PROGRESS NOTES
Pt's D/C instructions completed.  Verbalized understanding of all instructions including diet, activity, s/sx to alert MD, medications, wound care, and f/u appointment.  Family at BS.

## 2025-04-20 NOTE — CARE COORDINATION
Pt is for discharge home today with family and no other needs/supportive care orders recieved for MSW at this time.

## 2025-04-20 NOTE — PROGRESS NOTES
Admit Date: 4/18/2025    Subjective:     Patient has no new complaints. States he is feeling much better today. Levsin helps with bladder spasms.    Objective:     Patient Vitals for the past 8 hrs:   BP Temp Temp src Pulse Resp SpO2 Weight   04/20/25 0730 107/69 97.7 °F (36.5 °C) Oral 60 18 92 % --   04/20/25 0634 -- -- -- -- 18 -- --   04/20/25 0604 -- -- -- -- 18 -- --   04/20/25 0315 (!) 142/83 97.5 °F (36.4 °C) Oral 60 16 100 % 77.2 kg (170 lb 3.1 oz)     No intake/output data recorded.  04/18 1901 - 04/20 0700  In: 1000 [I.V.:1000]  Out: 2150 [Urine:2150]    Physical Exam:  GENERAL ASSESSMENT: alert, oriented to person, place and time, no acute distress and no anxiety, depression or agitation  Chest: Easy work of breathing  CVS exam: RRR  ABDOMEN: not done  Neurological exam reveals alert, oriented, normal speech, no focal findings or movement disorder noted.  FEMALE GENITOURINARY EXAM: not done  MALE GENITAL EXAM: not done        Data Review   Recent Results (from the past 24 hours)   POCT Glucose    Collection Time: 04/19/25 11:24 AM   Result Value Ref Range    POC Glucose 96 65 - 100 mg/dL    Performed by: CristobalaPCT    POCT Glucose    Collection Time: 04/19/25  8:14 PM   Result Value Ref Range    POC Glucose 125 (H) 65 - 100 mg/dL    Performed by: MiguelPCDOREEN    POCT Glucose    Collection Time: 04/19/25 11:55 PM   Result Value Ref Range    POC Glucose 227 (H) 65 - 100 mg/dL    Performed by: NorbertoLion BiotechnologiesgloriaPCT    POCT Glucose    Collection Time: 04/20/25  6:08 AM   Result Value Ref Range    POC Glucose 102 (H) 65 - 100 mg/dL    Performed by: Harvey    CBC with Auto Differential    Collection Time: 04/20/25  8:59 AM   Result Value Ref Range    WBC 12.3 (H) 4.3 - 11.1 K/uL    RBC 4.93 4.23 - 5.6 M/uL    Hemoglobin 14.4 13.6 - 17.2 g/dL    Hematocrit 43.6 41.1 - 50.3 %    MCV 88.4 82 - 102 FL    MCH 29.2 26.1 - 32.9 PG    MCHC 33.0 31.4 - 35.0 g/dL    RDW 14.6 11.9 - 14.6 %

## 2025-04-20 NOTE — DISCHARGE SUMMARY
Hospitalist Discharge Summary   Admit Date:  2025  1:44 PM   DC Note date: 2025  Name:  Zane Fitch   Age:  59 y.o.  Sex:  male  :  1965   MRN:  [id number]   Room:  [facility]  PCP:  Shima Garber APRN - CNP    Presenting Complaint: Nephrolithiasis     Initial Admission Diagnosis: Nephrolithiasis [N20.0]     Problem List for this Hospitalization (present on admission):    Principal Problem:    Nephrolithiasis  Active Problems:    Type 2 diabetes mellitus    Hypertension    Hydronephrosis    Hydroureter    ERIN (acute kidney injury)    Hodgkin's lymphoma (HCC)    Elevated liver enzymes  Resolved Problems:    * No resolved hospital problems. *    Zane Fitch is a 59 y.o. male with medical history of :        -hodgkins lymphoma followed by oncology  -DM2  -HTN  -elevated LFTS  -ERIN/CKD  -weight loss   -nephrolithiasis, prior stents and nephrostomy      Sent to the ED for workup of abnormal CT scan Chest/ AP showing\"     No evidence of lymphoma.     Obstructing 10 mm calculus in the distal right ureter with moderate upstream  hydronephrosis. Additional nonobstructing bilateral renal calculi measuring up  to 13 mm within the right kidney.     Mildly enlarged prostate. Correlate with PSA.     Sigmoid diverticulosis.\"        Denies pain     Creatinine 2.06 and has been elevated on recent trends   Has outpatient referral to nephrology by PCP      UA negative         FULL CODE  Wife Vidya present [phone]    Interval History (): patient examined at bedside. No acute overnight events. No chest pain or shortness of breath. Pain is currently tolerable.     Hospital Course:  Patient admitted for obstructive uropathy. Started on IVF resuscitation and supportive measures. Urology consulted and patient went to the OR for stone removal. He is otherwise hemodynamically stable for hospital discharge. Sent scripts for zofran, pain meds, and Flomax to his pharmacy. Urinalysis without signs of 
1706 97.3 °F (36.3 °C) 52 18 (!) 162/88 100 %   04/19/25 1655 -- (!) 48 16 (!) 149/90 99 %   04/19/25 1650 97.9 °F (36.6 °C) 51 16 (!) 143/84 100 %   04/19/25 1645 -- (!) 49 16 (!) 141/85 99 %   04/19/25 1639 -- 54 15 (!) 143/85 100 %   04/19/25 1634 -- 56 16 (!) 147/86 95 %   04/19/25 1629 -- 59 16 127/81 99 %   04/19/25 1624 -- 66 15 132/77 100 %   04/19/25 1619 -- 74 15 (!) 149/81 100 %   04/19/25 1617 98.5 °F (36.9 °C) 78 16 (!) 148/84 100 %   04/19/25 1258 98 °F (36.7 °C) 57 18 137/76 92 %       Oxygen Therapy  SpO2: 92 %  Pulse via Oximetry: 48 beats per minute  Pulse Oximeter Device Mode: Other (Comment)  Pulse Oximeter Device Location: Left, Finger  O2 Device: None (Room air)    Estimated body mass index is 24.42 kg/m² as calculated from the following:    Height as of this encounter: 1.778 m (5' 10\").    Weight as of this encounter: 77.2 kg (170 lb 3.1 oz).    Intake/Output Summary (Last 24 hours) at 4/20/2025 0959  Last data filed at 4/19/2025 1902  Gross per 24 hour   Intake 1000 ml   Output 300 ml   Net 700 ml         Physical Exam:    General:    Well nourished.  No overt distress  Head:  Normocephalic, atraumatic  Eyes:  Sclerae appear normal.  Pupils equally round.    HENT:  Nares appear normal, no drainage.  Moist mucous membranes  Neck:  No restricted ROM.  Trachea midline  CV:   RRR.   No JVD  Lungs:   CTAB.  No wheezing, rhonchi, or rales.  Respirations even, unlabored  Abdomen:   Soft, nontender, nondistended.    Extremities: Warm and dry.   No edema.    Skin:     No rashes.  Normal coloration  Neuro:  CN II-XII grossly intact.  Psych:  Normal mood and affect.        Time spent in patient discharge and coordination 36 minutes.      Signed:  NANDINI MOLINA DO    Part of this note may have been written by using a voice dictation software.  The note has been proof read but may still contain some grammatical/other typographical errors.

## 2025-04-20 NOTE — PROGRESS NOTES
[facility]  [address] [place] [address]  Phone: [phone]             April 20, 2025    Patient: Zane Fitch   YOB: 1965   Date of Visit: 4/18/2025       To Whom It May Concern:    Zane Fitch was seen and treated in our facility  beginning 4/18/2025 until . He needs to be on light duty until 4/26/25.      Sincerely,       Margaret Mata RN         Signature:__________________________________

## 2025-04-20 NOTE — PROGRESS NOTES
Pain treated per MAR and is effective. Hourly rounds completed, all needs met this shift. Bed in L/L with call light in reach. Report to be given to dayshift nurse.

## 2025-04-20 NOTE — OP NOTE
[address]  [address]                            OPERATIVE REPORT      PATIENT NAME: KERON GARCIA        : 1965  MED REC NO: [id number]                       ROOM: 601  ACCOUNT NO: [id number]                       ADMIT DATE: 2025  PROVIDER: River Valadez Jr, MD    DATE OF SERVICE:  2025    PREOPERATIVE DIAGNOSES:       1. Right ureteral calculus.     2. Right renal calculus.    POSTOPERATIVE DIAGNOSES:       1. Right ureteral calculus.     2. Right renal calculus.    PROCEDURES PERFORMED:  Right ureteral pyeloscopy, holmium laser lithotripsy of a right ureteral stone and a right renal stone.    SURGEON:  River Valadez Jr, MD    ASSISTANT:  None.    ANESTHESIA:  General.    ESTIMATED BLOOD LOSS:  None.    SPECIMENS REMOVED:  None.    INTRAOPERATIVE FINDINGS:  A 10 mm stone in the right distal ureter, which was impacted and also a 13 mm stone in the right inferior calyx.  Both stones were treated, which resulted in the procedure lasting 2 times as long as normal because we treated 2 separate large stones.     COMPLICATIONS:  None.    As indicated, the procedure took about 2 times as long as normal because of the stone burden.    IMPLANTS:  Right ureteral stent.    INDICATIONS:  The patient admitted with elevated creatinine and findings of a stone in the right ureter and also one in the right kidney.    DESCRIPTION OF PROCEDURE:  The patient was given a general anesthetic, placed in the dorsal lithotomy position.  He was prepped and draped in a sterile fashion.  I passed a 22-Stateless cystoscope into the urethra using video camera and 30-degree lens.  The anterior urethra was normal.  Prostate shows moderate BPH.  Bladder was entered.  There were no mucosal lesions.  I passed a 0.038 floppy-tipped guidewire up the right ureter.  It met resistance in the right distal ureter due to the stone, but I was able to pass

## 2025-04-24 ENCOUNTER — HOSPITAL ENCOUNTER (OUTPATIENT)
Dept: LAB | Age: 60
Discharge: HOME | End: 2025-04-24
Payer: COMMERCIAL

## 2025-04-24 ENCOUNTER — OFFICE VISIT (OUTPATIENT)
Dept: ONCOLOGY | Age: 60
End: 2025-04-24
Payer: COMMERCIAL

## 2025-04-24 VITALS
HEIGHT: 70 IN | WEIGHT: 181.8 LBS | OXYGEN SATURATION: 99 % | RESPIRATION RATE: 16 BRPM | DIASTOLIC BLOOD PRESSURE: 88 MMHG | HEART RATE: 61 BPM | TEMPERATURE: 97.9 F | BODY MASS INDEX: 26.03 KG/M2 | SYSTOLIC BLOOD PRESSURE: 137 MMHG

## 2025-04-24 DIAGNOSIS — C81.90 HODGKIN LYMPHOMA, UNSPECIFIED HODGKIN LYMPHOMA TYPE, UNSPECIFIED BODY REGION (HCC): ICD-10-CM

## 2025-04-24 DIAGNOSIS — C81.90 HODGKIN LYMPHOMA, UNSPECIFIED HODGKIN LYMPHOMA TYPE, UNSPECIFIED BODY REGION (HCC): Primary | ICD-10-CM

## 2025-04-24 LAB
ALBUMIN SERPL-MCNC: 3.8 G/DL (ref 3.5–5)
ALBUMIN/GLOB SERPL: 1.2 (ref 1–1.9)
ALP SERPL-CCNC: 51 U/L (ref 40–129)
ALT SERPL-CCNC: 26 U/L (ref 8–55)
ANION GAP SERPL CALC-SCNC: 13 MMOL/L (ref 7–16)
AST SERPL-CCNC: 43 U/L (ref 15–37)
BASOPHILS # BLD: 0.06 K/UL (ref 0–0.2)
BASOPHILS NFR BLD: 0.7 % (ref 0–2)
BILIRUB SERPL-MCNC: 0.4 MG/DL (ref 0–1.2)
BUN SERPL-MCNC: 30 MG/DL (ref 6–23)
CALCIUM SERPL-MCNC: 9.3 MG/DL (ref 8.8–10.2)
CHLORIDE SERPL-SCNC: 104 MMOL/L (ref 98–107)
CO2 SERPL-SCNC: 24 MMOL/L (ref 20–29)
CREAT SERPL-MCNC: 2.04 MG/DL (ref 0.8–1.3)
DIFFERENTIAL METHOD BLD: ABNORMAL
EOSINOPHIL # BLD: 0.21 K/UL (ref 0–0.8)
EOSINOPHIL NFR BLD: 2.6 % (ref 0.5–7.8)
ERYTHROCYTE [DISTWIDTH] IN BLOOD BY AUTOMATED COUNT: 14.4 % (ref 11.9–14.6)
GLOBULIN SER CALC-MCNC: 3.3 G/DL (ref 2.3–3.5)
GLUCOSE SERPL-MCNC: 118 MG/DL (ref 70–99)
HCT VFR BLD AUTO: 42.7 % (ref 41.1–50.3)
HGB BLD-MCNC: 14.1 G/DL (ref 13.6–17.2)
IMM GRANULOCYTES # BLD AUTO: 0.04 K/UL (ref 0–0.5)
IMM GRANULOCYTES NFR BLD AUTO: 0.5 % (ref 0–5)
LYMPHOCYTES # BLD: 2.31 K/UL (ref 0.5–4.6)
LYMPHOCYTES NFR BLD: 28.7 % (ref 13–44)
MCH RBC QN AUTO: 29.5 PG (ref 26.1–32.9)
MCHC RBC AUTO-ENTMCNC: 33 G/DL (ref 31.4–35)
MCV RBC AUTO: 89.3 FL (ref 82–102)
MONOCYTES # BLD: 0.6 K/UL (ref 0.1–1.3)
MONOCYTES NFR BLD: 7.5 % (ref 4–12)
NEUTS SEG # BLD: 4.83 K/UL (ref 1.7–8.2)
NEUTS SEG NFR BLD: 60 % (ref 43–78)
NRBC # BLD: 0 K/UL (ref 0–0.2)
PLATELET # BLD AUTO: 368 K/UL (ref 150–450)
PMV BLD AUTO: 9.2 FL (ref 9.4–12.3)
POTASSIUM SERPL-SCNC: 4.2 MMOL/L (ref 3.5–5.1)
PROT SERPL-MCNC: 7.1 G/DL (ref 6.3–8.2)
RBC # BLD AUTO: 4.78 M/UL (ref 4.23–5.6)
SODIUM SERPL-SCNC: 141 MMOL/L (ref 136–145)
WBC # BLD AUTO: 8.1 K/UL (ref 4.3–11.1)

## 2025-04-24 PROCEDURE — 80053 COMPREHEN METABOLIC PANEL: CPT

## 2025-04-24 PROCEDURE — 85025 COMPLETE CBC W/AUTO DIFF WBC: CPT

## 2025-04-24 PROCEDURE — 3075F SYST BP GE 130 - 139MM HG: CPT | Performed by: INTERNAL MEDICINE

## 2025-04-24 PROCEDURE — 99213 OFFICE O/P EST LOW 20 MIN: CPT | Performed by: INTERNAL MEDICINE

## 2025-04-24 PROCEDURE — 3079F DIAST BP 80-89 MM HG: CPT | Performed by: INTERNAL MEDICINE

## 2025-04-24 PROCEDURE — 36415 COLL VENOUS BLD VENIPUNCTURE: CPT

## 2025-04-24 NOTE — PATIENT INSTRUCTIONS
Patient Information from Today's Visit    The members of your [facility] are listed below:    Physician Provider: Dr. Arnold Joseph  Advanced Practice Clinician: Marta Gilmore  Registered Nurse: Serena MORALES   Nurse Navigator: N/A  Medical Assistant: Contreras LOWERY  : Jazmín SINCLAIR  Supportive Care Services: HUGO Cadena    Diagnosis (Information Sheet Provided on Day of Diagnosis): Lymphoma    Follow Up Instructions:   - Labs reviewed  - Discussed recent scan results  - Unable to find any evidence of malignancy to explain weight loss  - Will not repeat any more imaging unless you develop new symptoms    Follow up in 6 months with labs prior    Has Treatment Plan Been Finalized? N/A     Current Labs:   Hospital Outpatient Visit on 04/24/2025   Component Date Value Ref Range Status    WBC 04/24/2025 8.1  4.3 - 11.1 K/uL Final    RBC 04/24/2025 4.78  4.23 - 5.6 M/uL Final    Hemoglobin 04/24/2025 14.1  13.6 - 17.2 g/dL Final    Hematocrit 04/24/2025 42.7  41.1 - 50.3 % Final    MCV 04/24/2025 89.3  82.0 - 102.0 FL Final    MCH 04/24/2025 29.5  26.1 - 32.9 PG Final    MCHC 04/24/2025 33.0  31.4 - 35.0 g/dL Final    RDW 04/24/2025 14.4  11.9 - 14.6 % Final    Platelets 04/24/2025 368  150 - 450 K/uL Final    MPV 04/24/2025 9.2 (L)  9.4 - 12.3 FL Final    nRBC 04/24/2025 0.00  0.0 - 0.2 K/uL Final    **Note: Absolute NRBC parameter is now reported with Hemogram**    Differential Type 04/24/2025 AUTOMATED    Final    Neutrophils % 04/24/2025 60.0  43.0 - 78.0 % Final    Lymphocytes % 04/24/2025 28.7  13.0 - 44.0 % Final    Monocytes % 04/24/2025 7.5  4.0 - 12.0 % Final    Eosinophils % 04/24/2025 2.6  0.5 - 7.8 % Final    Basophils % 04/24/2025 0.7  0.0 - 2.0 % Final    Immature Granulocytes % 04/24/2025 0.5  0.0 - 5.0 % Final    Neutrophils Absolute 04/24/2025 4.83  1.70 - 8.20 K/UL Final    Lymphocytes Absolute 04/24/2025 2.31  0.50 - 4.60 K/UL Final    Monocytes Absolute 04/24/2025 0.60  0.10 - 1.30

## 2025-04-28 NOTE — PROGRESS NOTES
GASTROENTEROLOGY CLINIC VISIT    CC: dysphagia, unintentional wt loss    HPI  Zane Fitch is a 59 y.o. year old male, new patient, who presents to the clinic today for evaluation of dysphagia, unintentional wt loss. PMH pertinent for Hodgkin's lymphoma, GERD. Pt was referred by oncology. Referral note from 4/3/25 reviewed. Pt reports intermittent dysphagia, specifically with large pills or large bites of food, over past few months. States occurs \"every once in a while\". Denies associated heartburn. Reports about 50-60 lb wt loss over the past year. He states it initially was intentional but then he continued to drop weight beyond what he wanted to. States oncology wanted him to have EGD and colonoscopy. CT A/P with no evidence of malignancy 4/17. Denies frequent NSAID, tobacco, alcohol use. States he was treated with chemo and stem cell transplant for Hodgkin's lymphoma about 20 yrs ago. He states there is no evidence of recurrence at this time.    PMHx/PSHx  PMHx: GERD, Hodgkin lymphoma, T2DM, HTN, HLD, KATERINA    Family History  Denies FMH gastric or colorectal cancer.   Denies FMH autoimmune disease.     Social History  Smoking history: never  Alcohol use: no    ROS  Review of Systems   Constitutional:  Positive for unexpected weight change. Negative for activity change, appetite change, chills, fatigue and fever.   HENT:  Positive for trouble swallowing.    Gastrointestinal:  Negative for abdominal distention, abdominal pain, anal bleeding, blood in stool, constipation, diarrhea, nausea and vomiting.       Vitals:    04/29/25 1323   BP: 139/86   Pulse: 71   SpO2: 98%       Physical Exam  Vitals and nursing note reviewed.   Constitutional:       General: He is not in acute distress.     Appearance: Normal appearance. He is not ill-appearing, toxic-appearing or diaphoretic.   HENT:      Head: Normocephalic and atraumatic.   Eyes:      General: No scleral icterus.     Conjunctiva/sclera: Conjunctivae

## 2025-04-29 ENCOUNTER — PREP FOR PROCEDURE (OUTPATIENT)
Dept: GASTROENTEROLOGY | Age: 60
End: 2025-04-29

## 2025-04-29 ENCOUNTER — OFFICE VISIT (OUTPATIENT)
Dept: GASTROENTEROLOGY | Age: 60
End: 2025-04-29
Payer: COMMERCIAL

## 2025-04-29 VITALS
HEIGHT: 70 IN | SYSTOLIC BLOOD PRESSURE: 139 MMHG | DIASTOLIC BLOOD PRESSURE: 86 MMHG | HEART RATE: 71 BPM | BODY MASS INDEX: 26.34 KG/M2 | WEIGHT: 184 LBS | OXYGEN SATURATION: 98 %

## 2025-04-29 DIAGNOSIS — R13.10 DYSPHAGIA, UNSPECIFIED TYPE: Primary | ICD-10-CM

## 2025-04-29 DIAGNOSIS — R63.4 UNINTENTIONAL WEIGHT LOSS: ICD-10-CM

## 2025-04-29 DIAGNOSIS — R63.4 WEIGHT LOSS: ICD-10-CM

## 2025-04-29 PROCEDURE — 99204 OFFICE O/P NEW MOD 45 MIN: CPT | Performed by: PHYSICIAN ASSISTANT

## 2025-04-29 PROCEDURE — 3075F SYST BP GE 130 - 139MM HG: CPT | Performed by: PHYSICIAN ASSISTANT

## 2025-04-29 PROCEDURE — 3079F DIAST BP 80-89 MM HG: CPT | Performed by: PHYSICIAN ASSISTANT

## 2025-04-29 RX ORDER — SODIUM CHLORIDE 0.9 % (FLUSH) 0.9 %
5-40 SYRINGE (ML) INJECTION EVERY 12 HOURS SCHEDULED
Status: CANCELLED | OUTPATIENT
Start: 2025-04-29

## 2025-04-29 RX ORDER — SODIUM CHLORIDE 9 MG/ML
25 INJECTION, SOLUTION INTRAVENOUS PRN
Status: CANCELLED | OUTPATIENT
Start: 2025-04-29

## 2025-04-29 RX ORDER — SODIUM CHLORIDE 0.9 % (FLUSH) 0.9 %
5-40 SYRINGE (ML) INJECTION PRN
Status: CANCELLED | OUTPATIENT
Start: 2025-04-29

## 2025-04-29 ASSESSMENT — ENCOUNTER SYMPTOMS
ABDOMINAL DISTENTION: 0
TROUBLE SWALLOWING: 1
BLOOD IN STOOL: 0
ANAL BLEEDING: 0
ABDOMINAL PAIN: 0
VOMITING: 0
NAUSEA: 0
CONSTIPATION: 0
DIARRHEA: 0

## 2025-05-01 ENCOUNTER — OFFICE VISIT (OUTPATIENT)
Dept: UROLOGY | Age: 60
End: 2025-05-01
Payer: COMMERCIAL

## 2025-05-01 DIAGNOSIS — N20.0 KIDNEY STONE: Primary | ICD-10-CM

## 2025-05-01 LAB
BILIRUBIN, URINE, POC: NEGATIVE
BLOOD URINE, POC: NORMAL
GLUCOSE URINE, POC: NEGATIVE MG/DL
KETONES, URINE, POC: NEGATIVE MG/DL
LEUKOCYTE ESTERASE, URINE, POC: NORMAL
NITRITE, URINE, POC: NEGATIVE
PH, URINE, POC: 5.5 (ref 4.6–8)
PROTEIN,URINE, POC: >=300 MG/DL
SPECIFIC GRAVITY, URINE, POC: 1.03 (ref 1–1.03)
URINALYSIS CLARITY, POC: NORMAL
URINALYSIS COLOR, POC: NORMAL
UROBILINOGEN, POC: NORMAL MG/DL

## 2025-05-01 PROCEDURE — 99024 POSTOP FOLLOW-UP VISIT: CPT | Performed by: NURSE PRACTITIONER

## 2025-05-01 PROCEDURE — 81003 URINALYSIS AUTO W/O SCOPE: CPT | Performed by: NURSE PRACTITIONER

## 2025-05-01 ASSESSMENT — ENCOUNTER SYMPTOMS
NAUSEA: 0
BACK PAIN: 0

## 2025-05-01 NOTE — PROGRESS NOTES
[facility]  [address] [place] [id number]  [phone]          Zane Fitch  : 1965    Chief Complaint   Patient presents with    Follow-up    Nephrolithiasis          HPI     Zane Fitch is a 59 y.o. male    Here today for follow-up after undergoing a right ureteral pyeloscopy, holmium laser lithotripsy of right ureteral stone 10 mm in size and also lower pole right renal stone.  A stent was left.  He is back today for KUB and stent removal.  Overall he is doing okay.  He has had normal stent irritation.  He is hoping to have the stent removed.      Past Medical History:   Diagnosis Date    Diabetes (HCC)     type 2- oral agents- does not check glucose; denies hypoglycemia;    Generalized osteoarthrosis, unspecified site 2013    knees    GERD (gastroesophageal reflux disease)     prn meds    Hearing loss     Hepatomegaly 2013    History of athlete's foot     History of cerebral venous sinus thrombosis associated with congenital heart disease     Pt denies any knowledge of this dx - Pt denies ever having this dx    History of kidney stones     Hodgkin lymphoma (HCC)     dx - no recurrence- chemo and stem cell     Hyperlipidemia     Hypertension     medication    Neuromuscular disorder (HCC)     Neuropathy     OA (osteoarthritis)     knees    Obstructive sleep apnea (adult) (pediatric) 2013    uses CPAP    Prediabetes      Past Surgical History:   Procedure Laterality Date    CATARACT REMOVAL Bilateral 2022    COLONOSCOPY N/A 2017    COLONOSCOPY / BMI=31 performed by Howard Wright MD at [facility] ENDOSCOPY    COLSC FLX W/REMOVAL LESION BY HOT BX FORCEPS  2017         CYSTOSCOPY Right 2025    CYSTOSCOPY, RIGHT URETEROSCOPY, LASER LITHO, RIGHT STENT performed by River Valadez Jr., MD at [facility] MAIN OR    IR GUIDED NEPHROSTOMY CATH PLACEMENT LEFT  2023    IR NEPHROSTOMY PERCUTANEOUS LEFT 2023 [facility] RADIOLOGY SPECIALS

## 2025-05-19 NOTE — PROGRESS NOTES
Patient verified name, , and procedure.    Type: 1A; abbreviated assessment per anesthesia guidelines.    Labs per anesthesia: POCT Glucose- held for DOS.  EKG: Not needed, per anesthesia guidelines.     Instructed pt that they will be notified the day before their procedure by the GI Lab for time of arrival if their procedure is [place] and Pre-op for [place] cases. Arrival times should be called by 5 pm. If no phone is received the patient should contact their respective hospital. The GI lab telephone number is [phone] and ES Pre-op is [phone].     Follow diet and prep instructions per office, including NPO status.      Bath or shower the night before and the am of surgery with non-moisturizing soap. No lotions, oils, powders, perfumes, or cologne on skin. No make up, eye make up or jewelry. Wear loose fitting comfortable, clean clothing.     Must have adult present in building the entire time .     Medications to take on the day of procedure: Ondansetron- if needed, Azelastine nasal spray, Flonase nasal spray, Omeprazole- if needed, per anesthesia guidelines.    Medications to hold: None, per anesthesia guidelines.     Patient instructed to hold all vitamins/supplements 7 days prior to surgery and NSAIDS 5 days prior to surgery. Verbalized understanding.     The following discharge instructions reviewed with patient: medication given during procedure may cause drowsiness for several hours, therefore, do not drive or operate machinery for remainder of the day. You may not drink alcohol on the day of your procedure, please resume regular diet and activity unless otherwise directed. You may experience abdominal distention for several hours that is relieved by the passage of gas. Contact your physician if you have any of the following: fever or chills, severe abdominal pain or excessive amount of bleeding or a large amount when having a bowel movement. Occasional specks of blood with bowel movement would not

## 2025-06-06 NOTE — PROGRESS NOTES
RN called Pt to confirm appointment time of 0730, arrival time 0600, location,  requirement, and instructions for registration at the hospital.  Pt verbalized understanding.

## 2025-06-08 ENCOUNTER — ANESTHESIA EVENT (OUTPATIENT)
Dept: ENDOSCOPY | Age: 60
End: 2025-06-08
Payer: COMMERCIAL

## 2025-06-08 RX ORDER — SODIUM CHLORIDE 9 MG/ML
INJECTION, SOLUTION INTRAVENOUS PRN
Status: CANCELLED | OUTPATIENT
Start: 2025-06-08

## 2025-06-08 RX ORDER — SODIUM CHLORIDE, SODIUM LACTATE, POTASSIUM CHLORIDE, CALCIUM CHLORIDE 600; 310; 30; 20 MG/100ML; MG/100ML; MG/100ML; MG/100ML
INJECTION, SOLUTION INTRAVENOUS CONTINUOUS
Status: CANCELLED | OUTPATIENT
Start: 2025-06-08

## 2025-06-08 RX ORDER — SODIUM CHLORIDE 0.9 % (FLUSH) 0.9 %
5-40 SYRINGE (ML) INJECTION EVERY 12 HOURS SCHEDULED
Status: CANCELLED | OUTPATIENT
Start: 2025-06-08

## 2025-06-08 RX ORDER — DEXTROSE MONOHYDRATE 100 MG/ML
INJECTION, SOLUTION INTRAVENOUS CONTINUOUS PRN
Status: CANCELLED | OUTPATIENT
Start: 2025-06-08

## 2025-06-08 RX ORDER — IBUPROFEN 600 MG/1
1 TABLET ORAL PRN
Status: CANCELLED | OUTPATIENT
Start: 2025-06-08

## 2025-06-08 RX ORDER — SODIUM CHLORIDE 0.9 % (FLUSH) 0.9 %
5-40 SYRINGE (ML) INJECTION PRN
Status: CANCELLED | OUTPATIENT
Start: 2025-06-08

## 2025-06-08 RX ORDER — NALOXONE HYDROCHLORIDE 0.4 MG/ML
INJECTION, SOLUTION INTRAMUSCULAR; INTRAVENOUS; SUBCUTANEOUS PRN
Status: CANCELLED | OUTPATIENT
Start: 2025-06-08

## 2025-06-09 ENCOUNTER — HOSPITAL ENCOUNTER (OUTPATIENT)
Age: 60
Discharge: HOME | End: 2025-06-09
Attending: STUDENT IN AN ORGANIZED HEALTH CARE EDUCATION/TRAINING PROGRAM | Admitting: STUDENT IN AN ORGANIZED HEALTH CARE EDUCATION/TRAINING PROGRAM
Payer: COMMERCIAL

## 2025-06-09 ENCOUNTER — ANESTHESIA (OUTPATIENT)
Dept: ENDOSCOPY | Age: 60
End: 2025-06-09
Payer: COMMERCIAL

## 2025-06-09 VITALS
HEIGHT: 70 IN | SYSTOLIC BLOOD PRESSURE: 122 MMHG | OXYGEN SATURATION: 92 % | RESPIRATION RATE: 26 BRPM | DIASTOLIC BLOOD PRESSURE: 85 MMHG | TEMPERATURE: 98.2 F | HEART RATE: 53 BPM | WEIGHT: 178 LBS | BODY MASS INDEX: 25.48 KG/M2

## 2025-06-09 LAB
GLUCOSE BLD STRIP.AUTO-MCNC: 73 MG/DL (ref 65–100)
SERVICE CMNT-IMP: NORMAL

## 2025-06-09 PROCEDURE — 82962 GLUCOSE BLOOD TEST: CPT

## 2025-06-09 PROCEDURE — 3609010600 HC COLONOSCOPY POLYPECTOMY SNARE/COLD BIOPSY: Performed by: STUDENT IN AN ORGANIZED HEALTH CARE EDUCATION/TRAINING PROGRAM

## 2025-06-09 PROCEDURE — 7100000011 HC PHASE II RECOVERY - ADDTL 15 MIN: Performed by: STUDENT IN AN ORGANIZED HEALTH CARE EDUCATION/TRAINING PROGRAM

## 2025-06-09 PROCEDURE — 88305 TISSUE EXAM BY PATHOLOGIST: CPT

## 2025-06-09 PROCEDURE — 2709999900 HC NON-CHARGEABLE SUPPLY: Performed by: STUDENT IN AN ORGANIZED HEALTH CARE EDUCATION/TRAINING PROGRAM

## 2025-06-09 PROCEDURE — 3700000001 HC ADD 15 MINUTES (ANESTHESIA): Performed by: STUDENT IN AN ORGANIZED HEALTH CARE EDUCATION/TRAINING PROGRAM

## 2025-06-09 PROCEDURE — 3700000000 HC ANESTHESIA ATTENDED CARE: Performed by: STUDENT IN AN ORGANIZED HEALTH CARE EDUCATION/TRAINING PROGRAM

## 2025-06-09 PROCEDURE — 6360000002 HC RX W HCPCS: Performed by: NURSE ANESTHETIST, CERTIFIED REGISTERED

## 2025-06-09 PROCEDURE — 7100000010 HC PHASE II RECOVERY - FIRST 15 MIN: Performed by: STUDENT IN AN ORGANIZED HEALTH CARE EDUCATION/TRAINING PROGRAM

## 2025-06-09 PROCEDURE — 2500000003 HC RX 250 WO HCPCS: Performed by: NURSE ANESTHETIST, CERTIFIED REGISTERED

## 2025-06-09 PROCEDURE — 3609012700 HC EGD DILATION SAVORY: Performed by: STUDENT IN AN ORGANIZED HEALTH CARE EDUCATION/TRAINING PROGRAM

## 2025-06-09 PROCEDURE — C1769 GUIDE WIRE: HCPCS | Performed by: STUDENT IN AN ORGANIZED HEALTH CARE EDUCATION/TRAINING PROGRAM

## 2025-06-09 PROCEDURE — 2580000003 HC RX 258: Performed by: ANESTHESIOLOGY

## 2025-06-09 RX ORDER — SODIUM CHLORIDE 0.9 % (FLUSH) 0.9 %
5-40 SYRINGE (ML) INJECTION PRN
Status: DISCONTINUED | OUTPATIENT
Start: 2025-06-09 | End: 2025-06-09 | Stop reason: HOSPADM

## 2025-06-09 RX ORDER — PROPOFOL 10 MG/ML
INJECTION, EMULSION INTRAVENOUS
Status: DISCONTINUED | OUTPATIENT
Start: 2025-06-09 | End: 2025-06-09 | Stop reason: SDUPTHER

## 2025-06-09 RX ORDER — DEXMEDETOMIDINE HYDROCHLORIDE 100 UG/ML
INJECTION, SOLUTION INTRAVENOUS
Status: DISCONTINUED | OUTPATIENT
Start: 2025-06-09 | End: 2025-06-09 | Stop reason: SDUPTHER

## 2025-06-09 RX ORDER — LIDOCAINE HYDROCHLORIDE 20 MG/ML
INJECTION, SOLUTION EPIDURAL; INFILTRATION; INTRACAUDAL; PERINEURAL
Status: DISCONTINUED | OUTPATIENT
Start: 2025-06-09 | End: 2025-06-09 | Stop reason: SDUPTHER

## 2025-06-09 RX ORDER — SODIUM CHLORIDE, SODIUM LACTATE, POTASSIUM CHLORIDE, CALCIUM CHLORIDE 600; 310; 30; 20 MG/100ML; MG/100ML; MG/100ML; MG/100ML
INJECTION, SOLUTION INTRAVENOUS CONTINUOUS
Status: DISCONTINUED | OUTPATIENT
Start: 2025-06-09 | End: 2025-06-09 | Stop reason: HOSPADM

## 2025-06-09 RX ORDER — SODIUM CHLORIDE 0.9 % (FLUSH) 0.9 %
5-40 SYRINGE (ML) INJECTION EVERY 12 HOURS SCHEDULED
Status: DISCONTINUED | OUTPATIENT
Start: 2025-06-09 | End: 2025-06-09 | Stop reason: HOSPADM

## 2025-06-09 RX ORDER — GLYCOPYRROLATE 0.2 MG/ML
INJECTION INTRAMUSCULAR; INTRAVENOUS
Status: DISCONTINUED | OUTPATIENT
Start: 2025-06-09 | End: 2025-06-09 | Stop reason: SDUPTHER

## 2025-06-09 RX ORDER — SODIUM CHLORIDE 9 MG/ML
25 INJECTION, SOLUTION INTRAVENOUS PRN
Status: DISCONTINUED | OUTPATIENT
Start: 2025-06-09 | End: 2025-06-09 | Stop reason: HOSPADM

## 2025-06-09 RX ORDER — SODIUM CHLORIDE 9 MG/ML
INJECTION, SOLUTION INTRAVENOUS PRN
Status: DISCONTINUED | OUTPATIENT
Start: 2025-06-09 | End: 2025-06-09 | Stop reason: HOSPADM

## 2025-06-09 RX ORDER — LIDOCAINE HYDROCHLORIDE 10 MG/ML
1 INJECTION, SOLUTION INFILTRATION; PERINEURAL
Status: DISCONTINUED | OUTPATIENT
Start: 2025-06-09 | End: 2025-06-09 | Stop reason: HOSPADM

## 2025-06-09 RX ADMIN — DEXMEDETOMIDINE 8 MCG: 100 INJECTION, SOLUTION, CONCENTRATE INTRAVENOUS at 07:23

## 2025-06-09 RX ADMIN — DEXMEDETOMIDINE 12 MCG: 100 INJECTION, SOLUTION, CONCENTRATE INTRAVENOUS at 07:29

## 2025-06-09 RX ADMIN — LIDOCAINE HYDROCHLORIDE 100 MG: 20 INJECTION, SOLUTION EPIDURAL; INFILTRATION; INTRACAUDAL; PERINEURAL at 07:27

## 2025-06-09 RX ADMIN — GLYCOPYRROLATE 0.2 MG: 0.2 INJECTION INTRAMUSCULAR; INTRAVENOUS at 07:23

## 2025-06-09 RX ADMIN — PROPOFOL 50 MG: 10 INJECTION, EMULSION INTRAVENOUS at 07:32

## 2025-06-09 RX ADMIN — PROPOFOL 30 MG: 10 INJECTION, EMULSION INTRAVENOUS at 07:29

## 2025-06-09 RX ADMIN — PROPOFOL 120 MCG/KG/MIN: 10 INJECTION, EMULSION INTRAVENOUS at 07:37

## 2025-06-09 RX ADMIN — SODIUM CHLORIDE, SODIUM LACTATE, POTASSIUM CHLORIDE, AND CALCIUM CHLORIDE: 600; 310; 30; 20 INJECTION, SOLUTION INTRAVENOUS at 07:23

## 2025-06-09 RX ADMIN — PROPOFOL 50 MG: 10 INJECTION, EMULSION INTRAVENOUS at 07:27

## 2025-06-09 ASSESSMENT — PAIN - FUNCTIONAL ASSESSMENT: PAIN_FUNCTIONAL_ASSESSMENT: 0-10

## 2025-06-09 NOTE — H&P
Zane Fitch is a 59 y.o. year old male, new patient, who presents to the clinic today for evaluation of dysphagia, unintentional wt loss. PMH pertinent for Hodgkin's lymphoma, GERD. Pt was referred by oncology. Referral note from 4/3/25 reviewed. Pt reports intermittent dysphagia, specifically with large pills or large bites of food, over past few months. States occurs \"every once in a while\". Denies associated heartburn. Reports about 50-60 lb wt loss over the past year. He states it initially was intentional but then he continued to drop weight beyond what he wanted to. States oncology wanted him to have EGD and colonoscopy. CT A/P with no evidence of malignancy 4/17. Denies frequent NSAID, tobacco, alcohol use. States he was treated with chemo and stem cell transplant for Hodgkin's lymphoma about 20 yrs ago. He states there is no evidence of recurrence at this time.     PMHx/PSHx  PMHx: GERD, Hodgkin lymphoma, T2DM, HTN, HLD, KATERINA     Family History  Denies FMH gastric or colorectal cancer.   Denies FMH autoimmune disease.      Social History  Smoking history: never  Alcohol use: no     ROS  Review of Systems   Constitutional:  Positive for unexpected weight change. Negative for activity change, appetite change, chills, fatigue and fever.   HENT:  Positive for trouble swallowing.    Gastrointestinal:  Negative for abdominal distention, abdominal pain, anal bleeding, blood in stool, constipation, diarrhea, nausea and vomiting.             Vitals:     04/29/25 1323   BP: 139/86   Pulse: 71   SpO2: 98%         Physical Exam  Vitals and nursing note reviewed.   Constitutional:       General: He is not in acute distress.     Appearance: Normal appearance. He is not ill-appearing, toxic-appearing or diaphoretic.   HENT:      Head: Normocephalic and atraumatic.   Eyes:      General: No scleral icterus.     Conjunctiva/sclera: Conjunctivae normal.   Cardiovascular:      Rate and Rhythm: Normal rate.   Pulmonary:

## 2025-06-09 NOTE — ANESTHESIA POSTPROCEDURE EVALUATION
Department of Anesthesiology  Postprocedure Note    Patient: Zane Fitch  MRN: [id number]  YOB: 1965  Date of evaluation: 6/9/2025    Procedure Summary       Date: 06/09/25 Room / Location: [facility] ENDO 04 / [facility] ENDOSCOPY    Anesthesia Start: 0721 Anesthesia Stop: 0753    Procedures:       COLONOSCOPY POLYPECTOMY BIOPSY      ESOPHAGOGASTRODUODENOSCOPY DILATION SAVORY and biopsy (Upper GI Region) Diagnosis:       Dysphagia      Weight loss      (Dysphagia [R13.10])      (Weight loss [R63.4])    Surgeons: Seamus Rdz MD Responsible Provider: Darren Guevara MD    Anesthesia Type: TIVA ASA Status: 3            Anesthesia Type: No value filed.    Edgar Phase I: Edgar Score: 10    Edgar Phase II: Edgar Score: 10    Anesthesia Post Evaluation    Patient location during evaluation: PACU  Patient participation: complete - patient participated  Level of consciousness: awake and alert  Airway patency: patent  Nausea: well controlled.  Cardiovascular status: acceptable.  Respiratory status: acceptable  Hydration status: stable  Pain management: adequate    No notable events documented.

## 2025-06-09 NOTE — PROGRESS NOTES
Patient discharged. Passing flatus. Tolerating po fluids. No acute distress noted. Escorted to car, with family by staff.

## 2025-06-09 NOTE — DISCHARGE INSTRUCTIONS
Gastrointestinal Colonoscopy/Flexible Sigmoidoscopy - Lower Exam Discharge Instructions  Call Dr. Rdz at [phone] for any problems or questions.  Contact the doctor’s office for follow up appointment as directed  Medication may cause drowsiness for several hours, therefore, do not drive or operate machinery for remainder of the day.  No alcohol today.  Do not make any important decisions such signing legal paperwork.  Ordinarily, you may resume regular diet and activity after exam unless otherwise specified by your physician.  Because of air put into your colon during exam, you may experience some abdominal distension, relieved by the passage of gas, for several hours.  Contact your physician if you have any of the following:  Excessive amount of bleeding - large amount when having a bowel movement.  Occasional specks of blood with bowel movement would not be unusual.  Severe abdominal pain  Fever or Chills  Polyp Removal - follow these additional instructions  Take Metamucil - 1 tablespoon in juice every morning for 3 days, if needed.  No Aspirin, Advil, Aleve, Nuprin, Ibuprofen, or medications that contain these drugs for 2 weeks, unless prescribed by your doctor.        Gastrointestinal Esophagogastroduodenoscopy (EGD) - Upper Exam Discharge Instructions       For mild soreness in your throat you may use Cepacol throat lozenges or warm salt-water gargles as needed.

## 2025-06-09 NOTE — ANESTHESIA PRE PROCEDURE
Department of Anesthesiology  Preprocedure Note       Name:  Zane Fitch   Age:  59 y.o.  :  1965                                          MRN:  [id number]         Date:  2025      Surgeon: Surgeon(s):  Seamus Rdz MD    Procedure: Procedure(s):  COLORECTAL CANCER SCREENING, NOT HIGH RISK  ESOPHAGOGASTRODUODENOSCOPY    Medications prior to admission:   Prior to Admission medications    Medication Sig Start Date End Date Taking? Authorizing Provider   tamsulosin (FLOMAX) 0.4 MG capsule Take 1 capsule by mouth nightly 25  Yes Mikie Centeno DO   azelastine (ASTELIN) 0.1 % nasal spray 1 spray by Nasal route 2 times daily Use in each nostril as directed 25  Yes Shima Garber APRN - CNP   fluticasone (FLONASE) 50 MCG/ACT nasal spray 1 spray by Each Nostril route in the morning and at bedtime 25  Yes Shima Garber APRN - CNP   atorvastatin (LIPITOR) 80 MG tablet TAKE 1 TABLET NIGHTLY FOR CHOLESTEROL 25  Yes Shima Garber APRN - CNP   atenolol (TENORMIN) 25 MG tablet TAKE 1 TABLET AT BEDTIME FOR BLOOD PRESSURE 25  Yes Shima Garber APRN - CNP   amitriptyline (ELAVIL) 50 MG tablet TAKE 1 TABLET NIGHTLY FOR SLEEP / NEUROPATHY 25  Yes Shima Garber APRN - CNP   gabapentin (NEURONTIN) 300 MG capsule Take 1 capsule by mouth at bedtime. For nerve pain 24 Yes Shima Garber APRN - CNP   fenofibrate (TRIGLIDE) 160 MG tablet Take 1 tablet by mouth nightly For cholesterol 24  Yes Shima Garber APRN - CNP   ezetimibe (ZETIA) 10 MG tablet Take 1 tablet by mouth daily For cholesterol lowering  Patient taking differently: Take 1 tablet by mouth at bedtime For cholesterol lowering 24  Yes Shima Garber APRN - CNP   clotrimazole-betamethasone (LOTRISONE) 1-0.05 % cream Apply to 2 times per day for fungal treatment (athlete's foot)  Patient taking differently: Apply topically 2 times daily as needed (Athlete's foot) Apply to 2 times per day

## 2025-06-16 ENCOUNTER — RESULTS FOLLOW-UP (OUTPATIENT)
Dept: GASTROENTEROLOGY | Age: 60
End: 2025-06-16

## 2025-06-17 ENCOUNTER — TELEPHONE (OUTPATIENT)
Dept: GASTROENTEROLOGY | Age: 60
End: 2025-06-17

## 2025-06-17 DIAGNOSIS — A04.8 H. PYLORI INFECTION: Primary | ICD-10-CM

## 2025-06-17 RX ORDER — CLARITHROMYCIN 500 MG/1
500 TABLET ORAL 2 TIMES DAILY
Qty: 28 TABLET | Refills: 0 | Status: SHIPPED | OUTPATIENT
Start: 2025-06-17 | End: 2025-07-01

## 2025-06-17 RX ORDER — METRONIDAZOLE 500 MG/1
500 TABLET ORAL 3 TIMES DAILY
Qty: 42 TABLET | Refills: 0 | Status: SHIPPED | OUTPATIENT
Start: 2025-06-17 | End: 2025-07-01

## 2025-06-17 RX ORDER — OMEPRAZOLE 40 MG/1
CAPSULE, DELAYED RELEASE ORAL
Qty: 134 CAPSULE | Refills: 0 | Status: SHIPPED | OUTPATIENT
Start: 2025-06-17 | End: 2025-10-15

## 2025-06-17 NOTE — TELEPHONE ENCOUNTER
Called patient with EGD biopsy results per Dr Rdz:    \"Tell the patient that H.pylori test is positive, this bacteria can cause ulcers or gastritis therefore we need to treat it at this time with two antibiotics and omeprazole. After treatment patient needs repeat H.pylori breath test in 3 months to ensure that bacteria is cleared.     Antibiotics as below if no penicillin allergy:  Amoxicillin 1g BID for 14 days  Clarithromycin 500 mg BID for 14 days     Antibiotics as below if there is penicillin allergy:  Metronidazole 500 mg three times daily for 14 days  Clarithromycin 500 mg BID for 14 days     During antibiotic treatment he needs to stay on PPI twice daily (pantoprazole/omeprazole etc.).    Start:      omeprazole (Prilosec) 40 MG tablet, 2 times daily before meals.      clarithromycin (BIAXIN) 500 MG tablet, 2 times daily for 14 days.     metroNIDAZOLE (FLAGYL) 500 MG tablet,      take 1 tablet by mouth 3 times daily for 14 days.      No answer. LVM with results/ recommendation. Advised patient that medication would be sent to [facility] on file along with details in my chart message. As patient has documented allergy to Penicillin will send alternate treatment::       Take omeprazole 30 minutes prior to taking antibiotics in the morning and evening. Take the antibiotics with food to avoid stomach upset. To avoid antibiotic associated diarrhea,  over the counter Florastor or add yogurt with live and active cultures to daily regimen.   You will need to be retested for H. Pylori around  10/1 ,holding PPI for 2 weeks prior to submitting H. Pylori breath test.

## 2025-06-27 ENCOUNTER — OFFICE VISIT (OUTPATIENT)
Dept: UROLOGY | Age: 60
End: 2025-06-27
Payer: COMMERCIAL

## 2025-06-27 DIAGNOSIS — N20.0 NEPHROLITHIASIS: ICD-10-CM

## 2025-06-27 DIAGNOSIS — N20.0 KIDNEY STONE: Primary | ICD-10-CM

## 2025-06-27 LAB
BILIRUBIN, URINE, POC: NEGATIVE
BLOOD URINE, POC: NORMAL
GLUCOSE URINE, POC: NEGATIVE MG/DL
KETONES, URINE, POC: NEGATIVE MG/DL
LEUKOCYTE ESTERASE, URINE, POC: NEGATIVE
NITRITE, URINE, POC: NEGATIVE
PH, URINE, POC: 5.5 (ref 4.6–8)
PROTEIN,URINE, POC: NEGATIVE MG/DL
SPECIFIC GRAVITY, URINE, POC: 1.03 (ref 1–1.03)
URINALYSIS CLARITY, POC: NORMAL
URINALYSIS COLOR, POC: NORMAL
UROBILINOGEN, POC: NORMAL MG/DL

## 2025-06-27 PROCEDURE — 81003 URINALYSIS AUTO W/O SCOPE: CPT | Performed by: NURSE PRACTITIONER

## 2025-06-27 PROCEDURE — 99214 OFFICE O/P EST MOD 30 MIN: CPT | Performed by: NURSE PRACTITIONER

## 2025-06-27 RX ORDER — HYDROCHLOROTHIAZIDE 25 MG/1
25 TABLET ORAL EVERY MORNING
Qty: 90 TABLET | Refills: 3 | Status: SHIPPED | OUTPATIENT
Start: 2025-06-27

## 2025-06-27 RX ORDER — TAMSULOSIN HYDROCHLORIDE 0.4 MG/1
0.4 CAPSULE ORAL
Qty: 90 CAPSULE | Refills: 3 | Status: SHIPPED | OUTPATIENT
Start: 2025-06-27

## 2025-06-27 ASSESSMENT — ENCOUNTER SYMPTOMS
NAUSEA: 0
BACK PAIN: 0

## 2025-07-10 DIAGNOSIS — E78.5 HYPERLIPIDEMIA LDL GOAL <70: ICD-10-CM

## 2025-07-10 RX ORDER — EZETIMIBE 10 MG/1
TABLET ORAL
Qty: 90 TABLET | Refills: 3 | Status: SHIPPED | OUTPATIENT
Start: 2025-07-10

## 2025-07-21 ENCOUNTER — LAB (OUTPATIENT)
Dept: FAMILY MEDICINE CLINIC | Facility: CLINIC | Age: 60
End: 2025-07-21

## 2025-07-21 DIAGNOSIS — E55.9 VITAMIN D DEFICIENCY: ICD-10-CM

## 2025-07-21 DIAGNOSIS — Z01.84 LACK OF IMMUNITY TO HEPATITIS B VIRUS DEMONSTRATED BY SEROLOGIC TEST: ICD-10-CM

## 2025-07-21 DIAGNOSIS — E11.40 CONTROLLED TYPE 2 DIABETES MELLITUS WITH NEUROPATHY (HCC): ICD-10-CM

## 2025-07-21 LAB
25(OH)D3 SERPL-MCNC: 36.7 NG/ML (ref 30–100)
ALBUMIN SERPL-MCNC: 3.8 G/DL (ref 3.2–4.6)
ALBUMIN/GLOB SERPL: 1.4 (ref 1–1.9)
ALP SERPL-CCNC: 46 U/L (ref 40–129)
ALT SERPL-CCNC: 52 U/L (ref 8–55)
ANION GAP SERPL CALC-SCNC: 10 MMOL/L (ref 7–16)
AST SERPL-CCNC: 60 U/L (ref 15–37)
BASOPHILS # BLD: 0.06 K/UL (ref 0–0.2)
BASOPHILS NFR BLD: 0.8 % (ref 0–2)
BILIRUB SERPL-MCNC: 0.5 MG/DL (ref 0–1.2)
BUN SERPL-MCNC: 21 MG/DL (ref 8–23)
CALCIUM SERPL-MCNC: 9.8 MG/DL (ref 8.8–10.2)
CHLORIDE SERPL-SCNC: 105 MMOL/L (ref 98–107)
CHOLEST SERPL-MCNC: 152 MG/DL (ref 0–200)
CO2 SERPL-SCNC: 25 MMOL/L (ref 20–29)
CREAT SERPL-MCNC: 1.5 MG/DL (ref 0.8–1.3)
DIFFERENTIAL METHOD BLD: NORMAL
EOSINOPHIL # BLD: 0.16 K/UL (ref 0–0.8)
EOSINOPHIL NFR BLD: 2 % (ref 0.5–7.8)
ERYTHROCYTE [DISTWIDTH] IN BLOOD BY AUTOMATED COUNT: 13.8 % (ref 11.9–14.6)
EST. AVERAGE GLUCOSE BLD GHB EST-MCNC: 124 MG/DL
GLOBULIN SER CALC-MCNC: 2.8 G/DL (ref 2.3–3.5)
GLUCOSE SERPL-MCNC: 131 MG/DL (ref 70–99)
HBA1C MFR BLD: 5.9 % (ref 0–5.6)
HBV SURFACE AB SERPL IA-ACNC: <3.5 MIU/ML
HCT VFR BLD AUTO: 44.1 % (ref 41.1–50.3)
HDLC SERPL-MCNC: 34 MG/DL (ref 40–60)
HDLC SERPL: 4.4 (ref 0–5)
HGB BLD-MCNC: 14.4 G/DL (ref 13.6–17.2)
IMM GRANULOCYTES # BLD AUTO: 0.03 K/UL (ref 0–0.5)
IMM GRANULOCYTES NFR BLD AUTO: 0.4 % (ref 0–5)
LDLC SERPL CALC-MCNC: 89 MG/DL (ref 0–100)
LYMPHOCYTES # BLD: 1.94 K/UL (ref 0.5–4.6)
LYMPHOCYTES NFR BLD: 24.8 % (ref 13–44)
MCH RBC QN AUTO: 29.9 PG (ref 26.1–32.9)
MCHC RBC AUTO-ENTMCNC: 32.7 G/DL (ref 31.4–35)
MCV RBC AUTO: 91.7 FL (ref 82–102)
MONOCYTES # BLD: 0.57 K/UL (ref 0.1–1.3)
MONOCYTES NFR BLD: 7.3 % (ref 4–12)
NEUTS SEG # BLD: 5.05 K/UL (ref 1.7–8.2)
NEUTS SEG NFR BLD: 64.7 % (ref 43–78)
NRBC # BLD: 0 K/UL (ref 0–0.2)
PLATELET # BLD AUTO: 283 K/UL (ref 150–450)
PMV BLD AUTO: 10.3 FL (ref 9.4–12.3)
POTASSIUM SERPL-SCNC: 4.1 MMOL/L (ref 3.5–5.1)
PROT SERPL-MCNC: 6.6 G/DL (ref 6.3–8.2)
RBC # BLD AUTO: 4.81 M/UL (ref 4.23–5.6)
SODIUM SERPL-SCNC: 140 MMOL/L (ref 136–145)
TRIGL SERPL-MCNC: 144 MG/DL (ref 0–150)
TSH W FREE THYROID IF ABNORMAL: 2.96 UIU/ML (ref 0.27–4.2)
VLDLC SERPL CALC-MCNC: 29 MG/DL (ref 6–23)
WBC # BLD AUTO: 7.8 K/UL (ref 4.3–11.1)

## 2025-07-22 NOTE — PROGRESS NOTES
GASTROENTEROLOGY CLINIC VISIT    CC: follow up - EGD    HPI  Zane Fitch is a 60 y.o. year old male with PMH pertinent for Hodgkin's lymphoma, GERD who presents to the clinic today for follow up. Initial visit 4/29/25 for evaluation of dysphagia, weight loss. CT A/P with no evidence of malignancy 4/17. EGD 6/9 with positive H pylori. Colonoscopy with 1 tubular adenoma. Pt states that since last visit, his weight has been stable, and he may have started to gain weight back. Reports completing entire H pylori treatment without difficulty. States dysphagia is not bothersome to him at this time.     Pertinent History  PMHx: GERD, Hodgkin lymphoma, T2DM, HTN, HLD, KATERINA     GI Family History  Denies FMH gastric or colorectal cancer.   Denies FMH autoimmune disease.     Social History  Smoking history: never  Alcohol use: no    Past Medical History:   Diagnosis Date    Diabetes (HCC)     Type 2; A1C- 5.7 on 04/19/2025; Does not check BS at home; Denies s/sx of low BS; No meds currently    Generalized osteoarthrosis, unspecified site 9/7/2013    knees    GERD (gastroesophageal reflux disease)     prn meds    Hearing loss     Hepatomegaly 9/7/2013    History of athlete's foot     History of cerebral venous sinus thrombosis associated with congenital heart disease     Pt denies any knowledge of this dx 2019- Pt denies ever having this dx    History of kidney stones     Hodgkin lymphoma (HCC)     dx 2001- no recurrence- chemo and stem cell     Hyperlipidemia     Hypertension     medication    Kidney stone     Neuromuscular disorder (HCC)     Neuropathy     OA (osteoarthritis)     knees    Obstructive sleep apnea (adult) (pediatric) 9/7/2013    weight loss; does not use CPAP anymore    Prediabetes        Past Surgical History:   Procedure Laterality Date    CATARACT REMOVAL Bilateral 01/2022    COLONOSCOPY N/A 12/11/2017    COLONOSCOPY / BMI=31 performed by Howard Wright MD at [facility] ENDOSCOPY    COLONOSCOPY N/A

## 2025-07-23 ENCOUNTER — OFFICE VISIT (OUTPATIENT)
Dept: GASTROENTEROLOGY | Age: 60
End: 2025-07-23
Payer: COMMERCIAL

## 2025-07-23 VITALS
OXYGEN SATURATION: 99 % | HEART RATE: 66 BPM | WEIGHT: 189 LBS | BODY MASS INDEX: 27.06 KG/M2 | SYSTOLIC BLOOD PRESSURE: 136 MMHG | DIASTOLIC BLOOD PRESSURE: 81 MMHG | HEIGHT: 70 IN

## 2025-07-23 DIAGNOSIS — A04.8 H. PYLORI INFECTION: Primary | ICD-10-CM

## 2025-07-23 PROCEDURE — 99213 OFFICE O/P EST LOW 20 MIN: CPT | Performed by: PHYSICIAN ASSISTANT

## 2025-07-23 PROCEDURE — 3075F SYST BP GE 130 - 139MM HG: CPT | Performed by: PHYSICIAN ASSISTANT

## 2025-07-23 PROCEDURE — 3079F DIAST BP 80-89 MM HG: CPT | Performed by: PHYSICIAN ASSISTANT

## 2025-07-23 ASSESSMENT — ENCOUNTER SYMPTOMS
ABDOMINAL PAIN: 0
VOMITING: 0
CONSTIPATION: 0
ANAL BLEEDING: 0
DIARRHEA: 0
BLOOD IN STOOL: 0
ABDOMINAL DISTENTION: 0
NAUSEA: 0

## 2025-07-30 ENCOUNTER — OFFICE VISIT (OUTPATIENT)
Dept: FAMILY MEDICINE CLINIC | Facility: CLINIC | Age: 60
End: 2025-07-30
Payer: COMMERCIAL

## 2025-07-30 VITALS
OXYGEN SATURATION: 98 % | HEIGHT: 70 IN | HEART RATE: 71 BPM | SYSTOLIC BLOOD PRESSURE: 122 MMHG | WEIGHT: 187.2 LBS | BODY MASS INDEX: 26.8 KG/M2 | DIASTOLIC BLOOD PRESSURE: 68 MMHG

## 2025-07-30 DIAGNOSIS — E55.9 VITAMIN D DEFICIENCY: ICD-10-CM

## 2025-07-30 DIAGNOSIS — E11.40 CONTROLLED TYPE 2 DIABETES MELLITUS WITH NEUROPATHY (HCC): ICD-10-CM

## 2025-07-30 DIAGNOSIS — Z00.00 HEALTHCARE MAINTENANCE: ICD-10-CM

## 2025-07-30 DIAGNOSIS — B35.3 TINEA PEDIS OF BOTH FEET: ICD-10-CM

## 2025-07-30 DIAGNOSIS — Z00.00 LABORATORY EXAMINATION ORDERED AS PART OF A COMPLETE PHYSICAL EXAMINATION: ICD-10-CM

## 2025-07-30 DIAGNOSIS — E78.5 HYPERLIPIDEMIA LDL GOAL <70: ICD-10-CM

## 2025-07-30 DIAGNOSIS — G62.9 NEUROPATHY: ICD-10-CM

## 2025-07-30 DIAGNOSIS — J30.89 OTHER ALLERGIC RHINITIS: ICD-10-CM

## 2025-07-30 DIAGNOSIS — Z12.5 ENCOUNTER FOR PROSTATE CANCER SCREENING: ICD-10-CM

## 2025-07-30 DIAGNOSIS — Z86.19 HISTORY OF HELICOBACTER PYLORI INFECTION: Primary | ICD-10-CM

## 2025-07-30 PROCEDURE — 90471 IMMUNIZATION ADMIN: CPT | Performed by: NURSE PRACTITIONER

## 2025-07-30 PROCEDURE — 90677 PCV20 VACCINE IM: CPT | Performed by: NURSE PRACTITIONER

## 2025-07-30 PROCEDURE — 3044F HG A1C LEVEL LT 7.0%: CPT | Performed by: NURSE PRACTITIONER

## 2025-07-30 PROCEDURE — 3078F DIAST BP <80 MM HG: CPT | Performed by: NURSE PRACTITIONER

## 2025-07-30 PROCEDURE — 3074F SYST BP LT 130 MM HG: CPT | Performed by: NURSE PRACTITIONER

## 2025-07-30 PROCEDURE — 99214 OFFICE O/P EST MOD 30 MIN: CPT | Performed by: NURSE PRACTITIONER

## 2025-07-30 RX ORDER — GABAPENTIN 300 MG/1
300 CAPSULE ORAL NIGHTLY
Qty: 90 CAPSULE | Refills: 3 | Status: SHIPPED | OUTPATIENT
Start: 2025-07-30 | End: 2026-07-30

## 2025-07-30 RX ORDER — CLOTRIMAZOLE AND BETAMETHASONE DIPROPIONATE 10; .64 MG/G; MG/G
CREAM TOPICAL
Qty: 90 G | Refills: 11 | Status: SHIPPED | OUTPATIENT
Start: 2025-07-30

## 2025-07-30 RX ORDER — FENOFIBRATE 160 MG/1
160 TABLET ORAL NIGHTLY
Qty: 90 TABLET | Refills: 3 | Status: SHIPPED | OUTPATIENT
Start: 2025-07-30

## 2025-07-30 RX ORDER — TERBINAFINE HYDROCHLORIDE 250 MG/1
250 TABLET ORAL DAILY
Qty: 30 TABLET | Refills: 0 | Status: SHIPPED | OUTPATIENT
Start: 2025-07-30

## 2025-07-30 ASSESSMENT — ENCOUNTER SYMPTOMS
EYE DISCHARGE: 0
CHEST TIGHTNESS: 0
BLOOD IN STOOL: 0
SORE THROAT: 0
WHEEZING: 0
SINUS PAIN: 1
ANAL BLEEDING: 0
BACK PAIN: 0
APNEA: 0
GASTROINTESTINAL NEGATIVE: 1
ALLERGIC/IMMUNOLOGIC NEGATIVE: 1
EYE PAIN: 0
RHINORRHEA: 0
NAUSEA: 0
TROUBLE SWALLOWING: 0
SHORTNESS OF BREATH: 0
ABDOMINAL DISTENTION: 0
ABDOMINAL PAIN: 0
COUGH: 0
COLOR CHANGE: 0
PHOTOPHOBIA: 0
VOICE CHANGE: 0
EYES NEGATIVE: 1
RECTAL PAIN: 0
FACIAL SWELLING: 0
CONSTIPATION: 0
DIARRHEA: 0
RESPIRATORY NEGATIVE: 1
EYE REDNESS: 0
SINUS PRESSURE: 1
EYE ITCHING: 0
STRIDOR: 0
VOMITING: 0
CHOKING: 0

## 2025-07-30 ASSESSMENT — PATIENT HEALTH QUESTIONNAIRE - PHQ9
SUM OF ALL RESPONSES TO PHQ QUESTIONS 1-9: 0
2. FEELING DOWN, DEPRESSED OR HOPELESS: NOT AT ALL
1. LITTLE INTEREST OR PLEASURE IN DOING THINGS: NOT AT ALL
SUM OF ALL RESPONSES TO PHQ QUESTIONS 1-9: 0

## 2025-07-30 NOTE — PROGRESS NOTES
PROGRESS NOTE    Chief Complaint   Patient presents with    Discuss Labs     3 month lab follow up. Patient stated he needs to make sure he gets a baby shot today. Patient would also like cream for his nails       SUBJECTIVE:         History of Present Illness  The patient is a 60-year-old male with a history of Hodgkin lymphoma, currently established and following with oncology, elevated liver function, currently following GI, diabetes, hypertension and hyperlipidemia.    The patient presents for evaluation of H. pylori infection, diabetes, neuropathy, and fungal infection. He has been experiencing recurrent fungal infections on his fingers, which seem to resolve spontaneously but then reappear.     He has been seen and evaluated by GI recently and had an endoscopy procedure that revealed an H. pylori infection. Treatment was completed approximately a month ago, around June 2025.    He has not been taking his diabetes medication for the past three months and reports no symptoms of low blood sugar such as shakiness or sweating. He is not currently on Farxiga. He continues to take gabapentin at night, which aids his sleep.     He reports no drowsiness or sleepiness during the day. He also takes amitriptyline at night. He has been experiencing frequent stumbling and is considering applying for disability due to his worsening neuropathy.     He believes he received a hepatitis B vaccine in his youth and during his cancer treatment.            Past Medical History, Past Surgical History, Family history, Social History, and Medications were all reviewed with the patient today and updated as necessary.       Current Outpatient Medications   Medication Sig Dispense Refill    clotrimazole-betamethasone (LOTRISONE) 1-0.05 % cream Apply to 2 times per day for fungal treatment (athlete's foot) 90 g 11    hepatitis B vaccine (ENGERIX-B) 20 MCG/ML injection Inject 1 ml IM once for hep B vaccination, repeat second dose in 3

## 2025-08-22 ENCOUNTER — TELEPHONE (OUTPATIENT)
Dept: GASTROENTEROLOGY | Age: 60
End: 2025-08-22

## 2025-08-22 DIAGNOSIS — A04.8 H. PYLORI INFECTION: Primary | ICD-10-CM

## 2025-08-23 LAB — UREA BREATH TEST QL: NEGATIVE

## 2025-08-25 ENCOUNTER — RESULTS FOLLOW-UP (OUTPATIENT)
Dept: GASTROENTEROLOGY | Age: 60
End: 2025-08-25

## 2025-08-26 ENCOUNTER — LAB (OUTPATIENT)
Dept: FAMILY MEDICINE CLINIC | Facility: CLINIC | Age: 60
End: 2025-08-26
Payer: COMMERCIAL

## 2025-08-26 DIAGNOSIS — E11.40 CONTROLLED TYPE 2 DIABETES MELLITUS WITH NEUROPATHY (HCC): ICD-10-CM

## 2025-08-26 DIAGNOSIS — B35.3 TINEA PEDIS OF BOTH FEET: ICD-10-CM

## 2025-08-26 LAB
ALBUMIN SERPL-MCNC: 4.1 G/DL (ref 3.2–4.6)
ALBUMIN/GLOB SERPL: 1.4 (ref 1–1.9)
ALP SERPL-CCNC: 49 U/L (ref 40–129)
ALT SERPL-CCNC: 48 U/L (ref 8–55)
ANION GAP SERPL CALC-SCNC: 11 MMOL/L (ref 7–16)
AST SERPL-CCNC: 54 U/L (ref 15–37)
BILIRUB SERPL-MCNC: 0.6 MG/DL (ref 0–1.2)
BUN SERPL-MCNC: 22 MG/DL (ref 8–23)
CALCIUM SERPL-MCNC: 9.8 MG/DL (ref 8.8–10.2)
CHLORIDE SERPL-SCNC: 102 MMOL/L (ref 98–107)
CO2 SERPL-SCNC: 27 MMOL/L (ref 20–29)
CREAT SERPL-MCNC: 1.55 MG/DL (ref 0.8–1.3)
GLOBULIN SER CALC-MCNC: 2.9 G/DL (ref 2.3–3.5)
GLUCOSE SERPL-MCNC: 133 MG/DL (ref 70–99)
POTASSIUM SERPL-SCNC: 4.2 MMOL/L (ref 3.5–5.1)
PROT SERPL-MCNC: 7.1 G/DL (ref 6.3–8.2)
SODIUM SERPL-SCNC: 139 MMOL/L (ref 136–145)

## 2025-08-26 PROCEDURE — 36415 COLL VENOUS BLD VENIPUNCTURE: CPT | Performed by: NURSE PRACTITIONER

## 2025-09-02 ENCOUNTER — OFFICE VISIT (OUTPATIENT)
Dept: FAMILY MEDICINE CLINIC | Facility: CLINIC | Age: 60
End: 2025-09-02
Payer: COMMERCIAL

## 2025-09-02 VITALS
OXYGEN SATURATION: 94 % | WEIGHT: 193.6 LBS | SYSTOLIC BLOOD PRESSURE: 122 MMHG | DIASTOLIC BLOOD PRESSURE: 78 MMHG | BODY MASS INDEX: 27.72 KG/M2 | HEIGHT: 70 IN | HEART RATE: 61 BPM

## 2025-09-02 DIAGNOSIS — E11.40 CONTROLLED TYPE 2 DIABETES MELLITUS WITH NEUROPATHY (HCC): ICD-10-CM

## 2025-09-02 DIAGNOSIS — B35.2 TINEA MANUUM: Primary | ICD-10-CM

## 2025-09-02 PROCEDURE — 3044F HG A1C LEVEL LT 7.0%: CPT | Performed by: NURSE PRACTITIONER

## 2025-09-02 PROCEDURE — 99214 OFFICE O/P EST MOD 30 MIN: CPT | Performed by: NURSE PRACTITIONER

## 2025-09-02 PROCEDURE — 3078F DIAST BP <80 MM HG: CPT | Performed by: NURSE PRACTITIONER

## 2025-09-02 PROCEDURE — 3074F SYST BP LT 130 MM HG: CPT | Performed by: NURSE PRACTITIONER

## 2025-09-02 RX ORDER — TERBINAFINE HYDROCHLORIDE 250 MG/1
250 TABLET ORAL DAILY
Qty: 90 TABLET | Refills: 0 | Status: SHIPPED | OUTPATIENT
Start: 2025-09-02

## 2025-09-02 ASSESSMENT — ENCOUNTER SYMPTOMS
APNEA: 0
FACIAL SWELLING: 0
RECTAL PAIN: 0
STRIDOR: 0
PHOTOPHOBIA: 0
GASTROINTESTINAL NEGATIVE: 1
BACK PAIN: 0
VOICE CHANGE: 0
EYES NEGATIVE: 1
WHEEZING: 0
ANAL BLEEDING: 0
SHORTNESS OF BREATH: 0
CONSTIPATION: 0
CHOKING: 0
EYE REDNESS: 0
EYE DISCHARGE: 0
TROUBLE SWALLOWING: 0
ABDOMINAL DISTENTION: 0
COUGH: 0
EYE ITCHING: 0
SINUS PAIN: 1
DIARRHEA: 0
SINUS PRESSURE: 1
RESPIRATORY NEGATIVE: 1
CHEST TIGHTNESS: 0
VOMITING: 0
RHINORRHEA: 0
BLOOD IN STOOL: 0
COLOR CHANGE: 0
NAUSEA: 0
ABDOMINAL PAIN: 0
ALLERGIC/IMMUNOLOGIC NEGATIVE: 1
SORE THROAT: 0

## (undated) DEVICE — GAUZE,SPONGE,4"X4",12PLY,WOVEN,NS,LF: Brand: MEDLINE

## (undated) DEVICE — FORCEPS BX L240CM JAW DIA2.4MM ORNG L CAP W/ NDL DISP RAD

## (undated) DEVICE — Y-TYPE TUR/BLADDER IRRIGATION SET, REGULATING CLAMP

## (undated) DEVICE — CANNULA NSL ORAL AD FOR CAPNOFLEX CO2 O2 AIRLFE

## (undated) DEVICE — GUIDEWIRE ENDOSCP L150CM DIA0.038IN TIP L3CM PTFE FLX STR

## (undated) DEVICE — SUTURE ETHLN SZ 2-0 L18IN NONABSORBABLE BLK L26MM PS 3/8 585H

## (undated) DEVICE — BUTTON SWITCH PENCIL BLADE ELECTRODE, HOLSTER: Brand: EDGE

## (undated) DEVICE — CONTAINER FORMALIN PREFILLED 10% NBF 60ML

## (undated) DEVICE — CONNECTOR TBNG OD5-7MM O2 END DISP

## (undated) DEVICE — DISPOSABLE BIOPSY VALVE MAJ-1555: Brand: SINGLE USE BIOPSY VALVE (STERILE)

## (undated) DEVICE — SOLUTION IRRIG 1000ML H2O PIC PLAS SHATTERPROOF CONTAINER

## (undated) DEVICE — (D)PREP SKN CHLRAPRP APPL 26ML -- CONVERT TO ITEM 371833

## (undated) DEVICE — SINGLE PORT MANIFOLD: Brand: NEPTUNE 2

## (undated) DEVICE — GARMENT,MEDLINE,DVT,INT,CALF,MED, GEN2: Brand: MEDLINE

## (undated) DEVICE — BAG DRAIN UROLOGY GENESIS NS

## (undated) DEVICE — PAIRED WIRE HELICAL STONE RETRIEVAL BASKET: Brand: GEMINI

## (undated) DEVICE — MOUTHPIECE ENDOSCP L CTRL OPN AND SIDE PORTS DISP

## (undated) DEVICE — GLOVE SURG SZ 8 CRM LTX FREE POLYISOPRENE POLYMER BEAD ANTI

## (undated) DEVICE — FCPS BX HOT RJ4 2.2MMX240CM -- RADIAL JAW 4 BX/40

## (undated) DEVICE — KENDALL RADIOLUCENT FOAM MONITORING ELECTRODE RECTANGULAR SHAPE: Brand: KENDALL

## (undated) DEVICE — CONTAINER PREFIL FRMLN 40ML --

## (undated) DEVICE — SLING ARM AD ULT

## (undated) DEVICE — INFLATION DEVICE: Brand: ENCORE™ 26

## (undated) DEVICE — DRAPE XR C ARM 41X74IN LF --

## (undated) DEVICE — AIRLIFE™ OXYGEN TUBING 7 FEET (2.1 M) CRUSH RESISTANT OXYGEN TUBING, VINYL TIPPED: Brand: AIRLIFE™

## (undated) DEVICE — RETRIEVER SUT ARTHSCP HOFFEE --

## (undated) DEVICE — CATHETER URETH BLLN 5CC 20FR F 2 W SPEC COUNCL MOD SHT OPN

## (undated) DEVICE — MINOR SPLIT GENERAL: Brand: MEDLINE INDUSTRIES, INC.

## (undated) DEVICE — SYRINGE MED 10ML LUERLOCK TIP W/O SFTY DISP

## (undated) DEVICE — PROBE LITHO 12FR L3.76MM DISP SHOCKPULSE

## (undated) DEVICE — DRAPE,HAND,STERILE: Brand: MEDLINE

## (undated) DEVICE — ZIMMER® STERILE DISPOSABLE TOURNIQUET CUFF WITH PLC, DUAL PORT, SINGLE BLADDER, 18 IN. (46 CM)

## (undated) DEVICE — BNDG,ELSTC,MATRIX,STRL,3"X5YD,LF,HOOK&LP: Brand: MEDLINE

## (undated) DEVICE — APPLICATOR MEDICATED 26 CC SOLUTION HI LT ORNG CHLORAPREP

## (undated) DEVICE — 1.6 MM DRILL AND GUIDE FOR                                    DYNOMITE 2.0 MM SUTURE ANCHOR: Brand: DYNOMITE

## (undated) DEVICE — Device

## (undated) DEVICE — SHEET,DRAPE,53X77,STERILE: Brand: MEDLINE

## (undated) DEVICE — C-ARM: Brand: UNBRANDED

## (undated) DEVICE — AMD ANTIMICROBIAL GAUZE SPONGES,12 PLY USP TYPE VII, 0.2% POLYHEXAMETHYLENE BIGUANIDE HCI (PHMB): Brand: CURITY

## (undated) DEVICE — NDL PRT INJ NSAF BLNT 18GX1.5 --

## (undated) DEVICE — LUBE JELLY FOIL PACK 1.4 OZ: Brand: MEDLINE INDUSTRIES, INC.

## (undated) DEVICE — SOLUTION IRRIG 3000ML 0.9% SOD CHL USP UROMATIC PLAS CONT

## (undated) DEVICE — BLOCK BITE AD 60FR W/ VELC STRP ADDRESSES MOST PT AND

## (undated) DEVICE — GUIDEWIRE VASC L180CM DIA0.035IN TIP L7CM PTFE S STL STR

## (undated) DEVICE — GUIDEWIRE URO L145CM DIA0.035IN TIP L7CM S STL PTFE BENT

## (undated) DEVICE — SURGICAL PROCEDURE PACK BASIC ST FRANCIS

## (undated) DEVICE — BAG,DRAINAGE,4L,A/R TOWER,LL,SLIDE TAP: Brand: MEDLINE

## (undated) DEVICE — SYRINGE CATH TIP 50ML

## (undated) DEVICE — SYR 3ML LL TIP 1/10ML GRAD --

## (undated) DEVICE — SUTURE PDS II SZ 2-0 L27IN ABSRB VLT L26MM CT-2 1/2 CIR Z333H

## (undated) DEVICE — SUTURE VCRL RAPIDE SZ 4-0 L18IN ABSRB UD PS-3 L13MM 3/8 CIR VR494

## (undated) DEVICE — SYRINGE MEDICAL 3ML CLEAR PLASTIC STANDARD NON CONTROL LUERLOCK TIP DISPOSABLE

## (undated) DEVICE — NEEDLE SYRINGE 18GA L1.5IN RED PLAS HUB S STL BLNT FILL W/O

## (undated) DEVICE — TOBRA BONE BASKET- AUTOGRAFT BONE COLLECTION SYSTEM WITH MESH FILTER AND GRAFT COMPRESSOR: Brand: TOBRA BONE BASKET

## (undated) DEVICE — SINGLE-USE DIGITAL FLEXIBLE URETEROSCOPE: Brand: LITHOVUE

## (undated) DEVICE — HIGH PRESSURE NEPHROSTOMY BALLOON CATHETER: Brand: NEPHROMAX

## (undated) DEVICE — REM POLYHESIVE ADULT PATIENT RETURN ELECTRODE: Brand: VALLEYLAB

## (undated) DEVICE — APPLICATOR BNDG 1MM ADH PREMIERPRO EXOFIN

## (undated) DEVICE — DRAPE PT ISOLATN 130 IN X 96 IN

## (undated) DEVICE — DILATOR/SHEATH SET: Brand: 8/10 DILATOR/SHEATH SET

## (undated) DEVICE — FIBER LASER 200UM 2J 80HZ 60W D F L FOR LITHO MOSES

## (undated) DEVICE — GUIDEWIRE UROLOGICAL STR 3 CM 0.038 IN X150 CM FIX STIFF LF

## (undated) DEVICE — ENDOSCOPIC KIT 1.1+ OP4 CA DE 2 GWN AAMI LEVEL 3

## (undated) DEVICE — TRAY,URINE METER,100% SILICONE,16FR10ML: Brand: MEDLINE

## (undated) DEVICE — URETERAL ACCESS SHEATH SET: Brand: NAVIGATOR HD

## (undated) DEVICE — SOLUTION IV 1000ML 0.9% SOD CHL

## (undated) DEVICE — GUIDEWIRE WITH SPRING TIP - SINGLE USE: Brand: SAFEGUIDE®

## (undated) DEVICE — GOWN,REINFORCED,POLY,AURORA,XXLARGE,STR: Brand: MEDLINE

## (undated) DEVICE — SYR 5ML 1/5 GRAD LL NSAF LF --

## (undated) DEVICE — SOLUTION IRRIG 3000ML H2O STRL BAG

## (undated) DEVICE — STRATAFIX SUTURE